# Patient Record
Sex: MALE | Race: WHITE | NOT HISPANIC OR LATINO | Employment: OTHER | URBAN - METROPOLITAN AREA
[De-identification: names, ages, dates, MRNs, and addresses within clinical notes are randomized per-mention and may not be internally consistent; named-entity substitution may affect disease eponyms.]

---

## 2017-03-29 ENCOUNTER — HOSPITAL ENCOUNTER (EMERGENCY)
Facility: HOSPITAL | Age: 57
Discharge: HOME | End: 2017-03-30
Attending: EMERGENCY MEDICINE | Admitting: EMERGENCY MEDICINE
Payer: COMMERCIAL

## 2017-03-29 VITALS
HEART RATE: 73 BPM | BODY MASS INDEX: 37.67 KG/M2 | HEIGHT: 67 IN | TEMPERATURE: 98 F | SYSTOLIC BLOOD PRESSURE: 136 MMHG | DIASTOLIC BLOOD PRESSURE: 76 MMHG | WEIGHT: 240 LBS | RESPIRATION RATE: 20 BRPM | OXYGEN SATURATION: 98 %

## 2017-03-29 DIAGNOSIS — F41.9 ANXIETY: ICD-10-CM

## 2017-03-29 DIAGNOSIS — R00.2 PALPITATIONS: Primary | ICD-10-CM

## 2017-03-29 LAB
BASOPHILS # BLD AUTO: 0 THOUSANDS/ΜL (ref 0–0.1)
BASOPHILS NFR BLD AUTO: 0 % (ref 0–1)
EOSINOPHIL # BLD AUTO: 0.2 THOUSAND/ΜL (ref 0–0.61)
EOSINOPHIL NFR BLD AUTO: 1 % (ref 0–6)
ERYTHROCYTE [DISTWIDTH] IN BLOOD BY AUTOMATED COUNT: 12.6 % (ref 11.6–15.1)
HCT VFR BLD AUTO: 46.9 % (ref 42–52)
HGB BLD-MCNC: 15.7 G/DL (ref 14–18)
LYMPHOCYTES # BLD AUTO: 3.1 THOUSANDS/ΜL (ref 0.6–4.47)
LYMPHOCYTES NFR BLD AUTO: 26 % (ref 14–44)
MCH RBC QN AUTO: 30.1 PG (ref 27–31)
MCHC RBC AUTO-ENTMCNC: 33.5 G/DL (ref 31.4–37.4)
MCV RBC AUTO: 90 FL (ref 82–98)
MONOCYTES # BLD AUTO: 1 THOUSAND/ΜL (ref 0.17–1.22)
MONOCYTES NFR BLD AUTO: 8 % (ref 4–12)
NEUTROPHILS # BLD AUTO: 7.6 THOUSANDS/ΜL (ref 1.85–7.62)
NEUTS SEG NFR BLD AUTO: 64 % (ref 43–75)
NRBC BLD AUTO-RTO: 0 /100 WBCS
PLATELET # BLD AUTO: 254 THOUSANDS/UL (ref 130–400)
PMV BLD AUTO: 9 FL (ref 8.9–12.7)
RBC # BLD AUTO: 5.22 MILLION/UL (ref 4.7–6.1)
WBC # BLD AUTO: 11.8 THOUSAND/UL (ref 4.8–10.8)

## 2017-03-29 PROCEDURE — 93005 ELECTROCARDIOGRAM TRACING: CPT | Performed by: EMERGENCY MEDICINE

## 2017-03-29 PROCEDURE — 80053 COMPREHEN METABOLIC PANEL: CPT | Performed by: EMERGENCY MEDICINE

## 2017-03-29 PROCEDURE — 85379 FIBRIN DEGRADATION QUANT: CPT | Performed by: EMERGENCY MEDICINE

## 2017-03-29 PROCEDURE — 36415 COLL VENOUS BLD VENIPUNCTURE: CPT | Performed by: EMERGENCY MEDICINE

## 2017-03-29 PROCEDURE — 85025 COMPLETE CBC W/AUTO DIFF WBC: CPT | Performed by: EMERGENCY MEDICINE

## 2017-03-29 RX ORDER — ROSUVASTATIN CALCIUM 10 MG/1
10 TABLET, COATED ORAL DAILY
COMMUNITY
End: 2019-09-23 | Stop reason: ALTCHOICE

## 2017-03-30 LAB
ALBUMIN SERPL BCP-MCNC: 3.8 G/DL (ref 3.5–5)
ALP SERPL-CCNC: 54 U/L (ref 46–116)
ALT SERPL W P-5'-P-CCNC: 52 U/L (ref 12–78)
ANION GAP SERPL CALCULATED.3IONS-SCNC: 9 MMOL/L (ref 4–13)
AST SERPL W P-5'-P-CCNC: 24 U/L (ref 5–45)
ATRIAL RATE: 72 BPM
BILIRUB SERPL-MCNC: 0.4 MG/DL (ref 0.2–1)
BUN SERPL-MCNC: 20 MG/DL (ref 5–25)
CALCIUM SERPL-MCNC: 9.2 MG/DL (ref 8.3–10.1)
CHLORIDE SERPL-SCNC: 102 MMOL/L (ref 100–108)
CO2 SERPL-SCNC: 29 MMOL/L (ref 21–32)
CREAT SERPL-MCNC: 1.26 MG/DL (ref 0.6–1.3)
DEPRECATED D DIMER PPP: <190 NG/ML (FEU) (ref 190–520)
GFR SERPL CREATININE-BSD FRML MDRD: 58.8 ML/MIN/1.73SQ M
GLUCOSE SERPL-MCNC: 195 MG/DL (ref 65–140)
P AXIS: 56 DEGREES
POTASSIUM SERPL-SCNC: 3.9 MMOL/L (ref 3.5–5.3)
PR INTERVAL: 166 MS
PROT SERPL-MCNC: 7.3 G/DL (ref 6.4–8.2)
QRS AXIS: 76 DEGREES
QRSD INTERVAL: 98 MS
QT INTERVAL: 400 MS
QTC INTERVAL: 438 MS
SODIUM SERPL-SCNC: 140 MMOL/L (ref 136–145)
T WAVE AXIS: 42 DEGREES
TROPONIN I SERPL-MCNC: <0.02 NG/ML
VENTRICULAR RATE: 72 BPM

## 2017-03-30 PROCEDURE — 84484 ASSAY OF TROPONIN QUANT: CPT | Performed by: EMERGENCY MEDICINE

## 2017-03-30 PROCEDURE — 99285 EMERGENCY DEPT VISIT HI MDM: CPT

## 2018-05-22 ENCOUNTER — OFFICE VISIT (OUTPATIENT)
Dept: GASTROENTEROLOGY | Facility: CLINIC | Age: 58
End: 2018-05-22
Payer: COMMERCIAL

## 2018-05-22 VITALS
DIASTOLIC BLOOD PRESSURE: 78 MMHG | WEIGHT: 238 LBS | TEMPERATURE: 97.2 F | BODY MASS INDEX: 37.35 KG/M2 | SYSTOLIC BLOOD PRESSURE: 120 MMHG | HEART RATE: 55 BPM | HEIGHT: 67 IN

## 2018-05-22 DIAGNOSIS — Z86.010 HISTORY OF COLON POLYPS: Primary | ICD-10-CM

## 2018-05-22 PROBLEM — Z86.0100 HISTORY OF COLON POLYPS: Status: ACTIVE | Noted: 2018-05-22

## 2018-05-22 PROCEDURE — 99244 OFF/OP CNSLTJ NEW/EST MOD 40: CPT | Performed by: INTERNAL MEDICINE

## 2018-05-22 RX ORDER — ROSUVASTATIN CALCIUM 20 MG/1
TABLET, COATED ORAL
COMMUNITY
Start: 2018-03-20 | End: 2022-02-02

## 2018-05-22 RX ORDER — PRASUGREL 10 MG/1
TABLET, FILM COATED ORAL
COMMUNITY
Start: 2018-04-05 | End: 2019-08-23

## 2018-05-22 RX ORDER — METFORMIN HYDROCHLORIDE 500 MG/1
TABLET, EXTENDED RELEASE ORAL
COMMUNITY
Start: 2018-03-30 | End: 2020-08-24

## 2018-05-22 RX ORDER — BLOOD-GLUCOSE METER
KIT MISCELLANEOUS
Refills: 0 | COMMUNITY
Start: 2018-05-10

## 2018-05-22 RX ORDER — FENOFIBRIC ACID 135 MG/1
CAPSULE, DELAYED RELEASE ORAL
COMMUNITY
Start: 2018-04-13 | End: 2019-09-23 | Stop reason: SDUPTHER

## 2018-05-22 RX ORDER — METOPROLOL SUCCINATE 25 MG
TABLET, EXTENDED RELEASE 24 HR ORAL
COMMUNITY
Start: 2018-03-20 | End: 2019-08-23

## 2018-05-22 RX ORDER — ASPIRIN 81 MG/1
81 TABLET, CHEWABLE ORAL
COMMUNITY
Start: 2018-03-20

## 2018-05-22 RX ORDER — LOSARTAN POTASSIUM 25 MG/1
25 TABLET ORAL
COMMUNITY

## 2018-05-22 NOTE — LETTER
May 22, 2018     [address] [place]    Patient: Harish Corbin   YOB: 1960   Date of Visit: 5/22/2018       Dear Dr Deshawn Haro:    Thank you for referring Harish Corbin to me for evaluation  Below are my notes for this consultation  If you have questions, please do not hesitate to call me  I look forward to following your patient along with you           Sincerely,        Avelino Chen MD        CC: No Recipients

## 2018-05-22 NOTE — PROGRESS NOTES
Consultation - [facility]  Miguelina Moralez 1960 male         Chief Complaint:  History of colon polyps    HPI:  49-year-old male with history of coronary artery disease status post stent placement in May 2017 and October 2017 was referred for colonoscopy  Patient has history of colon polyps and his last colonoscopy was about 8 years ago  Patient has regular bowel movements and denies any blood or mucus in the stool  Appetite is good and denies any recent weight loss  Denies any abdominal pain, nausea, or vomiting  Has no heartburn or acid reflux  Denies any difficulty swallowing  REVIEW OF SYSTEMS: Review of Systems   Constitutional: Negative for activity change, appetite change, chills, diaphoresis, fatigue, fever and unexpected weight change  HENT: Negative for ear discharge, ear pain, facial swelling, hearing loss, nosebleeds, sore throat, tinnitus and voice change  Eyes: Negative for pain, discharge, redness, itching and visual disturbance  Respiratory: Negative for apnea, cough, chest tightness, shortness of breath and wheezing  Cardiovascular: Negative for chest pain and palpitations  Gastrointestinal:        As noted in HPI   Endocrine: Negative for cold intolerance, heat intolerance and polyuria  Genitourinary: Negative for difficulty urinating, dysuria, flank pain, hematuria and urgency  Musculoskeletal: Negative for arthralgias, back pain, gait problem, joint swelling and myalgias  Skin: Negative for rash and wound  Neurological: Negative for dizziness, tremors, seizures, speech difficulty, light-headedness, numbness and headaches  Hematological: Negative for adenopathy  Does not bruise/bleed easily  Psychiatric/Behavioral: Negative for agitation, behavioral problems and confusion  The patient is not nervous/anxious           Past Medical History:   Diagnosis Date    Colon polyp     Diabetes mellitus (HCC)     Hyperlipidemia       Past Surgical History:   Procedure Laterality Date    COLONOSCOPY       Social History     Social History    Marital status: Single     Spouse name: N/A    Number of children: N/A    Years of education: N/A     Occupational History    Not on file  Social History Main Topics    Smoking status: Current Every Day Smoker     Types: Cigars    Smokeless tobacco: Never Used      Comment: 3 a day    Alcohol use Yes      Comment: 1-2 beers once a week    Drug use: No    Sexual activity: Not on file     Other Topics Concern    Not on file     Social History Narrative    No narrative on file     Family History   Problem Relation Age of Onset    Colon cancer Maternal Grandmother     Breast cancer Mother     Other Mother      Chocolate  Current Outpatient Prescriptions   Medication Sig Dispense Refill    aspirin 81 mg chewable tablet       Blood Glucose Monitoring Suppl (ONE TOUCH ULTRA MINI) w/Device KIT   0    Chlorphen-Pyril-Phenyleph (TRIPLEX AD PO) Take 135 mg by mouth daily      Choline Fenofibrate (FENOFIBRIC ACID) 135 MG CPDR       choline fenofibrate (TRILIPIX) 45 MG capsule Take by mouth      losartan (COZAAR) 25 mg tablet Take 25 mg by mouth daily      metFORMIN (GLUCOPHAGE-XR) 500 mg 24 hr tablet       ONE TOUCH ULTRA TEST test strip TEST twice a day  0    ONETOUCH DELICA LANCETS FINE MISC TEST twice a day  0    prasugrel (EFFIENT) tablet       rosuvastatin (CRESTOR) 10 MG tablet Take 10 mg by mouth daily      TOPROL XL 25 MG 24 hr tablet       METFORMIN HCL PO Take by mouth daily      Na Sulfate-K Sulfate-Mg Sulf (SUPREP BOWEL PREP KIT) 17 5-3 13-1 6 GM/180ML SOLN Take 2 Bottles by mouth see administration instructions Please follow the instructions from the office 2 Bottle 0    rosuvastatin (CRESTOR) 20 MG tablet        No current facility-administered medications for this visit          Blood pressure 120/78, pulse 55, temperature (!) 97 2 °F (36 2 °C), temperature source Tympanic, height 5' 7" (1 702 m), weight 108 kg (238 lb)  PHYSICAL EXAM: Physical Exam   Constitutional: He is oriented to person, place, and time  He appears well-developed  HENT:   Head: Normocephalic and atraumatic  Mouth/Throat: Oropharynx is clear and moist    Eyes: Conjunctivae are normal  Pupils are equal, round, and reactive to light  Right eye exhibits no discharge  Left eye exhibits no discharge  No scleral icterus  Neck: Neck supple  No JVD present  No tracheal deviation present  No thyromegaly present  Cardiovascular: Normal rate, regular rhythm, normal heart sounds and intact distal pulses  Exam reveals no gallop and no friction rub  No murmur heard  Pulmonary/Chest: Effort normal and breath sounds normal  No respiratory distress  He has no wheezes  He has no rales  He exhibits no tenderness  Abdominal: Soft  Bowel sounds are normal  He exhibits no distension and no mass  There is no tenderness  There is no rebound and no guarding  No hernia  Musculoskeletal: He exhibits no edema  Lymphadenopathy:     He has no cervical adenopathy  Neurological: He is alert and oriented to person, place, and time  Skin: Skin is warm and dry  No rash noted  No erythema  Psychiatric: He has a normal mood and affect  His behavior is normal  Thought content normal         Lab Results   Component Value Date    WBC 11 80 (H) 03/29/2017    HGB 15 7 03/29/2017    HCT 46 9 03/29/2017    MCV 90 03/29/2017     03/29/2017     Lab Results   Component Value Date    GLUCOSE 195 (H) 03/29/2017    CALCIUM 9 2 03/29/2017     03/29/2017    K 3 9 03/29/2017    CO2 29 03/29/2017     03/29/2017    BUN 20 03/29/2017    CREATININE 1 26 03/29/2017     Lab Results   Component Value Date    ALT 52 03/29/2017    AST 24 03/29/2017    ALKPHOS 54 03/29/2017    BILITOT 0 40 03/29/2017     No results found for: INR, PROTIME    No results found      ASSESSMENT & PLAN:    History of colon polyps  Personal history of colon polyps- patient is at increased risk for colon cancer screening  Rule out colorectal lesions including polyps or malignancy     -Schedule for colonoscopy  Patient is at increased risks because of recent stent placement and antiplatelet therapy  Prefer to wait at least for a year since his last stent placement  Patient has an appointment with his cardiologist this week after which he will give was a call about the timing of colonoscopy  Discussed with him about the diagnostic colonoscopy if needed  -High-fiber diet     -Patient was given instructions about the colonoscopy prep     -Patient was explained about  the risks and benefits of the procedure  Risks including but not limited to bleeding, infection, perforation were explained in detail  Also explained about less than 100% sensitivity with the exam and other alternatives

## 2018-05-24 ENCOUNTER — TELEPHONE (OUTPATIENT)
Dept: GASTROENTEROLOGY | Facility: CLINIC | Age: 58
End: 2018-05-24

## 2018-05-24 ENCOUNTER — TELEPHONE (OUTPATIENT)
Dept: GASTROENTEROLOGY | Facility: AMBULARY SURGERY CENTER | Age: 58
End: 2018-05-24

## 2018-05-24 NOTE — TELEPHONE ENCOUNTER
Left patient voicemail to come into office to fill out medical records consent form for his release of records

## 2018-05-24 NOTE — TELEPHONE ENCOUNTER
DR Cherry Davies PT    Pt called requesting for the office to request his records from the [facility] [place] [phone] (p), [phone] (f)

## 2019-04-29 LAB — HBA1C MFR BLD HPLC: 7.3 %

## 2019-08-23 ENCOUNTER — TRANSCRIBE ORDERS (OUTPATIENT)
Dept: ADMINISTRATIVE | Facility: HOSPITAL | Age: 59
End: 2019-08-23

## 2019-08-23 ENCOUNTER — CONSULT (OUTPATIENT)
Dept: PULMONOLOGY | Facility: MEDICAL CENTER | Age: 59
End: 2019-08-23
Payer: COMMERCIAL

## 2019-08-23 ENCOUNTER — HOSPITAL ENCOUNTER (OUTPATIENT)
Dept: RADIOLOGY | Facility: HOSPITAL | Age: 59
Discharge: HOME | End: 2019-08-23
Attending: INTERNAL MEDICINE
Payer: COMMERCIAL

## 2019-08-23 VITALS
SYSTOLIC BLOOD PRESSURE: 126 MMHG | OXYGEN SATURATION: 96 % | TEMPERATURE: 95 F | HEART RATE: 49 BPM | RESPIRATION RATE: 12 BRPM | DIASTOLIC BLOOD PRESSURE: 82 MMHG | HEIGHT: 69 IN | WEIGHT: 249 LBS | BODY MASS INDEX: 36.88 KG/M2

## 2019-08-23 DIAGNOSIS — R06.02 SOBOE (SHORTNESS OF BREATH ON EXERTION): ICD-10-CM

## 2019-08-23 DIAGNOSIS — R06.02 SOBOE (SHORTNESS OF BREATH ON EXERTION): Primary | ICD-10-CM

## 2019-08-23 PROBLEM — J45.20 MILD INTERMITTENT ASTHMA WITHOUT COMPLICATION: Status: ACTIVE | Noted: 2019-08-23

## 2019-08-23 PROBLEM — G47.33 OSA (OBSTRUCTIVE SLEEP APNEA): Status: ACTIVE | Noted: 2019-08-23

## 2019-08-23 PROCEDURE — 99214 OFFICE O/P EST MOD 30 MIN: CPT | Performed by: INTERNAL MEDICINE

## 2019-08-23 PROCEDURE — 71046 X-RAY EXAM CHEST 2 VIEWS: CPT

## 2019-08-23 PROCEDURE — 94010 BREATHING CAPACITY TEST: CPT | Performed by: INTERNAL MEDICINE

## 2019-08-23 RX ORDER — AMOXICILLIN 875 MG/1
TABLET, COATED ORAL EVERY 12 HOURS
Refills: 0 | COMMUNITY
Start: 2019-07-18 | End: 2019-09-23 | Stop reason: ALTCHOICE

## 2019-08-23 NOTE — PROGRESS NOTES
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  I will trial him on 2 weeks of Breo and asess response to treatment  I see no evidence of COPD on office spirometry but it does suggest restrictive lung disease  I have ordered CXR and full PFTs for further evaluation  MOUNIKA (obstructive sleep apnea)  I am highly suspicous for sleep apnea and he is agreeable to sleep study  Diagnoses and all orders for this visit:    SOBOE (shortness of breath on exertion)  -     POCT spirometry  -     Complete pulmonary function test; Future  -     XR chest pa & lateral; Future  -     Diagnostic Sleep Study; Future    Other orders  -     amoxicillin (AMOXIL) 875 mg tablet; Take by mouth every 12 (twelve) hours  -     CIPRODEX otic suspension; instill 4 drops into affected ear twice a day for 7 days          Subjective:      Patient ID: Lindsey Dietrich is a 59 y o  male  Mr Jojo Samaniego is here for evaluation of his shortness of breath  He noticed this over the past 18 months with dyspnea on exertion and with stairs and hot weather  He does have occasional wheeze and coughs mostly with laughing and in AM   He smoked cigars since 1992 but quit 3 weeks ago about 2 a day  He denies frequent bronchitis or pneumonia  He sleeps 7 hours a night and has snoring and significant daytime sleepiness  He worked his life as a  is now retired  Wheezing    Associated symptoms include coughing  Pertinent negatives include no shortness of breath  Shortness of Breath   Associated symptoms include wheezing  Cough   Associated symptoms include wheezing  Pertinent negatives include no shortness of breath         The following portions of the patient's history were reviewed and updated as appropriate: allergies, current medications, past family history, past medical history, past social history, past surgical history and problem list     Review of Systems   Constitutional: Negative  HENT: Negative  Eyes: Negative  Respiratory: Positive for cough and wheezing  Negative for shortness of breath  Cardiovascular: Negative  Gastrointestinal: Negative  Endocrine: Negative  Genitourinary: Negative  Allergic/Immunologic: Negative  Neurological: Negative  Hematological: Negative  Psychiatric/Behavioral: Negative  Objective:      /82 (BP Location: Left arm, Patient Position: Sitting, Cuff Size: Standard)   Pulse (!) 49   Temp (!) 95 °F (35 °C) (Tympanic)   Resp 12   Ht 5' 8 5" (1 74 m)   Wt 113 kg (249 lb)   SpO2 96%   BMI 37 31 kg/m²          Physical Exam   Constitutional: He is oriented to person, place, and time  He appears well-developed and well-nourished  HENT:   Head: Normocephalic  Eyes: Pupils are equal, round, and reactive to light  Neck: Neck supple  Cardiovascular: Normal rate  Pulmonary/Chest: Effort normal  No respiratory distress  He has no wheezes  He has no rales  Abdominal: Soft  Musculoskeletal: Normal range of motion  He exhibits no edema  Neurological: He is alert and oriented to person, place, and time  Skin: Skin is warm and dry

## 2019-08-23 NOTE — ASSESSMENT & PLAN NOTE
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

## 2019-08-27 DIAGNOSIS — R91.1 LUNG NODULE SEEN ON IMAGING STUDY: Primary | ICD-10-CM

## 2019-08-27 NOTE — PROGRESS NOTES
I had left message to patient regarding chest x-ray findings  Done on 08/23/2019 no acute cardiopulmonary disease was noted  There was a nodular density the right lung base possibly representing the right nipple shadow  It was suggested the patient have CT of chest with nipple marker to exclude pulmonary nodule  I ordered CT of chest and also left office phone number patient having questions

## 2019-08-29 ENCOUNTER — TELEPHONE (OUTPATIENT)
Dept: PULMONOLOGY | Facility: MEDICAL CENTER | Age: 59
End: 2019-08-29

## 2019-08-29 NOTE — TELEPHONE ENCOUNTER
Patient called and would like to speak with you regarding two tests he has to have done  He needs a CT scan and complete pulmonary function test and would like to know what it all involves

## 2019-08-30 ENCOUNTER — TELEPHONE (OUTPATIENT)
Dept: PULMONOLOGY | Facility: MEDICAL CENTER | Age: 59
End: 2019-08-30

## 2019-08-30 NOTE — TELEPHONE ENCOUNTER
Calling to speak with you regarding lung x-ray Dr Bud Ny spoke with him about  States the x-ray had a shadow on it and wants to know if it could be from the heart monitor installed under his skin  Please call and advise

## 2019-09-03 NOTE — TELEPHONE ENCOUNTER
Returned call  All questions answered regarding x-ray, CT scan, pulmonary function tests    Pt thankful for follow up

## 2019-09-04 ENCOUNTER — HOSPITAL ENCOUNTER (OUTPATIENT)
Dept: PULMONOLOGY | Facility: HOSPITAL | Age: 59
Discharge: HOME | End: 2019-09-04
Attending: INTERNAL MEDICINE
Payer: COMMERCIAL

## 2019-09-04 DIAGNOSIS — R06.02 SOBOE (SHORTNESS OF BREATH ON EXERTION): ICD-10-CM

## 2019-09-04 PROCEDURE — 94726 PLETHYSMOGRAPHY LUNG VOLUMES: CPT | Performed by: INTERNAL MEDICINE

## 2019-09-04 PROCEDURE — 94760 N-INVAS EAR/PLS OXIMETRY 1: CPT

## 2019-09-04 PROCEDURE — 94060 EVALUATION OF WHEEZING: CPT | Performed by: INTERNAL MEDICINE

## 2019-09-04 PROCEDURE — 94729 DIFFUSING CAPACITY: CPT | Performed by: INTERNAL MEDICINE

## 2019-09-04 PROCEDURE — 94726 PLETHYSMOGRAPHY LUNG VOLUMES: CPT

## 2019-09-04 PROCEDURE — 94729 DIFFUSING CAPACITY: CPT

## 2019-09-04 PROCEDURE — 94060 EVALUATION OF WHEEZING: CPT

## 2019-09-04 RX ORDER — ALBUTEROL SULFATE 2.5 MG/3ML
2.5 SOLUTION RESPIRATORY (INHALATION) ONCE
Status: DISCONTINUED | OUTPATIENT
Start: 2019-09-04 | End: 2019-09-08 | Stop reason: HOSPADM

## 2019-09-09 ENCOUNTER — TELEPHONE (OUTPATIENT)
Dept: PULMONOLOGY | Facility: MEDICAL CENTER | Age: 59
End: 2019-09-09

## 2019-09-10 NOTE — PROGRESS NOTES
LEIDA has throat soreness  He has been using Breo 100 mcg 1 puff daily  He has less wheezing and feels somewhat better  I told him that he can use Breo inhaler every other day or stop completely  I reviewed PFTs briefly  Does not appear that he has any obstructive defect  Diffusion capacity is normal   Patient has not yet gotten his CT of chest   He has a follow-up with Dr Erick Herman on September 19, 2019

## 2019-09-10 NOTE — TELEPHONE ENCOUNTER
Patient called back and would like to know if soreness of his throat could be a side effect of the inhaler Dr Huston Halsted put him on at his visit here  Please call patient to advise

## 2019-09-13 ENCOUNTER — TELEPHONE (OUTPATIENT)
Dept: SLEEP CENTER | Facility: CLINIC | Age: 59
End: 2019-09-13

## 2019-09-13 NOTE — TELEPHONE ENCOUNTER
----- Message from Marcellus Rosado MD sent at 9/12/2019  3:01 PM EDT -----  Approved  ----- Message -----  From: Sulaiman Campbell MA  Sent: 9/12/2019   1:34 PM EDT  To: Sleep Medicine [facility] Provider    This sleep study needs approval      If approved please sign and return to clerical pool  If denied please include reasons why  Also provide alternative testing if warranted  Please sign and return to clerical pool

## 2019-09-20 ENCOUNTER — HOSPITAL ENCOUNTER (OUTPATIENT)
Dept: RADIOLOGY | Facility: HOSPITAL | Age: 59
Discharge: HOME | End: 2019-09-20
Payer: COMMERCIAL

## 2019-09-20 DIAGNOSIS — R91.1 LUNG NODULE SEEN ON IMAGING STUDY: ICD-10-CM

## 2019-09-20 PROCEDURE — 71250 CT THORAX DX C-: CPT

## 2019-09-23 ENCOUNTER — OFFICE VISIT (OUTPATIENT)
Dept: PULMONOLOGY | Facility: CLINIC | Age: 59
End: 2019-09-23
Payer: COMMERCIAL

## 2019-09-23 VITALS
OXYGEN SATURATION: 97 % | WEIGHT: 245 LBS | TEMPERATURE: 97.8 F | BODY MASS INDEX: 36.29 KG/M2 | HEIGHT: 69 IN | HEART RATE: 54 BPM | SYSTOLIC BLOOD PRESSURE: 138 MMHG | RESPIRATION RATE: 16 BRPM | DIASTOLIC BLOOD PRESSURE: 74 MMHG

## 2019-09-23 DIAGNOSIS — J45.40 MODERATE PERSISTENT ASTHMA, UNSPECIFIED WHETHER COMPLICATED: Primary | ICD-10-CM

## 2019-09-23 DIAGNOSIS — G47.33 OSA (OBSTRUCTIVE SLEEP APNEA): ICD-10-CM

## 2019-09-23 DIAGNOSIS — J45.20 MILD INTERMITTENT ASTHMA WITHOUT COMPLICATION: ICD-10-CM

## 2019-09-23 PROCEDURE — 90656 IIV3 VACC NO PRSV 0.5 ML IM: CPT | Performed by: INTERNAL MEDICINE

## 2019-09-23 PROCEDURE — 90471 IMMUNIZATION ADMIN: CPT | Performed by: INTERNAL MEDICINE

## 2019-09-23 PROCEDURE — 99214 OFFICE O/P EST MOD 30 MIN: CPT | Performed by: INTERNAL MEDICINE

## 2019-09-23 RX ORDER — BUDESONIDE AND FORMOTEROL FUMARATE DIHYDRATE 160; 4.5 UG/1; UG/1
2 AEROSOL RESPIRATORY (INHALATION) 2 TIMES DAILY
Qty: 1 INHALER | Refills: 3 | Status: SHIPPED | OUTPATIENT
Start: 2019-09-23 | End: 2021-01-20

## 2019-09-23 NOTE — ASSESSMENT & PLAN NOTE
Mr Abebe Saha is scheduled for a sleep test coming up on October 3rd    Will follow up with him after the results of this test

## 2019-09-23 NOTE — PROGRESS NOTES
Assessment/Plan:    MOUNIKA (obstructive sleep apnea)  Mr Anatoly Gomez is scheduled for a sleep test coming up on October 3rd  Will follow up with him after the results of this test     Mild intermittent asthma without complication  I reviewed Mr Vicente's chest x-ray and recent CT scan which both appeared normal   I reviewed his most recent pulmonary function tests which were both normal   I do think he has some mild asthma related to his prolonged cigarette and occupational exposure  He seemed to respond well on Breo was having hoarseness which limited its use  I have replaced the Breo with Symbicort 2 puffs twice a day in an attempt to reduce the side effect of hoarseness that comes with the dry powder inhalers  Will follow up with him in 3-4 months  I did give him his flu shot today  Diagnoses and all orders for this visit:    Moderate persistent asthma, unspecified whether complicated  -     budesonide-formoterol (SYMBICORT) 160-4 5 mcg/act inhaler; Inhale 2 puffs 2 (two) times a day Rinse mouth after use  -     Flu vaccine greater than or equal to 4yo preservative free IM    Mild intermittent asthma without complication    MOUNIKA (obstructive sleep apnea)    Other orders  -     choline fenofibrate (TRILIPIX) 135 MG capsule; Take 135 mg by mouth daily          Subjective:      Patient ID: Candido Kaba is a 59 y o  male  [place] Lina Stewart [place] [facility]  He was no longer short of breath and denied any wheezing  Unfortunately was having hoarseness and therefore stopped taking it about 2 weeks ago  The following portions of the patient's history were reviewed and updated as appropriate: allergies, current medications, past family history, past medical history, past social history, past surgical history and problem list     Review of Systems   Constitutional: Negative  HENT: Negative  Eyes: Negative  Respiratory: Negative for shortness of breath  Cardiovascular: Negative  Gastrointestinal: Negative  Endocrine: Negative  Genitourinary: Negative  Allergic/Immunologic: Negative  Neurological: Negative  Hematological: Negative  Psychiatric/Behavioral: Negative  Objective:      /74 (BP Location: Left arm, Patient Position: Sitting, Cuff Size: Adult)   Pulse (!) 54   Temp 97 8 °F (36 6 °C) (Tympanic)   Resp 16   Ht 5' 8 5" (1 74 m)   Wt 111 kg (245 lb)   SpO2 97%   BMI 36 71 kg/m²          Physical Exam   Constitutional: He is oriented to person, place, and time  He appears well-developed and well-nourished  HENT:   Head: Normocephalic  Eyes: Pupils are equal, round, and reactive to light  Neck: Neck supple  Cardiovascular: Normal rate  Pulmonary/Chest: Effort normal  No respiratory distress  He has no wheezes  He has no rales  Abdominal: Soft  Musculoskeletal: Normal range of motion  He exhibits no edema  Neurological: He is alert and oriented to person, place, and time  Skin: Skin is warm and dry

## 2019-09-23 NOTE — ASSESSMENT & PLAN NOTE
I reviewed Mr Alexandrea Castellanos chest x-ray and recent CT scan which both appeared normal   I reviewed his most recent pulmonary function tests which were both normal   I do think he has some mild asthma related to his prolonged cigarette and occupational exposure  He seemed to respond well on Breo was having hoarseness which limited its use  I have replaced the Breo with Symbicort 2 puffs twice a day in an attempt to reduce the side effect of hoarseness that comes with the dry powder inhalers  Will follow up with him in 3-4 months  I did give him his flu shot today

## 2019-10-03 ENCOUNTER — HOSPITAL ENCOUNTER (OUTPATIENT)
Dept: SLEEP CENTER | Facility: CLINIC | Age: 59
Discharge: HOME | End: 2019-10-03
Payer: COMMERCIAL

## 2019-10-03 DIAGNOSIS — R06.02 SOBOE (SHORTNESS OF BREATH ON EXERTION): ICD-10-CM

## 2019-10-03 PROCEDURE — 95810 POLYSOM 6/> YRS 4/> PARAM: CPT

## 2019-10-04 NOTE — PROGRESS NOTES
Sleep Study Documentation    Pre-Sleep Study       Sleep testing procedure explained to patient:YES    Patient napped prior to study:YES- more than 30 minutes  Napped after 2PM: yes    Caffeine:Dayshift worker after 12PM   Caffeine use:NO    Alcohol:Dayshift workers after 5PM: Alcohol use:NO    Typical day for patient:YES       Study Documentation    Sleep Study Indications: Snoring, Nocturnal Choking, EDS, Obesity    Sleep Study: Diagnostic   Snore:Severe  Supplemental O2: no    O2 flow rate (L/min) range NA  O2 flow rate (L/min) final NA  Minimum SaO2 89  Baseline SaO2 94        Mode of Therapy: NA    EKG abnormalities: no     EEG abnormalities: no    Sleep Study Recorded < 2 hours: N/A    Sleep Study Recorded > 2 hours but incomplete study: N/A    Sleep Study Recorded 6 hours but no sleep obtained: NO    Patient classification: employed       Post-Sleep Study    Medication used at bedtime or during sleep study:YES other prescription medications    Patient reports time it took to fall asleep:30 to 60 minutes    Patient reports waking up during study:3 or more times  Patient reports returning to sleep in 10 to 30 minutes  Patient reports sleeping 2 to 4 hours with dreaming  Patient reports sleep during study:worse than usual    Patient rated sleepiness: Somewhat sleepy or tired    PAP treatment:no

## 2019-10-22 ENCOUNTER — TELEPHONE (OUTPATIENT)
Dept: SLEEP CENTER | Facility: CLINIC | Age: 59
End: 2019-10-22

## 2019-10-24 ENCOUNTER — TELEPHONE (OUTPATIENT)
Dept: PULMONOLOGY | Facility: CLINIC | Age: 59
End: 2019-10-24

## 2019-10-24 DIAGNOSIS — G47.33 OSA (OBSTRUCTIVE SLEEP APNEA): Primary | ICD-10-CM

## 2019-10-24 NOTE — TELEPHONE ENCOUNTER
I called patient back and went over the results of his sleep study with him  I informed him that he has moderate MOUNIKA with an AHI of 22, and that the recommendation is to move forward with a CPAP titration study  Patient is agreeable to this  He did mention that he has surgery in the next few weeks but after his surgery he will have the titration study done  I have provided patient with the number to central scheduling and I have stressed to patient that he should have the study done before his follow up with Dr Toya Stern in December  He will call me if he has any further questions or concerns

## 2019-11-13 ENCOUNTER — HOSPITAL ENCOUNTER (OUTPATIENT)
Dept: SLEEP CENTER | Facility: CLINIC | Age: 59
Discharge: HOME | End: 2019-11-13
Payer: COMMERCIAL

## 2019-11-13 DIAGNOSIS — G47.33 OSA (OBSTRUCTIVE SLEEP APNEA): ICD-10-CM

## 2019-11-13 PROCEDURE — 95811 POLYSOM 6/>YRS CPAP 4/> PARM: CPT | Performed by: INTERNAL MEDICINE

## 2019-11-13 PROCEDURE — 95811 POLYSOM 6/>YRS CPAP 4/> PARM: CPT

## 2019-11-14 NOTE — PROGRESS NOTES
Sleep Study Documentation    Pre-Sleep Study       Sleep testing procedure explained to patient:YES    Patient napped prior to study:YES- less than 30 minutes  Napped after 2PM: yes    Caffeine:Dayshift worker after 12PM   Caffeine use:NO    Alcohol:Dayshift workers after 5PM: Alcohol use:NO    Typical day for patient:NO       Study Documentation    Sleep Study Indications:     Sleep Study: Treatment   Optimal PAP pressure: 12cm  Leak:Large  Snore:Eliminated  REM Obtained:yes  Supplemental O2: no    Minimum SaO2 88%  Baseline SaO2 95 3%  PAP mask tried (list all)Resmed Quattro, Resmed F20, Respironics Wisp  PAP mask choice (final)Resmed Quattro Fx  PAP mask type:full face  PAP pressure at which snoring was eliminated 8cm  Minimum SaO2 at final PAP pressure 92%  Mode of Therapy:CPAP  ETCO2:No  CPAP changed to BiPAP:No    Mode of Therapy:CPAP    EKG abnormalities: yes:  EPOCH example and comments: Bradycardia  Heart rate less than 60bpm throughout the study  EEG abnormalities: no    Sleep Study Recorded < 2 hours: N/A    Sleep Study Recorded > 2 hours but incomplete study: N/A    Sleep Study Recorded 6 hours but no sleep obtained: NO    Patient classification: retired       Post-Sleep Study    Medication used at bedtime or during sleep study:YES other prescription medications    Patient reports time it took to fall asleep:20 to 30 minutes    Patient reports waking up during study:3 or more times  Patient reports returning to sleep in 10 to 30 minutes  Patient reports sleeping 2 to 4 hours with dreaming  Patient reports sleep during study:worse than usual    Patient rated sleepiness: Not sleepy or tired    PAP treatment:yes: Post PAP treatment patient reports feeling worse and  would wear PAP mask at home

## 2019-11-23 DIAGNOSIS — G47.33 OSA (OBSTRUCTIVE SLEEP APNEA): Primary | ICD-10-CM

## 2019-11-26 ENCOUNTER — TELEPHONE (OUTPATIENT)
Dept: PULMONOLOGY | Facility: CLINIC | Age: 59
End: 2019-11-26

## 2019-11-26 NOTE — TELEPHONE ENCOUNTER
I called patient to go over the results of his sleep study with him but he said he would rather talk to Dr Nic Bro as he had in depth questions  Patient did mention that he has an appointment on 12/16/19 with Dr Nic Bro and will wait to discuss the results at that time

## 2019-12-16 ENCOUNTER — OFFICE VISIT (OUTPATIENT)
Dept: PULMONOLOGY | Facility: CLINIC | Age: 59
End: 2019-12-16
Payer: COMMERCIAL

## 2019-12-16 ENCOUNTER — DOCUMENTATION (OUTPATIENT)
Dept: PULMONOLOGY | Facility: CLINIC | Age: 59
End: 2019-12-16

## 2019-12-16 VITALS
BODY MASS INDEX: 36.88 KG/M2 | TEMPERATURE: 97.1 F | HEIGHT: 69 IN | WEIGHT: 249 LBS | RESPIRATION RATE: 18 BRPM | DIASTOLIC BLOOD PRESSURE: 68 MMHG | HEART RATE: 51 BPM | SYSTOLIC BLOOD PRESSURE: 128 MMHG | OXYGEN SATURATION: 97 %

## 2019-12-16 DIAGNOSIS — G47.33 OSA (OBSTRUCTIVE SLEEP APNEA): Primary | ICD-10-CM

## 2019-12-16 DIAGNOSIS — J45.20 MILD INTERMITTENT ASTHMA WITHOUT COMPLICATION: ICD-10-CM

## 2019-12-16 PROCEDURE — 99214 OFFICE O/P EST MOD 30 MIN: CPT | Performed by: INTERNAL MEDICINE

## 2019-12-16 NOTE — ASSESSMENT & PLAN NOTE
[facility] is doing better on the Symbicort and denies any issues with wheezing or shortness of Breath  I will maintain on Symbicort 2 puffs once a day as this seems to be controlling his symptoms  He no longer has hoarseness and is free of thrush  He is up-to-date on his flu and pneumonia vaccines

## 2019-12-16 NOTE — ASSESSMENT & PLAN NOTE
Pulmonary spent 20 minutes today talking about the diagnosis of sleep apnea treatment options in his particular disease  I clarified his apnea-hypopnea index and his treatment options  He is willing to move 4 with an auto CPAP 12-20 and once the nasal pillows with a chin strap which I have ordered and I also scheduled him for a mask fitting in our other office  Will follow up with him in 3 months

## 2019-12-16 NOTE — PROGRESS NOTES
Assessment/Plan:    MOUNIKA (obstructive sleep apnea)  Pulmonary spent 20 minutes today talking about the diagnosis of sleep apnea treatment options in his particular disease  I clarified his apnea-hypopnea index and his treatment options  He is willing to move 4 with an auto CPAP 12-20 and once the nasal pillows with a chin strap which I have ordered and I also scheduled him for a mask fitting in our other office  Will follow up with him in 3 months  Mild intermittent asthma without complication  Kiersten Pan is doing better on the Symbicort and denies any issues with wheezing or shortness of Breath  I will maintain on Symbicort 2 puffs once a day as this seems to be controlling his symptoms  He no longer has hoarseness and is free of thrush  He is up-to-date on his flu and pneumonia vaccines  Diagnoses and all orders for this visit:    MOUNIKA (obstructive sleep apnea)    Mild intermittent asthma without complication          Subjective:      Patient ID: Amauri Mauricio is a 62 y o  male  Kiersten Pan is here for follow-up of his asthma and sleep apnea  With regards to his asthma he is doing well with Symbicort, resolution of his wheezing and no further hoarseness  He did have his sleep studies but did not receive machine  The following portions of the patient's history were reviewed and updated as appropriate: allergies, current medications, past family history, past medical history, past social history, past surgical history and problem list     Review of Systems   Constitutional: Negative  HENT: Negative  Eyes: Negative  Respiratory: Negative for shortness of breath  Cardiovascular: Negative  Gastrointestinal: Negative  Endocrine: Negative  Genitourinary: Negative  Allergic/Immunologic: Negative  Neurological: Negative  Hematological: Negative  Psychiatric/Behavioral: Negative            Objective:      /68 (BP Location: Left arm, Patient Position: Sitting, Cuff Size: Adult) Pulse (!) 51   Temp (!) 97 1 °F (36 2 °C) (Tympanic)   Resp 18   Ht 5' 8 5" (1 74 m)   Wt 113 kg (249 lb)   SpO2 97%   BMI 37 31 kg/m²          Physical Exam   Constitutional: He is oriented to person, place, and time  He appears well-developed and well-nourished  HENT:   Head: Normocephalic  Eyes: Pupils are equal, round, and reactive to light  Neck: Neck supple  Cardiovascular: Normal rate  Pulmonary/Chest: Effort normal  No respiratory distress  He has no wheezes  He has no rales  Abdominal: Soft  Musculoskeletal: Normal range of motion  He exhibits no edema  Neurological: He is alert and oriented to person, place, and time  Skin: Skin is warm and dry

## 2019-12-16 NOTE — PROGRESS NOTES
Script for CPAP, office notes, sleep studies, insurance cards, and pt demographic sheet have been given to the [employer] rep here in the office on 12/16/19  Magen Ambriz will call pt to get him scheduled for CPAP set up

## 2020-07-19 ENCOUNTER — NURSE TRIAGE (OUTPATIENT)
Dept: OTHER | Facility: OTHER | Age: 60
End: 2020-07-19

## 2020-07-19 DIAGNOSIS — Z20.828 SARS-ASSOCIATED CORONAVIRUS EXPOSURE: Primary | ICD-10-CM

## 2020-07-19 NOTE — TELEPHONE ENCOUNTER
Regarding: Coronavirus  ----- Message from Vernon Martinez sent at 7/19/2020  1:45 PM EDT -----  "I have a sore throat and a loss of taste  Before I can go see my PCP, I need to be tested for COVID  They gave me this number  "

## 2020-07-19 NOTE — TELEPHONE ENCOUNTER
Additional Information   [1] Dry cough occurs AND [2] onset > 14 days after COVID-19 EXPOSURE AND [3] no community spread where patient lives   Negative: Severe difficulty breathing (e g , struggling for each breath, speaks in single words)   Negative: Bluish (or gray) lips or face now   Negative: [1] Rapid onset of cough AND [2] has hives   Negative: Coughing started suddenly after medicine, an allergic food or bee sting   Negative: [1] Difficulty breathing AND [2] exposure to flames, smoke, or fumes   Negative: [1] Stridor AND [2] difficulty breathing   Negative: Sounds like a life-threatening emergency to the triager    Answer Assessment - Initial Assessment Questions  1  CLOSE CONTACT: "Who is the person with the confirmed or suspected COVID-19 infection that you were exposed to?"      Gave a ride to a young lady whos car broke down  2  PLACE of CONTACT: "Where were you when you were exposed to COVID-19?" (e g , home, school, medical waiting room; which city?)        3  TYPE of CONTACT: "How much contact was there?" (e g , sitting next to, live in same house, work in same office, same building)      *No Answer*  4  DURATION of CONTACT: "How long were you in contact with the COVID-19 patient?" (e g , a few seconds, passed by person, a few minutes, live with the patient)      *No Answer*  5  DATE of CONTACT: "When did you have contact with a COVID-19 patient?" (e g , how many days ago)      7/15/2020  6  TRAVEL: "Have you traveled out of the country recently?" If so, "When and where? "NO      * Also ask about out-of-state travel, since the CDC has identified some high-risk cities for community spread in the [address]  * Note: Travel becomes less relevant if there is widespread community transmission where the patient lives        *No Answer*  7  COMMUNITY SPREAD: "Are there lots of cases of COVID-19 (community spread) where you live?" (See public health department website, if unsure)        *No Answer*  8  SYMPTOMS: "Do you have any symptoms?" (e g , fever, cough, breathing difficulty)      SYMPTOMS STARTED 7/18/2020  9  PREGNANCY OR POSTPARTUM: "Is there any chance you are pregnant?" "When was your last menstrual period?" "Did you deliver in the last 2 weeks?"      NA  10  HIGH RISK: "Do you have any heart or lung problems?  Do you have a weak immune system?" (e g , CHF, COPD, asthma, HIV positive, chemotherapy, renal failure, diabetes mellitus, sickle cell anemia)        SLEEP APNEA    Protocols used: CORONAVIRUS (COVID-19) EXPOSURE-ADULT-AH, COUGH - ACUTE NON-PRODUCTIVE-ADULT-AH

## 2020-07-20 DIAGNOSIS — Z20.828 SARS-ASSOCIATED CORONAVIRUS EXPOSURE: ICD-10-CM

## 2020-07-20 PROCEDURE — U0003 INFECTIOUS AGENT DETECTION BY NUCLEIC ACID (DNA OR RNA); SEVERE ACUTE RESPIRATORY SYNDROME CORONAVIRUS 2 (SARS-COV-2) (CORONAVIRUS DISEASE [COVID-19]), AMPLIFIED PROBE TECHNIQUE, MAKING USE OF HIGH THROUGHPUT TECHNOLOGIES AS DESCRIBED BY CMS-2020-01-R: HCPCS | Performed by: OBSTETRICS & GYNECOLOGY

## 2020-07-21 ENCOUNTER — HOSPITAL ENCOUNTER (EMERGENCY)
Facility: HOSPITAL | Age: 60
Discharge: HOME | End: 2020-07-21
Attending: EMERGENCY MEDICINE | Admitting: EMERGENCY MEDICINE
Payer: COMMERCIAL

## 2020-07-21 VITALS
WEIGHT: 241.2 LBS | SYSTOLIC BLOOD PRESSURE: 139 MMHG | TEMPERATURE: 96.4 F | OXYGEN SATURATION: 95 % | DIASTOLIC BLOOD PRESSURE: 71 MMHG | BODY MASS INDEX: 36.14 KG/M2 | HEART RATE: 54 BPM | RESPIRATION RATE: 20 BRPM

## 2020-07-21 DIAGNOSIS — H10.30 CONJUNCTIVITIS, ACUTE: Primary | ICD-10-CM

## 2020-07-21 DIAGNOSIS — H57.89 IRRITATION OF LEFT EYE: ICD-10-CM

## 2020-07-21 PROCEDURE — 99282 EMERGENCY DEPT VISIT SF MDM: CPT

## 2020-07-21 PROCEDURE — 99284 EMERGENCY DEPT VISIT MOD MDM: CPT | Performed by: EMERGENCY MEDICINE

## 2020-07-21 RX ORDER — ERYTHROMYCIN 5 MG/G
OINTMENT OPHTHALMIC
Qty: 3.5 G | Refills: 0 | Status: ON HOLD | OUTPATIENT
Start: 2020-07-21 | End: 2020-08-31 | Stop reason: ALTCHOICE

## 2020-07-21 RX ORDER — ERYTHROMYCIN 5 MG/G
0.5 OINTMENT OPHTHALMIC ONCE
Status: COMPLETED | OUTPATIENT
Start: 2020-07-21 | End: 2020-07-21

## 2020-07-21 RX ORDER — TETRACAINE HYDROCHLORIDE 5 MG/ML
2 SOLUTION OPHTHALMIC ONCE
Status: COMPLETED | OUTPATIENT
Start: 2020-07-21 | End: 2020-07-21

## 2020-07-21 RX ADMIN — TETRACAINE HYDROCHLORIDE 2 DROP: 5 SOLUTION OPHTHALMIC at 15:29

## 2020-07-21 RX ADMIN — ERYTHROMYCIN 0.5 INCH: 5 OINTMENT OPHTHALMIC at 16:22

## 2020-07-21 RX ADMIN — FLUORESCEIN SODIUM 1 STRIP: 1 STRIP OPHTHALMIC at 15:44

## 2020-07-21 NOTE — ED PROVIDER NOTES
History  Chief Complaint   Patient presents with    Eye Redness     States he was putting a chlorine tablet in a floater and the water splashed in his left eye 8 pm yesterday   Patient is also waiting for Covid-19 result      Patient is a 62year old male with a past medical history significant for CAD s/p stents, HTN, DM who presents with left eye redness, stinging, and irritation since last evening at 7pm when he was putting chlorine tablet into a pool floater and it splashed in his left eye  Since then he has been having blurry/ foggy vision  He says that he has baseline blurry and foggy vision secondary to cataracts, but that it seems worse since the splash  Patient states that he has been flushing his eye with water  He also purchased artifical tears and eye irrigation solution which he states that he has been using, but still feels irritated  Denies headache, facial swelling, fevers  Wears eyeglasses  No contact lenses  Prior to Admission Medications   Prescriptions Last Dose Informant Patient Reported? Taking? Blood Glucose Monitoring Suppl (ONE TOUCH ULTRA MINI) w/Device KIT   Yes No   ONE TOUCH ULTRA TEST test strip   Yes No   Sig: TEST twice a day   ONETOUCH DELICA LANCETS FINE MISC   Yes No   Sig: TEST twice a day   aspirin 81 mg chewable tablet   Yes No   budesonide-formoterol (SYMBICORT) 160-4 5 mcg/act inhaler   No No   Sig: Inhale 2 puffs 2 (two) times a day Rinse mouth after use     cetirizine (ZyrTEC) 10 mg tablet   Yes No   Sig: Take 10 mg by mouth daily   choline fenofibrate (TRILIPIX) 135 MG capsule  Self Yes No   Sig: Take 135 mg by mouth daily   losartan (COZAAR) 25 mg tablet   Yes No   Sig: Take 25 mg by mouth daily   metFORMIN (GLUCOPHAGE-XR) 500 mg 24 hr tablet   Yes No   rosuvastatin (CRESTOR) 20 MG tablet   Yes No   sotalol (BETAPACE) 80 mg tablet   Yes No      Facility-Administered Medications: None       Past Medical History:   Diagnosis Date    Colon polyp     Diabetes mellitus ([facility] [address] )     Hyperlipidemia        Past Surgical History:   Procedure Laterality Date    COLONOSCOPY      CORONARY STENT PLACEMENT N/A mar and oct 2017       Family History   Problem Relation Age of Onset    Colon cancer Maternal Grandmother     Breast cancer Mother     Other Mother     Heart Valve Disease Father     Leukemia Father     Migraines Sister     Heart failure Family      I have reviewed and agree with the history as documented  E-Cigarette/Vaping    E-Cigarette Use Never User      E-Cigarette/Vaping Substances     Social History     Tobacco Use    Smoking status: Former Smoker     Types: Cigars     Start date: 1992     Last attempt to quit: 2019     Years since quittin 9    Smokeless tobacco: Never Used    Tobacco comment: 3 a day  was 6 a day for 6 months only   Substance Use Topics    Alcohol use: Yes     Comment: 1-2 beers once a week    Drug use: No       Review of Systems   Constitutional: Negative for chills and fever  Eyes: Positive for pain, redness and itching  Negative for photophobia, discharge and visual disturbance  Cardiovascular: Negative for chest pain and palpitations  Neurological: Negative for headaches  Physical Exam  Physical Exam   Constitutional: He is oriented to person, place, and time  He appears well-developed and well-nourished  No distress  HENT:   Head: Normocephalic and atraumatic  Right Ear: External ear normal    Left Ear: External ear normal    Nose: Nose normal    Mouth/Throat: Oropharynx is clear and moist  No oropharyngeal exudate  Eyes: Pupils are equal, round, and reactive to light  EOM and lids are normal  Left conjunctiva is injected  Left conjunctiva has no hemorrhage  Left eye exhibits normal extraocular motion and no nystagmus  Slit lamp exam:       The left eye shows no foreign body and no fluorescein uptake  Neck: Normal range of motion  Neck supple     Cardiovascular: Normal rate, regular rhythm, normal heart sounds and intact distal pulses  Exam reveals no gallop and no friction rub  No murmur heard  Pulmonary/Chest: Effort normal and breath sounds normal  No stridor  No respiratory distress  He has no wheezes  He has no rales  He exhibits no tenderness  Abdominal: Soft  Bowel sounds are normal  He exhibits no distension  There is no tenderness  Musculoskeletal: Normal range of motion  He exhibits no edema or tenderness  Neurological: He is alert and oriented to person, place, and time  Skin: Skin is warm and dry  He is not diaphoretic  Psychiatric: He has a normal mood and affect  His behavior is normal    Nursing note and vitals reviewed  Vital Signs  ED Triage Vitals   Temperature Pulse Respirations Blood Pressure SpO2   07/21/20 1509 07/21/20 1509 07/21/20 1509 07/21/20 1509 07/21/20 1511   (!) 96 4 °F (35 8 °C) (!) 54 20 139/71 95 %      Temp Source Heart Rate Source Patient Position - Orthostatic VS BP Location FiO2 (%)   07/21/20 1509 07/21/20 1509 07/21/20 1509 07/21/20 1509 --   Tympanic Monitor Sitting Left arm       Pain Score       07/21/20 1511       2           Vitals:    07/21/20 1509   BP: 139/71   Pulse: (!) 54   Patient Position - Orthostatic VS: Sitting         Visual Acuity  Visual Acuity      Most Recent Value   Visual acuity R eye is  20/70   Visual acuity Left eye is  Other [blurry]   Visual acuity in both eyes is  20/70   Wearing corrective eyewear/lenses?   Yes [glasses]   Glass eye on   Left          ED Medications  Medications   fluorescein sodium sterile ophthalmic strip 1 strip (1 strip Both Eyes Given 7/21/20 1544)   tetracaine 0 5 % ophthalmic solution 2 drop (2 drops Both Eyes Given 7/21/20 1529)   erythromycin (ILOTYCIN) 0 5 % ophthalmic ointment 0 5 inch (0 5 inches Left Eye Given 7/21/20 1622)       Diagnostic Studies  Results Reviewed     None                 No orders to display              Procedures  Procedures         ED Course  ED Course as of Jul [address] [facility] Jul 21, 2020   1624 Discussed with the patient that likely blurry vision is worse secondary to irritation from the chlorine  Currently pH in the eye is 7 (wnl)  No longer needing to be flushed  No fluresecin uptake to suggest corneal abrasion  Will treat with erythromycin ointment  Discussed that since this was 20 hours ago that unfortunately whatever irritation and damage occurred is completed  Counseled that he needs to follow up with optho ASAP  Gave information for Dr Brenda Roe office  MDM  Number of Diagnoses or Management Options  Irritation of left eye:   Left Conjunctivitis, acute:   Diagnosis management comments: Assessment and Plan:   Left eye injected s/p chlorine splash yesterday 7/20/2020 at 7pm  Will check pH, if abnormal, will irrigate  He actually has a optho appointment scheduled for cataract surgery, but had to get a covid test in order to go in, and decided that he wouldn't call for an emergency appointment since he figured they wouldn't take him until testing resulted  Disposition  Final diagnoses:   Left Conjunctivitis, acute   Irritation of left eye     Time reflects when diagnosis was documented in both MDM as applicable and the Disposition within this note     Time User Action Codes Description Comment    7/21/2020  4:02 PM Yun Bleak Add [H10 30] Conjunctivitis, acute     7/21/2020  4:03 PM Yun Bleak Modify [H10 30] Left Conjunctivitis, acute     7/21/2020  4:04 PM Yun Bleak Add [H57 89] Irritation of left eye       ED Disposition     ED Disposition Condition Date/Time Comment    Discharge Stable Tu Jul 21, 2020  4:02 PM Severo Poe discharge to home/self care              Follow-up Information     Follow up With Specialties Details Why Contact Info Additional [address] Emergency Department Emergency Medicine Go to  As needed, If symptoms worsen, for re-evaluation [phone]  [id number]  [phone] [facility], [place], [place], [address]    Peggy Jenkins MD Ophthalmology Schedule an appointment as soon as possible for a visit in 1 day for re-evaluation [facility]  [phone]             Discharge Medication List as of 7/21/2020  4:08 PM      START taking these medications    Details   erythromycin (ILOTYCIN) ophthalmic ointment Place a 1/2 inch ribbon of ointment into the lower left eyelid x 5 days  , Normal         CONTINUE these medications which have NOT CHANGED    Details   aspirin 81 mg chewable tablet Starting Tue 3/20/2018, Historical Med      Blood Glucose Monitoring Suppl (ONE TOUCH ULTRA MINI) w/Device KIT Starting Thu 5/10/2018, Historical Med      budesonide-formoterol (SYMBICORT) 160-4 5 mcg/act inhaler Inhale 2 puffs 2 (two) times a day Rinse mouth after use , Starting Mon 9/23/2019, Normal      cetirizine (ZyrTEC) 10 mg tablet Take 10 mg by mouth daily, Historical Med      choline fenofibrate (TRILIPIX) 135 MG capsule Take 135 mg by mouth daily, Historical Med      losartan (COZAAR) 25 mg tablet Take 25 mg by mouth daily, Historical Med      metFORMIN (GLUCOPHAGE-XR) 500 mg 24 hr tablet Starting Fri 3/30/2018, Historical Med      ONE TOUCH ULTRA TEST test strip TEST twice a day, Historical Med      ONETOUCH DELICA LANCETS FINE MISC TEST twice a day, Historical Med      rosuvastatin (CRESTOR) 20 MG tablet Starting Tue 3/20/2018, Historical Med      sotalol (BETAPACE) 80 mg tablet Starting Wed 5/15/2019, Historical Med           No discharge procedures on file      PDMP Review     None          ED Provider  Electronically Signed by           Isiah Us,   07/21/20 2366

## 2020-07-21 NOTE — DISCHARGE INSTRUCTIONS
Use the erythromycin ointment 4-6 times daily for 5 days to the left eye  Call the ophthalmologist to follow up tomorrow  Return to the emergency department for the following, but not limited to worsening pain in the eye, drainage from the eye, worsening vision, facial swelling, headache, fevers

## 2020-07-22 LAB
INPATIENT: NORMAL
SARS-COV-2 RNA SPEC QL NAA+PROBE: NOT DETECTED

## 2020-07-27 ENCOUNTER — TELEPHONE (OUTPATIENT)
Dept: OTHER | Facility: OTHER | Age: 60
End: 2020-07-27

## 2020-07-27 NOTE — TELEPHONE ENCOUNTER
Your test for COVID-19, also known as novel coronavirus, came back negative  You do not have COVID-19  If you have any additional questions, we can schedule a virtual visit for you with a provider or call the [facility] [phone] Option 7 for care advice  For additional information , please visit the Coronavirus FAQ on the [id number] [address]  ([facility]  org)  Patient is feeling fine, has no further needs at this time

## 2020-08-24 RX ORDER — EMPAGLIFLOZIN 25 MG/1
25 TABLET, FILM COATED ORAL DAILY
COMMUNITY

## 2020-08-24 NOTE — PRE-PROCEDURE INSTRUCTIONS
My Surgical Experience    The following information was developed to assist you to prepare for your operation  What do I need to do before coming to the hospital?   Arrange for a responsible person to drive you to and from the hospital    Arrange care for your children at home  Children are not allowed in the recovery areas of the hospital   Plan to wear clothing that is easy to put on and take off  If you are having shoulder surgery, wear a shirt that buttons or zippers in the front  Bathing  o Shower the evening before and the morning of your surgery with an antibacterial soap  Please refer to the Pre Op Showering Instructions for Surgery Patients Sheet   o Remove nail polish and all body piercing jewelry  o Do not shave any body part for at least 24 hours before surgery-this includes face, arms, legs and upper body  Food  o Nothing to eat or drink after midnight the night before your surgery  This includes candy and chewing gum  o Exception: If your surgery is after 12:00pm (noon), you may have clear liquids such as 7-Up®, ginger ale, apple or cranberry juice, Jell-O®, water, or clear broth until 8:00 am  o Do not drink milk or juice with pulp on the morning before surgery  o Do not drink alcohol 24 hours before surgery  Medicine  o Follow instructions you received from your surgeon about which medicines you may take on the day of surgery  o If instructed to take medicine on the morning of surgery, take pills with just a small sip of water  Call your prescribing doctor for specific infroamtion on what to do if you take insulin    What should I bring to the hospital?    Bring:  Paige Barthel or a walker, if you have them, for foot or knee surgery   A list of the daily medicines, vitamins, minerals, herbals and nutritional supplements you take   Include the dosages of medicines and the time you take them each day   Glasses, dentures or hearing aids   Minimal clothing; you will be wearing hospital sleepwear   Photo ID; required to verify your identity   If you have a Living Will or Power of , bring a copy of the documents   If you have an ostomy, bring an extra pouch and any supplies you use    Do not bring   Medicines or inhalers   Money, valuables or jewelry    What other information should I know about the day of surgery?  Notify your surgeons if you develop a cold, sore throat, cough, fever, rash or any other illness   Report to the Ambulatory Surgical/Same Day Surgery Unit   You will be instructed to stop at Registration only if you have not been pre-registered   Inform your  fi they do not stay that they will be asked by the staff to leave a phone number where they can be reached   Be available to be reached before surgery  In the event the operating room schedule changes, you may be asked to come in earlier or later than expected    *It is important to tell your doctor and others involved in your health care if you are taking or have been taking any non-prescription drugs, vitamins, minerals, herbals or other nutritional supplements  Any of these may interact with some food or medicines and cause a reaction      Pre-Surgery Instructions:   Medication Instructions    aspirin 81 mg chewable tablet Instructed patient per Anesthesia Guidelines   Blood Glucose Monitoring Suppl (ONE TOUCH ULTRA MINI) w/Device KIT Instructed patient per Anesthesia Guidelines   budesonide-formoterol (SYMBICORT) 160-4 5 mcg/act inhaler Instructed patient per Anesthesia Guidelines   cetirizine (ZyrTEC) 10 mg tablet Instructed patient per Anesthesia Guidelines   choline fenofibrate (TRILIPIX) 135 MG capsule Instructed patient per Anesthesia Guidelines   Empagliflozin (Jardiance) 25 MG TABS Instructed patient per Anesthesia Guidelines   losartan (COZAAR) 25 mg tablet Instructed patient per Anesthesia Guidelines      ONE TOUCH ULTRA TEST test strip Instructed patient per Anesthesia Guidelines   ONETOUCH DELICA LANCETS FINE MISC Instructed patient per Anesthesia Guidelines   rosuvastatin (CRESTOR) 20 MG tablet Instructed patient per Anesthesia Guidelines   sotalol (BETAPACE) 80 mg tablet Instructed patient per Anesthesia Guidelines  To take sotolol a m  of surgery

## 2020-08-25 DIAGNOSIS — Z20.822 COVID-19 RULED OUT BY LABORATORY TESTING: ICD-10-CM

## 2020-08-25 PROCEDURE — U0003 INFECTIOUS AGENT DETECTION BY NUCLEIC ACID (DNA OR RNA); SEVERE ACUTE RESPIRATORY SYNDROME CORONAVIRUS 2 (SARS-COV-2) (CORONAVIRUS DISEASE [COVID-19]), AMPLIFIED PROBE TECHNIQUE, MAKING USE OF HIGH THROUGHPUT TECHNOLOGIES AS DESCRIBED BY CMS-2020-01-R: HCPCS | Performed by: OPHTHALMOLOGY

## 2020-08-28 LAB — SARS-COV-2 RNA SPEC QL NAA+PROBE: NOT DETECTED

## 2020-08-31 ENCOUNTER — ANESTHESIA (OUTPATIENT)
Dept: PERIOP | Facility: AMBULARY SURGERY CENTER | Age: 60
End: 2020-08-31
Payer: COMMERCIAL

## 2020-08-31 ENCOUNTER — ANESTHESIA EVENT (OUTPATIENT)
Dept: PERIOP | Facility: AMBULARY SURGERY CENTER | Age: 60
End: 2020-08-31
Payer: COMMERCIAL

## 2020-08-31 ENCOUNTER — HOSPITAL ENCOUNTER (OUTPATIENT)
Facility: AMBULARY SURGERY CENTER | Age: 60
Setting detail: OUTPATIENT SURGERY
Discharge: HOME | End: 2020-08-31
Attending: OPHTHALMOLOGY | Admitting: OPHTHALMOLOGY
Payer: COMMERCIAL

## 2020-08-31 VITALS
SYSTOLIC BLOOD PRESSURE: 160 MMHG | OXYGEN SATURATION: 97 % | DIASTOLIC BLOOD PRESSURE: 74 MMHG | HEART RATE: 45 BPM | RESPIRATION RATE: 16 BRPM | TEMPERATURE: 96.9 F | BODY MASS INDEX: 37.16 KG/M2 | WEIGHT: 248 LBS

## 2020-08-31 DIAGNOSIS — H25.042 POSTERIOR SUBCAPSULAR POLAR AGE-RELATED CATARACT OF LEFT EYE: ICD-10-CM

## 2020-08-31 DIAGNOSIS — Z20.822 COVID-19 RULED OUT BY LABORATORY TESTING: Primary | ICD-10-CM

## 2020-08-31 LAB — GLUCOSE SERPL-MCNC: 106 MG/DL (ref 65–140)

## 2020-08-31 PROCEDURE — 82948 REAGENT STRIP/BLOOD GLUCOSE: CPT

## 2020-08-31 PROCEDURE — V2632 POST CHMBR INTRAOCULAR LENS: HCPCS | Performed by: OPHTHALMOLOGY

## 2020-08-31 DEVICE — ACRYSOF(R) IQ ASPHERIC NATURAL IOL, SINGLE-PIECE ACRYLIC FOLDABLE PCL, UV WITH BLUE LIGHTFILTER, 13.0MM LENGTH, 6.0MM ANTERIORASYMMETRIC BICONVEX OPTIC, PLANAR HAPTICS.
Type: IMPLANTABLE DEVICE | Site: EYE | Status: FUNCTIONAL
Brand: ACRYSOF®

## 2020-08-31 RX ORDER — BALANCED SALT SOLUTION 6.4; .75; .48; .3; 3.9; 1.7 MG/ML; MG/ML; MG/ML; MG/ML; MG/ML; MG/ML
SOLUTION OPHTHALMIC AS NEEDED
Status: DISCONTINUED | OUTPATIENT
Start: 2020-08-31 | End: 2020-08-31 | Stop reason: HOSPADM

## 2020-08-31 RX ORDER — KETOROLAC TROMETHAMINE 5 MG/ML
1 SOLUTION OPHTHALMIC 4 TIMES DAILY
Qty: 5 ML | Refills: 0
Start: 2020-08-31 | End: 2021-01-20

## 2020-08-31 RX ORDER — GATIFLOXACIN 5 MG/ML
SOLUTION/ DROPS OPHTHALMIC AS NEEDED
Status: DISCONTINUED | OUTPATIENT
Start: 2020-08-31 | End: 2020-08-31 | Stop reason: HOSPADM

## 2020-08-31 RX ORDER — TETRACAINE HYDROCHLORIDE 5 MG/ML
1 SOLUTION OPHTHALMIC ONCE
Status: COMPLETED | OUTPATIENT
Start: 2020-08-31 | End: 2020-08-31

## 2020-08-31 RX ORDER — GATIFLOXACIN 5 MG/ML
1 SOLUTION/ DROPS OPHTHALMIC 2 TIMES DAILY
Qty: 3 ML | Refills: 0
Start: 2020-08-31 | End: 2021-01-20

## 2020-08-31 RX ORDER — PHENYLEPHRINE HCL 2.5 %
1 DROPS OPHTHALMIC (EYE)
Status: COMPLETED | OUTPATIENT
Start: 2020-08-31 | End: 2020-08-31

## 2020-08-31 RX ORDER — LIDOCAINE HYDROCHLORIDE 20 MG/ML
1 JELLY TOPICAL
Status: COMPLETED | OUTPATIENT
Start: 2020-08-31 | End: 2020-08-31

## 2020-08-31 RX ORDER — TETRACAINE HYDROCHLORIDE 5 MG/ML
SOLUTION OPHTHALMIC AS NEEDED
Status: DISCONTINUED | OUTPATIENT
Start: 2020-08-31 | End: 2020-08-31 | Stop reason: HOSPADM

## 2020-08-31 RX ORDER — MIDAZOLAM HYDROCHLORIDE 2 MG/2ML
INJECTION, SOLUTION INTRAMUSCULAR; INTRAVENOUS AS NEEDED
Status: DISCONTINUED | OUTPATIENT
Start: 2020-08-31 | End: 2020-08-31

## 2020-08-31 RX ORDER — LIDOCAINE HYDROCHLORIDE 10 MG/ML
INJECTION, SOLUTION EPIDURAL; INFILTRATION; INTRACAUDAL; PERINEURAL AS NEEDED
Status: DISCONTINUED | OUTPATIENT
Start: 2020-08-31 | End: 2020-08-31 | Stop reason: HOSPADM

## 2020-08-31 RX ORDER — KETOROLAC TROMETHAMINE 5 MG/ML
1 SOLUTION OPHTHALMIC
Status: COMPLETED | OUTPATIENT
Start: 2020-08-31 | End: 2020-08-31

## 2020-08-31 RX ORDER — CYCLOPENTOLATE HYDROCHLORIDE 10 MG/ML
1 SOLUTION/ DROPS OPHTHALMIC
Status: COMPLETED | OUTPATIENT
Start: 2020-08-31 | End: 2020-08-31

## 2020-08-31 RX ADMIN — CYCLOPENTOLATE HYDROCHLORIDE 1 DROP: 10 SOLUTION/ DROPS OPHTHALMIC at 06:15

## 2020-08-31 RX ADMIN — KETOROLAC TROMETHAMINE 1 DROP: 5 SOLUTION OPHTHALMIC at 06:45

## 2020-08-31 RX ADMIN — PHENYLEPHRINE HYDROCHLORIDE 1 DROP: 25 SOLUTION/ DROPS OPHTHALMIC at 06:58

## 2020-08-31 RX ADMIN — LIDOCAINE HYDROCHLORIDE 1 APPLICATION: 20 JELLY TOPICAL at 06:30

## 2020-08-31 RX ADMIN — PHENYLEPHRINE HYDROCHLORIDE 1 DROP: 25 SOLUTION/ DROPS OPHTHALMIC at 06:45

## 2020-08-31 RX ADMIN — CYCLOPENTOLATE HYDROCHLORIDE 1 DROP: 10 SOLUTION/ DROPS OPHTHALMIC at 06:45

## 2020-08-31 RX ADMIN — PHENYLEPHRINE HYDROCHLORIDE 1 DROP: 25 SOLUTION/ DROPS OPHTHALMIC at 06:15

## 2020-08-31 RX ADMIN — MIDAZOLAM HYDROCHLORIDE 2 MG: 1 INJECTION, SOLUTION INTRAMUSCULAR; INTRAVENOUS at 07:00

## 2020-08-31 RX ADMIN — KETOROLAC TROMETHAMINE 1 DROP: 5 SOLUTION OPHTHALMIC at 06:15

## 2020-08-31 RX ADMIN — CYCLOPENTOLATE HYDROCHLORIDE 1 DROP: 10 SOLUTION/ DROPS OPHTHALMIC at 06:30

## 2020-08-31 RX ADMIN — LIDOCAINE HYDROCHLORIDE 1 APPLICATION: 20 JELLY TOPICAL at 06:45

## 2020-08-31 RX ADMIN — PHENYLEPHRINE HYDROCHLORIDE 1 DROP: 25 SOLUTION/ DROPS OPHTHALMIC at 06:30

## 2020-08-31 RX ADMIN — KETOROLAC TROMETHAMINE 1 DROP: 5 SOLUTION OPHTHALMIC at 06:30

## 2020-08-31 RX ADMIN — KETOROLAC TROMETHAMINE 1 DROP: 5 SOLUTION OPHTHALMIC at 06:58

## 2020-08-31 RX ADMIN — CYCLOPENTOLATE HYDROCHLORIDE 1 DROP: 10 SOLUTION/ DROPS OPHTHALMIC at 06:58

## 2020-08-31 RX ADMIN — LIDOCAINE HYDROCHLORIDE 1 APPLICATION: 20 JELLY TOPICAL at 06:15

## 2020-08-31 RX ADMIN — TETRACAINE HYDROCHLORIDE 1 DROP: 5 SOLUTION OPHTHALMIC at 06:15

## 2020-08-31 NOTE — DISCHARGE INSTRUCTIONS
Dr Suni Mac Cataract Instructions    Activity:     1  No Driving until instructed   2  Keep shield on until seen tomorrow except when administering drops   3  No heavy lifting   4  No water in eye     Diet:     1  Resume normal diet    Normal Symptoms:     1  Mild Headache   2  Scratchy or picky feeling around eye    Call the office if:     1  You have any questions or concerns   2  If eye pain is not relieved by extra strength tylenol    Office phone number:  [phone]      Next appointment:     1  See Dr Suni Mac at his office tomorrow as scheduled   _______9/1/20 at 10:45 am_________________________   2  Bring blue eye kit with you and eyedrops to the office    A new set of comprehensive instructions will be given and reviewed with you during your office visit tomorrow

## 2020-08-31 NOTE — OP NOTE
OPERATIVE REPORT    PATIENT NAME: Ana Worthington    :  1960  MRN: [id number]  Pt Location: [facility] OR ROOM 01    Surgery Date: 2020    Surgeon(s) and Role:     * Zander Dimas MD - Primary    Posterior subcapsular polar age-related cataract, left eye [H25 042]    Post-Op Diagnosis Codes:     * Posterior subcapsular polar age-related cataract, left eye [H25 042]    Procedure(s):  EXTRACTION EXTRACAPSULAR CATARACT PHACO INTRAOCULAR LENS (IOL)    Anesthesia Type:   IV Sedation with Anesthesia    Operative Indications:  Posterior subcapsular polar age-related cataract, left eye [H25 042]  Decreased vision to 20/50  With problems driving  Pt requested cataract sx the left eye    Procedure and Technique:    Procedure Details     The patient was brought in the OR in stable condition and placed on the operative table  The left eye was prepped and draped in the usual sterile manner  Attention was directed to the left eye where a lid speculum was placed  A 2 4 mm clear corneal incision was made temperally  1/2 cc of 1% MPF Lidocaine was irrigated into the anterior chamber followed by viscoat  The side port incision was placed superiorly  The capsularrhexis was made and the nucleus was hydrodissected with BSS  The nucleus was then removed with the phaco handpiece followed by removal of the cortical material with the I/A handpiece  The capsular bag was then filled with Provisc  The IOL was folded and placed in to the capsular bag and centered well  The remaining Provisc was removed from the eye with the I/A  The wounds were hydrated with BSS and found to be water tight  The lid speculum was removed and 2 drops of Gatifloxicin were placed over the cornea  A protective eye shield was taped over the eye and the patient went to PACU in stable condition  I will see the patient in the office tomorrow and the expected post op period is a few weeks         Complications: None        Disposition: PACU   Condition: Stable    SIGNATURE: Zander Dimas MD  DATE: August 31, 2020  TIME: 7:23 AM

## 2020-08-31 NOTE — ANESTHESIA POSTPROCEDURE EVALUATION
Post-Op Assessment Note    CV Status:  Stable  Pain Score: 0    Pain management: adequate     Mental Status:  Alert and awake   Hydration Status:  Stable   PONV Controlled:  None   Airway Patency:  Patent and adequate      Post Op Vitals Reviewed: Yes      Staff: CRNA         No complications documented      BP      Temp     Pulse    Resp      SpO2

## 2020-08-31 NOTE — ANESTHESIA PREPROCEDURE EVALUATION
Procedure:  EXTRACTION EXTRACAPSULAR CATARACT PHACO INTRAOCULAR LENS (IOL) (Left Eye)    Relevant Problems   CARDIO   (+) SOBOE (shortness of breath on exertion)      NEURO/PSYCH   (+) History of colon polyps      PULMONARY   (+) Mild intermittent asthma without complication   (+) MOUNIKA (obstructive sleep apnea)   (+) SOBOE (shortness of breath on exertion)        Physical Exam    Airway    Mallampati score: II  TM Distance: >3 FB  Neck ROM: full     Dental   No notable dental hx     Cardiovascular  Cardiovascular exam normal    Pulmonary  Pulmonary exam normal     Other Findings        Anesthesia Plan  ASA Score- 3     Anesthesia Type- IV sedation with anesthesia with ASA Monitors  Additional Monitors:   Airway Plan:           Plan Factors-Exercise tolerance (METS): >4 METS  Chart reviewed  EKG reviewed  Imaging results reviewed  Patient is a current smoker  Patient smoked on day of surgery  Obstructive sleep apnea risk education given perioperatively  Induction-     Postoperative Plan-     Informed Consent- Anesthetic plan and risks discussed with patient  I personally reviewed this patient with the CRNA  Discussed and agreed on the Anesthesia Plan with the CRNA  Nimo Uribe

## 2021-01-20 ENCOUNTER — APPOINTMENT (EMERGENCY)
Dept: RADIOLOGY | Facility: HOSPITAL | Age: 61
End: 2021-01-20
Payer: COMMERCIAL

## 2021-01-20 ENCOUNTER — HOSPITAL ENCOUNTER (EMERGENCY)
Facility: HOSPITAL | Age: 61
Discharge: HOME | End: 2021-01-20
Attending: EMERGENCY MEDICINE | Admitting: EMERGENCY MEDICINE
Payer: COMMERCIAL

## 2021-01-20 VITALS
OXYGEN SATURATION: 96 % | DIASTOLIC BLOOD PRESSURE: 62 MMHG | TEMPERATURE: 97.5 F | SYSTOLIC BLOOD PRESSURE: 111 MMHG | HEART RATE: 38 BPM | RESPIRATION RATE: 12 BRPM

## 2021-01-20 DIAGNOSIS — R07.9 CHEST PAIN: Primary | ICD-10-CM

## 2021-01-20 LAB
ALBUMIN SERPL BCP-MCNC: 3.7 G/DL (ref 3.5–5)
ALP SERPL-CCNC: 54 U/L (ref 46–116)
ALT SERPL W P-5'-P-CCNC: 43 U/L (ref 12–78)
ANION GAP SERPL CALCULATED.3IONS-SCNC: 4 MMOL/L (ref 4–13)
AST SERPL W P-5'-P-CCNC: 25 U/L (ref 5–45)
ATRIAL RATE: 42 BPM
BASOPHILS # BLD AUTO: 0.07 THOUSANDS/ΜL (ref 0–0.1)
BASOPHILS NFR BLD AUTO: 1 % (ref 0–1)
BILIRUB SERPL-MCNC: 0.3 MG/DL (ref 0.2–1)
BUN SERPL-MCNC: 23 MG/DL (ref 5–25)
CALCIUM SERPL-MCNC: 9.1 MG/DL (ref 8.3–10.1)
CHLORIDE SERPL-SCNC: 101 MMOL/L (ref 100–108)
CO2 SERPL-SCNC: 31 MMOL/L (ref 21–32)
CREAT SERPL-MCNC: 1.46 MG/DL (ref 0.6–1.3)
EOSINOPHIL # BLD AUTO: 0.39 THOUSAND/ΜL (ref 0–0.61)
EOSINOPHIL NFR BLD AUTO: 4 % (ref 0–6)
ERYTHROCYTE [DISTWIDTH] IN BLOOD BY AUTOMATED COUNT: 11.9 % (ref 11.6–15.1)
GFR SERPL CREATININE-BSD FRML MDRD: 51 ML/MIN/1.73SQ M
GLUCOSE SERPL-MCNC: 123 MG/DL (ref 65–140)
GLUCOSE SERPL-MCNC: 139 MG/DL (ref 65–140)
HCT VFR BLD AUTO: 51.2 % (ref 36.5–49.3)
HGB BLD-MCNC: 16.6 G/DL (ref 12–17)
IMM GRANULOCYTES # BLD AUTO: 0.02 THOUSAND/UL (ref 0–0.2)
IMM GRANULOCYTES NFR BLD AUTO: 0 % (ref 0–2)
INR PPP: 1.14 (ref 0.84–1.19)
LYMPHOCYTES # BLD AUTO: 3.08 THOUSANDS/ΜL (ref 0.6–4.47)
LYMPHOCYTES NFR BLD AUTO: 35 % (ref 14–44)
MCH RBC QN AUTO: 29.4 PG (ref 26.8–34.3)
MCHC RBC AUTO-ENTMCNC: 32.4 G/DL (ref 31.4–37.4)
MCV RBC AUTO: 91 FL (ref 82–98)
MONOCYTES # BLD AUTO: 0.77 THOUSAND/ΜL (ref 0.17–1.22)
MONOCYTES NFR BLD AUTO: 9 % (ref 4–12)
NEUTROPHILS # BLD AUTO: 4.52 THOUSANDS/ΜL (ref 1.85–7.62)
NEUTS SEG NFR BLD AUTO: 51 % (ref 43–75)
NRBC BLD AUTO-RTO: 0 /100 WBCS
P AXIS: 54 DEGREES
PLATELET # BLD AUTO: 236 THOUSANDS/UL (ref 149–390)
PMV BLD AUTO: 10.9 FL (ref 8.9–12.7)
POTASSIUM SERPL-SCNC: 4.8 MMOL/L (ref 3.5–5.3)
PR INTERVAL: 190 MS
PROT SERPL-MCNC: 7.7 G/DL (ref 6.4–8.2)
PROTHROMBIN TIME: 14.5 SECONDS (ref 11.6–14.5)
QRS AXIS: 69 DEGREES
QRSD INTERVAL: 110 MS
QT INTERVAL: 474 MS
QTC INTERVAL: 395 MS
RBC # BLD AUTO: 5.64 MILLION/UL (ref 3.88–5.62)
SODIUM SERPL-SCNC: 136 MMOL/L (ref 136–145)
T WAVE AXIS: 57 DEGREES
TROPONIN I SERPL-MCNC: <0.02 NG/ML
TROPONIN I SERPL-MCNC: <0.02 NG/ML
VENTRICULAR RATE: 42 BPM
WBC # BLD AUTO: 8.85 THOUSAND/UL (ref 4.31–10.16)

## 2021-01-20 PROCEDURE — 96360 HYDRATION IV INFUSION INIT: CPT

## 2021-01-20 PROCEDURE — 80053 COMPREHEN METABOLIC PANEL: CPT | Performed by: EMERGENCY MEDICINE

## 2021-01-20 PROCEDURE — 85025 COMPLETE CBC W/AUTO DIFF WBC: CPT | Performed by: EMERGENCY MEDICINE

## 2021-01-20 PROCEDURE — 99284 EMERGENCY DEPT VISIT MOD MDM: CPT | Performed by: EMERGENCY MEDICINE

## 2021-01-20 PROCEDURE — 93010 ELECTROCARDIOGRAM REPORT: CPT | Performed by: INTERNAL MEDICINE

## 2021-01-20 PROCEDURE — 71045 X-RAY EXAM CHEST 1 VIEW: CPT

## 2021-01-20 PROCEDURE — 85610 PROTHROMBIN TIME: CPT | Performed by: EMERGENCY MEDICINE

## 2021-01-20 PROCEDURE — 82948 REAGENT STRIP/BLOOD GLUCOSE: CPT

## 2021-01-20 PROCEDURE — 96361 HYDRATE IV INFUSION ADD-ON: CPT

## 2021-01-20 PROCEDURE — 93005 ELECTROCARDIOGRAM TRACING: CPT

## 2021-01-20 PROCEDURE — 36415 COLL VENOUS BLD VENIPUNCTURE: CPT | Performed by: EMERGENCY MEDICINE

## 2021-01-20 PROCEDURE — 84484 ASSAY OF TROPONIN QUANT: CPT | Performed by: EMERGENCY MEDICINE

## 2021-01-20 PROCEDURE — 99285 EMERGENCY DEPT VISIT HI MDM: CPT

## 2021-01-20 RX ORDER — SODIUM CHLORIDE 9 MG/ML
3 INJECTION INTRAVENOUS
Status: DISCONTINUED | OUTPATIENT
Start: 2021-01-20 | End: 2021-01-20

## 2021-01-20 RX ORDER — SODIUM CHLORIDE 9 MG/ML
1000 INJECTION, SOLUTION INTRAVENOUS ONCE
Status: COMPLETED | OUTPATIENT
Start: 2021-01-20 | End: 2021-01-20

## 2021-01-20 RX ADMIN — SODIUM CHLORIDE 1000 ML/HR: 0.9 INJECTION, SOLUTION INTRAVENOUS at 04:25

## 2021-01-20 NOTE — ED PROVIDER NOTES
History  Chief Complaint   Patient presents with    Chest Pain     Pt states chest pain off and on for 2-3 days, today pt states the pain traveled to his left shoulder  75-year-old man, past medical history significant for obesity, diabetes, hypertension, dyslipidemia, CAD, status post 2 stents in 2017 presenting with a 3 day history of intermittent and left chest discomfort at rest described as dull, pain recurred tonight around 2:00 a m  after he got up from sleep, was working on his computer and notice pain which radiated to left shoulder, lasting approximately 30 minutes, associated with palpitations and this made him anxious prompting him to come to the ER for evaluation     Denies shortness of breath, no nausea, no vomiting, no diaphoresis, no dizziness, no weakness  Denies trauma  Denies any recent illness, however patient states he is status post Moderma COVID vaccine 2 days ago  Denies cough, fever, chills  Patient has also had taste disturbance and mild diarrhea which preceded vaccine  Has good appetite  Patient's last stress echo approximately 8 months ago within normal limits  Patient took aspirin as well as his beta-blocker at home prior to arrival   Patient is chest pain-free at this time and has no other acute complaints  Cardiologist: Danya Foster       History provided by:  Patient   used: No        Prior to Admission Medications   Prescriptions Last Dose Informant Patient Reported? Taking?    Blood Glucose Monitoring Suppl (ONE TOUCH ULTRA MINI) w/Device KIT   Yes No   Empagliflozin (Jardiance) 25 MG TABS   Yes No   Sig: Take 25 mg by mouth daily at bedtime   ONE TOUCH ULTRA TEST test strip   Yes No   Sig: TEST twice a day   ONETOUCH DELICA LANCETS FINE MISC   Yes No   Sig: TEST twice a day   aspirin 81 mg chewable tablet   Yes No   choline fenofibrate (TRILIPIX) 135 MG capsule  Self Yes No   Sig: Take 135 mg by mouth daily   losartan (COZAAR) 25 mg tablet   Yes No Sig: Take 25 mg by mouth daily at bedtime    rosuvastatin (CRESTOR) 20 MG tablet   Yes No   Sig: daily at bedtime    sotalol (BETAPACE) 80 mg tablet   Yes No   Si (two) times a day Takes  1/2 tab bid      Facility-Administered Medications: None       Past Medical History:   Diagnosis Date    Colon polyp     Diabetes mellitus ([address] )     Hyperlipidemia     Sleep apnea        Past Surgical History:   Procedure Laterality Date    CARDIAC LOOP RECORDER      COLONOSCOPY      CORONARY STENT PLACEMENT N/A mar and oct 2017    CT XCAPSL CTRC RMVL INSJ IO LENS PROSTH W/O ECP Left 2020    Procedure: EXTRACTION EXTRACAPSULAR CATARACT PHACO INTRAOCULAR LENS (IOL); Surgeon: Romayne Negro, MD;  [facility] MA[facility] OR;  Service: Ophthalmology       Family History   Problem Relation Age of Onset    Colon cancer Maternal Grandmother     Breast cancer Mother     Other Mother     Heart Valve Disease Father     Leukemia Father     Migraines Sister     Heart failure Family      I have reviewed and agree with the history as documented  E-Cigarette/Vaping    E-Cigarette Use Never User      E-Cigarette/Vaping Substances     Social History     Tobacco Use    Smoking status: Current Every Day Smoker     Types: Cigars     Start date: 1992     Last attempt to quit: 2019     Years since quittin 5    Smokeless tobacco: Never Used    Tobacco comment: 2 a day  was 6 a day for 6 months only   Substance Use Topics    Alcohol use: Yes     Comment: 1-2 beers once a week    Drug use: No       Review of Systems   Constitutional: Negative for chills  HENT: Negative for congestion and sore throat  Taste disturbance   Eyes: Negative for visual disturbance  Gastrointestinal: Positive for diarrhea  Negative for abdominal distention  Mild diarrhea, improving   Genitourinary: Negative for difficulty urinating and flank pain  Musculoskeletal: Negative for neck pain and neck stiffness     Skin: Negative for wound  Allergic/Immunologic:        Status post Moderna Covid vaccine x1 couple days ago  Neurological: Negative for syncope  Psychiatric/Behavioral: The patient is nervous/anxious  Physical Exam  Physical Exam  Vitals signs and nursing note reviewed  Constitutional:       General: He is not in acute distress  Appearance: He is well-developed  He is obese  He is not ill-appearing, toxic-appearing or diaphoretic  HENT:      Head: Normocephalic and atraumatic  Eyes:      Extraocular Movements: Extraocular movements intact  Pupils: Pupils are equal, round, and reactive to light  Neck:      Musculoskeletal: Normal range of motion and neck supple  Cardiovascular:      Rate and Rhythm: Regular rhythm  Bradycardia present  Heart sounds: Normal heart sounds  Heart sounds not distant  Comments: History of bradycardia, also Status post beta-blocker prior to arrival  Pulmonary:      Effort: Pulmonary effort is normal       Breath sounds: Normal breath sounds  Chest:      Chest wall: No tenderness  Abdominal:      Palpations: Abdomen is soft  Tenderness: There is no abdominal tenderness  Comments: Obese   Musculoskeletal: Normal range of motion  Right lower leg: He exhibits no tenderness  No edema  Left lower leg: He exhibits no tenderness  No edema  Comments: Left shoulder - non-tender, F ROM, neurovascular intact distally   Skin:     General: Skin is warm and dry  Neurological:      General: No focal deficit present  Mental Status: He is alert and oriented to person, place, and time  Motor: No weakness  Psychiatric:         Mood and Affect: Mood is anxious  Behavior: Behavior normal  Behavior is not agitated           Vital Signs  ED Triage Vitals [01/20/21 0345]   Temperature Pulse Respirations Blood Pressure SpO2   97 5 °F (36 4 °C) (!) 41 18 169/82 99 %      Temp Source Heart Rate Source Patient Position - Orthostatic VS BP Location FiO2 (%)   Tympanic Monitor Sitting Right arm --      Pain Score       --           Vitals:    01/20/21 0615 01/20/21 0643 01/20/21 0745 01/20/21 0800   BP: 116/69 143/65 114/60 111/62   Pulse: (!) 38 63 (!) 38 (!) 38   Patient Position - Orthostatic VS: Lying Standing           Visual Acuity      ED Medications  Medications   sodium chloride 0 9 % infusion (0 mL/hr Intravenous Stopped 1/20/21 0640)       Diagnostic Studies  Results Reviewed     Procedure Component Value Units Date/Time    Fingerstick Glucose (POCT) [[id number]]  (Normal) Collected: 01/20/21 0418    Lab Status: Final result Updated: 01/20/21 1010     POC Glucose 123 mg/dl     Troponin I [[id number]]  (Normal) Collected: 01/20/21 0630    Lab Status: Final result Specimen: Blood from Vein Updated: 01/20/21 0657     Troponin I <0 02 ng/mL     Troponin I [[id number]]  (Normal) Collected: 01/20/21 0352    Lab Status: Final result Specimen: Blood from Arm, Left Updated: 01/20/21 0419     Troponin I <0 02 ng/mL     Comprehensive metabolic panel [[id number]]  (Abnormal) Collected: 01/20/21 0352    Lab Status: Final result Specimen: Blood from Arm, Left Updated: 01/20/21 0415     Sodium 136 mmol/L      Potassium 4 8 mmol/L      Chloride 101 mmol/L      CO2 31 mmol/L      ANION GAP 4 mmol/L      BUN 23 mg/dL      Creatinine 1 46 mg/dL      Glucose 139 mg/dL      Calcium 9 1 mg/dL      AST 25 U/L      ALT 43 U/L      Alkaline Phosphatase 54 U/L      Total Protein 7 7 g/dL      Albumin 3 7 g/dL      Total Bilirubin 0 30 mg/dL      eGFR 51 ml/min/1 73sq m     Narrative:      Jennie guidelines for Chronic Kidney Disease (CKD):     Stage 1 with normal or high GFR (GFR > 90 mL/min/1 73 square meters)    Stage 2 Mild CKD (GFR = 60-89 mL/min/1 73 square meters)    Stage 3A Moderate CKD (GFR = 45-59 mL/min/1 73 square meters)    Stage 3B Moderate CKD (GFR = 30-44 mL/min/1 73 square meters)    Stage 4 Severe CKD (GFR = 15-29 mL/min/1 73 square meters)    Stage 5 End Stage CKD (GFR <15 mL/min/1 73 square meters)  Note: GFR calculation is accurate only with a steady state creatinine    Protime-INR [[id number]]  (Normal) Collected: 01/20/21 0352    Lab Status: Final result Specimen: Blood from Arm, Left Updated: 01/20/21 0410     Protime 14 5 seconds      INR 1 14    CBC and differential [[id number]]  (Abnormal) Collected: 01/20/21 0352    Lab Status: Final result Specimen: Blood from Arm, Left Updated: 01/20/21 0358     WBC 8 85 Thousand/uL      RBC 5 64 Million/uL      Hemoglobin 16 6 g/dL      Hematocrit 51 2 %      MCV 91 fL      MCH 29 4 pg      MCHC 32 4 g/dL      RDW 11 9 %      MPV 10 9 fL      Platelets 188 Thousands/uL      nRBC 0 /100 WBCs      Neutrophils Relative 51 %      Immat GRANS % 0 %      Lymphocytes Relative 35 %      Monocytes Relative 9 %      Eosinophils Relative 4 %      Basophils Relative 1 %      Neutrophils Absolute 4 52 Thousands/µL      Immature Grans Absolute 0 02 Thousand/uL      Lymphocytes Absolute 3 08 Thousands/µL      Monocytes Absolute 0 77 Thousand/µL      Eosinophils Absolute 0 39 Thousand/µL      Basophils Absolute 0 07 Thousands/µL                  X-ray chest 1 view portable   Final Result by Anthony Glover MD (01/20 1521)      No acute cardiopulmonary disease                    Workstation performed: [id number]                    Procedures  Procedures         ED Course                                           MDM  Number of Diagnoses or Management Options  Diagnosis management comments: R/o ACS       Amount and/or Complexity of Data Reviewed  Clinical lab tests: ordered and reviewed  Tests in the radiology section of CPT®: ordered and reviewed  Tests in the medicine section of CPT®: reviewed and ordered  Decide to obtain previous medical records or to obtain history from someone other than the patient: yes  Review and summarize past medical records: yes  Independent visualization of images, tracings, or specimens: yes        Disposition  Final diagnoses:   Chest pain     Time reflects when diagnosis was documented in both MDM as applicable and the Disposition within this note     Time User Action Codes Description Comment    1/20/2021  8:07 AM Sharyle Brunt Add [R07 9] Chest pain       ED Disposition     ED Disposition Condition Date/Time Comment    Discharge Stable Wed Jan 20, 2021  8:07 AM Maki Acevedo discharge to home/self care  Follow-up Information     Follow up With Specialties Details Why Dilip Frey MD Cardiology Schedule an appointment as soon as possible for a visit   [address] [facility] [place]  [phone]            Discharge Medication List as of 1/20/2021  8:08 AM      CONTINUE these medications which have NOT CHANGED    Details   aspirin 81 mg chewable tablet Starting Tue 3/20/2018, Historical Med      Blood Glucose Monitoring Suppl (ONE TOUCH ULTRA MINI) w/Device KIT Starting Thu 5/10/2018, Historical Med      choline fenofibrate (TRILIPIX) 135 MG capsule Take 135 mg by mouth daily, Historical Med      Empagliflozin (Jardiance) 25 MG TABS Take 25 mg by mouth daily at bedtime, Historical Med      losartan (COZAAR) 25 mg tablet Take 25 mg by mouth daily at bedtime , Historical Med      ONE TOUCH ULTRA TEST test strip TEST twice a day, Historical Med      ONETOUCH DELICA LANCETS FINE MISC TEST twice a day, Historical Med      rosuvastatin (CRESTOR) 20 MG tablet daily at bedtime , Starting Tue 3/20/2018, Historical Med      sotalol (BETAPACE) 80 mg tablet 2 (two) times a day Takes  1/2 tab bid, Starting Wed 5/15/2019, Historical Med           No discharge procedures on file      PDMP Review     None          ED Provider  Electronically Signed by           Tod Vicente DO  02/16/21 2370

## 2021-01-20 NOTE — ED NOTES
Pt aaox4,pt standing and ambulating,denies dizziness,heart rate went to 50 bmp       Khadar Jolly RN  01/20/21 3043

## 2021-01-20 NOTE — ED NOTES
Pt aware care plan,HOB placed for comfort  Pt denies dizziness  lights dimmed       Breann Vasquez RN  01/20/21 1359

## 2021-01-20 NOTE — ED NOTES
Pt aaox4, states" I am having slight pressure under my L arm " 3/10       Garcia Aguilera RN  01/20/21 7774

## 2021-03-15 ENCOUNTER — HOSPITAL ENCOUNTER (OUTPATIENT)
Facility: HOSPITAL | Age: 61
Setting detail: OBSERVATION
Discharge: HOME | End: 2021-03-18
Attending: EMERGENCY MEDICINE | Admitting: FAMILY MEDICINE
Payer: COMMERCIAL

## 2021-03-15 DIAGNOSIS — Z72.0 NICOTINE ABUSE: ICD-10-CM

## 2021-03-15 DIAGNOSIS — M79.606 LEG PAIN: ICD-10-CM

## 2021-03-15 DIAGNOSIS — L03.90 CELLULITIS OF SKIN WITH LYMPHANGITIS: Primary | ICD-10-CM

## 2021-03-15 PROCEDURE — 99284 EMERGENCY DEPT VISIT MOD MDM: CPT | Performed by: EMERGENCY MEDICINE

## 2021-03-15 PROCEDURE — 99284 EMERGENCY DEPT VISIT MOD MDM: CPT

## 2021-03-15 RX ORDER — NITROGLYCERIN 0.4 MG/1
0.4 TABLET SUBLINGUAL
COMMUNITY

## 2021-03-16 ENCOUNTER — APPOINTMENT (OUTPATIENT)
Dept: RADIOLOGY | Facility: HOSPITAL | Age: 61
End: 2021-03-16
Payer: COMMERCIAL

## 2021-03-16 PROBLEM — I10 HYPERTENSION: Status: ACTIVE | Noted: 2021-03-16

## 2021-03-16 PROBLEM — L03.116 CELLULITIS OF LEFT LOWER EXTREMITY: Status: ACTIVE | Noted: 2021-03-16

## 2021-03-16 PROBLEM — N18.9 CKD (CHRONIC KIDNEY DISEASE): Status: ACTIVE | Noted: 2021-03-16

## 2021-03-16 PROBLEM — E11.9 TYPE 2 DIABETES MELLITUS (HCC): Status: ACTIVE | Noted: 2021-03-16

## 2021-03-16 PROBLEM — E66.9 OBESITY: Status: ACTIVE | Noted: 2021-03-16

## 2021-03-16 PROBLEM — Z95.818 STATUS POST PLACEMENT OF IMPLANTABLE LOOP RECORDER: Status: ACTIVE | Noted: 2021-03-16

## 2021-03-16 LAB
ALBUMIN SERPL BCP-MCNC: 3.7 G/DL (ref 3.5–5)
ALP SERPL-CCNC: 67 U/L (ref 46–116)
ALT SERPL W P-5'-P-CCNC: 36 U/L (ref 12–78)
ANION GAP SERPL CALCULATED.3IONS-SCNC: 11 MMOL/L (ref 4–13)
ANION GAP SERPL CALCULATED.3IONS-SCNC: 9 MMOL/L (ref 4–13)
APTT PPP: 30 SECONDS (ref 23–37)
AST SERPL W P-5'-P-CCNC: 17 U/L (ref 5–45)
BASOPHILS # BLD AUTO: 0.05 THOUSANDS/ΜL (ref 0–0.1)
BASOPHILS # BLD AUTO: 0.05 THOUSANDS/ΜL (ref 0–0.1)
BASOPHILS NFR BLD AUTO: 0 % (ref 0–1)
BASOPHILS NFR BLD AUTO: 0 % (ref 0–1)
BILIRUB SERPL-MCNC: 0.5 MG/DL (ref 0.2–1)
BUN SERPL-MCNC: 23 MG/DL (ref 5–25)
BUN SERPL-MCNC: 23 MG/DL (ref 5–25)
CALCIUM SERPL-MCNC: 8.5 MG/DL (ref 8.3–10.1)
CALCIUM SERPL-MCNC: 9 MG/DL (ref 8.3–10.1)
CHLORIDE SERPL-SCNC: 100 MMOL/L (ref 100–108)
CHLORIDE SERPL-SCNC: 99 MMOL/L (ref 100–108)
CO2 SERPL-SCNC: 27 MMOL/L (ref 21–32)
CO2 SERPL-SCNC: 27 MMOL/L (ref 21–32)
CREAT SERPL-MCNC: 1.43 MG/DL (ref 0.6–1.3)
CREAT SERPL-MCNC: 1.57 MG/DL (ref 0.6–1.3)
EOSINOPHIL # BLD AUTO: 0.22 THOUSAND/ΜL (ref 0–0.61)
EOSINOPHIL # BLD AUTO: 0.26 THOUSAND/ΜL (ref 0–0.61)
EOSINOPHIL NFR BLD AUTO: 2 % (ref 0–6)
EOSINOPHIL NFR BLD AUTO: 2 % (ref 0–6)
ERYTHROCYTE [DISTWIDTH] IN BLOOD BY AUTOMATED COUNT: 12 % (ref 11.6–15.1)
ERYTHROCYTE [DISTWIDTH] IN BLOOD BY AUTOMATED COUNT: 12.1 % (ref 11.6–15.1)
EST. AVERAGE GLUCOSE BLD GHB EST-MCNC: 160 MG/DL
GFR SERPL CREATININE-BSD FRML MDRD: 47 ML/MIN/1.73SQ M
GFR SERPL CREATININE-BSD FRML MDRD: 52 ML/MIN/1.73SQ M
GLUCOSE P FAST SERPL-MCNC: 203 MG/DL (ref 65–99)
GLUCOSE SERPL-MCNC: 108 MG/DL (ref 65–140)
GLUCOSE SERPL-MCNC: 138 MG/DL (ref 65–140)
GLUCOSE SERPL-MCNC: 142 MG/DL (ref 65–140)
GLUCOSE SERPL-MCNC: 159 MG/DL (ref 65–140)
GLUCOSE SERPL-MCNC: 161 MG/DL (ref 65–140)
GLUCOSE SERPL-MCNC: 203 MG/DL (ref 65–140)
HBA1C MFR BLD: 7.2 %
HBV CORE AB SER QL: NORMAL
HBV CORE IGM SER QL: NORMAL
HBV SURFACE AG SER QL: NORMAL
HCT VFR BLD AUTO: 48.3 % (ref 36.5–49.3)
HCT VFR BLD AUTO: 49.2 % (ref 36.5–49.3)
HCV AB SER QL: NORMAL
HGB BLD-MCNC: 15.7 G/DL (ref 12–17)
HGB BLD-MCNC: 16.2 G/DL (ref 12–17)
IMM GRANULOCYTES # BLD AUTO: 0.04 THOUSAND/UL (ref 0–0.2)
IMM GRANULOCYTES # BLD AUTO: 0.04 THOUSAND/UL (ref 0–0.2)
IMM GRANULOCYTES NFR BLD AUTO: 0 % (ref 0–2)
IMM GRANULOCYTES NFR BLD AUTO: 0 % (ref 0–2)
INR PPP: 1.07 (ref 0.84–1.19)
LYMPHOCYTES # BLD AUTO: 3.16 THOUSANDS/ΜL (ref 0.6–4.47)
LYMPHOCYTES # BLD AUTO: 3.55 THOUSANDS/ΜL (ref 0.6–4.47)
LYMPHOCYTES NFR BLD AUTO: 27 % (ref 14–44)
LYMPHOCYTES NFR BLD AUTO: 28 % (ref 14–44)
MAGNESIUM SERPL-MCNC: 1.8 MG/DL (ref 1.6–2.6)
MCH RBC QN AUTO: 29.2 PG (ref 26.8–34.3)
MCH RBC QN AUTO: 29.6 PG (ref 26.8–34.3)
MCHC RBC AUTO-ENTMCNC: 32.5 G/DL (ref 31.4–37.4)
MCHC RBC AUTO-ENTMCNC: 32.9 G/DL (ref 31.4–37.4)
MCV RBC AUTO: 89 FL (ref 82–98)
MCV RBC AUTO: 91 FL (ref 82–98)
MONOCYTES # BLD AUTO: 0.91 THOUSAND/ΜL (ref 0.17–1.22)
MONOCYTES # BLD AUTO: 1.3 THOUSAND/ΜL (ref 0.17–1.22)
MONOCYTES NFR BLD AUTO: 10 % (ref 4–12)
MONOCYTES NFR BLD AUTO: 8 % (ref 4–12)
NEUTROPHILS # BLD AUTO: 6.92 THOUSANDS/ΜL (ref 1.85–7.62)
NEUTROPHILS # BLD AUTO: 8.17 THOUSANDS/ΜL (ref 1.85–7.62)
NEUTS SEG NFR BLD AUTO: 61 % (ref 43–75)
NEUTS SEG NFR BLD AUTO: 62 % (ref 43–75)
NRBC BLD AUTO-RTO: 0 /100 WBCS
NRBC BLD AUTO-RTO: 0 /100 WBCS
PLATELET # BLD AUTO: 189 THOUSANDS/UL (ref 149–390)
PLATELET # BLD AUTO: 217 THOUSANDS/UL (ref 149–390)
PMV BLD AUTO: 11 FL (ref 8.9–12.7)
PMV BLD AUTO: 11.9 FL (ref 8.9–12.7)
POTASSIUM SERPL-SCNC: 3.8 MMOL/L (ref 3.5–5.3)
POTASSIUM SERPL-SCNC: 4.1 MMOL/L (ref 3.5–5.3)
PROCALCITONIN SERPL-MCNC: 0.06 NG/ML
PROT SERPL-MCNC: 7.7 G/DL (ref 6.4–8.2)
PROTHROMBIN TIME: 13.8 SECONDS (ref 11.6–14.5)
RBC # BLD AUTO: 5.3 MILLION/UL (ref 3.88–5.62)
RBC # BLD AUTO: 5.54 MILLION/UL (ref 3.88–5.62)
SODIUM SERPL-SCNC: 136 MMOL/L (ref 136–145)
SODIUM SERPL-SCNC: 137 MMOL/L (ref 136–145)
WBC # BLD AUTO: 11.3 THOUSAND/UL (ref 4.31–10.16)
WBC # BLD AUTO: 13.37 THOUSAND/UL (ref 4.31–10.16)

## 2021-03-16 PROCEDURE — 90682 RIV4 VACC RECOMBINANT DNA IM: CPT | Performed by: NURSE PRACTITIONER

## 2021-03-16 PROCEDURE — 85025 COMPLETE CBC W/AUTO DIFF WBC: CPT | Performed by: EMERGENCY MEDICINE

## 2021-03-16 PROCEDURE — 96365 THER/PROPH/DIAG IV INF INIT: CPT

## 2021-03-16 PROCEDURE — 85025 COMPLETE CBC W/AUTO DIFF WBC: CPT | Performed by: NURSE PRACTITIONER

## 2021-03-16 PROCEDURE — 73630 X-RAY EXAM OF FOOT: CPT

## 2021-03-16 PROCEDURE — 87040 BLOOD CULTURE FOR BACTERIA: CPT | Performed by: EMERGENCY MEDICINE

## 2021-03-16 PROCEDURE — 86705 HEP B CORE ANTIBODY IGM: CPT | Performed by: NURSE PRACTITIONER

## 2021-03-16 PROCEDURE — 83735 ASSAY OF MAGNESIUM: CPT | Performed by: NURSE PRACTITIONER

## 2021-03-16 PROCEDURE — 99244 OFF/OP CNSLTJ NEW/EST MOD 40: CPT | Performed by: INTERNAL MEDICINE

## 2021-03-16 PROCEDURE — 80048 BASIC METABOLIC PNL TOTAL CA: CPT | Performed by: NURSE PRACTITIONER

## 2021-03-16 PROCEDURE — 84145 PROCALCITONIN (PCT): CPT | Performed by: NURSE PRACTITIONER

## 2021-03-16 PROCEDURE — 87340 HEPATITIS B SURFACE AG IA: CPT | Performed by: NURSE PRACTITIONER

## 2021-03-16 PROCEDURE — 83036 HEMOGLOBIN GLYCOSYLATED A1C: CPT | Performed by: NURSE PRACTITIONER

## 2021-03-16 PROCEDURE — 86803 HEPATITIS C AB TEST: CPT | Performed by: NURSE PRACTITIONER

## 2021-03-16 PROCEDURE — 85730 THROMBOPLASTIN TIME PARTIAL: CPT | Performed by: EMERGENCY MEDICINE

## 2021-03-16 PROCEDURE — 80053 COMPREHEN METABOLIC PANEL: CPT | Performed by: EMERGENCY MEDICINE

## 2021-03-16 PROCEDURE — 86704 HEP B CORE ANTIBODY TOTAL: CPT | Performed by: NURSE PRACTITIONER

## 2021-03-16 PROCEDURE — 85610 PROTHROMBIN TIME: CPT | Performed by: EMERGENCY MEDICINE

## 2021-03-16 PROCEDURE — 36415 COLL VENOUS BLD VENIPUNCTURE: CPT | Performed by: EMERGENCY MEDICINE

## 2021-03-16 PROCEDURE — 90471 IMMUNIZATION ADMIN: CPT | Performed by: NURSE PRACTITIONER

## 2021-03-16 PROCEDURE — 82948 REAGENT STRIP/BLOOD GLUCOSE: CPT

## 2021-03-16 PROCEDURE — 99219 PR INITIAL OBSERVATION CARE/DAY 50 MINUTES: CPT | Performed by: INTERNAL MEDICINE

## 2021-03-16 RX ORDER — ACETAMINOPHEN 325 MG/1
650 TABLET ORAL EVERY 6 HOURS PRN
Status: DISCONTINUED | OUTPATIENT
Start: 2021-03-16 | End: 2021-03-18 | Stop reason: HOSPADM

## 2021-03-16 RX ORDER — CLINDAMYCIN PHOSPHATE 600 MG/50ML
600 INJECTION INTRAVENOUS ONCE
Status: COMPLETED | OUTPATIENT
Start: 2021-03-16 | End: 2021-03-16

## 2021-03-16 RX ORDER — ASPIRIN 81 MG/1
81 TABLET, CHEWABLE ORAL
Status: DISCONTINUED | OUTPATIENT
Start: 2021-03-16 | End: 2021-03-18 | Stop reason: HOSPADM

## 2021-03-16 RX ORDER — CEFAZOLIN SODIUM 2 G/50ML
2000 SOLUTION INTRAVENOUS EVERY 8 HOURS
Status: DISCONTINUED | OUTPATIENT
Start: 2021-03-16 | End: 2021-03-18

## 2021-03-16 RX ORDER — ONDANSETRON 2 MG/ML
4 INJECTION INTRAMUSCULAR; INTRAVENOUS EVERY 6 HOURS PRN
Status: DISCONTINUED | OUTPATIENT
Start: 2021-03-16 | End: 2021-03-18 | Stop reason: HOSPADM

## 2021-03-16 RX ORDER — ATORVASTATIN CALCIUM 40 MG/1
40 TABLET, FILM COATED ORAL
Status: DISCONTINUED | OUTPATIENT
Start: 2021-03-16 | End: 2021-03-18 | Stop reason: HOSPADM

## 2021-03-16 RX ORDER — FENOFIBRATE 145 MG/1
145 TABLET, COATED ORAL
Status: DISCONTINUED | OUTPATIENT
Start: 2021-03-16 | End: 2021-03-18 | Stop reason: HOSPADM

## 2021-03-16 RX ORDER — SOTALOL HYDROCHLORIDE 80 MG/1
40 TABLET ORAL EVERY 12 HOURS SCHEDULED
Status: DISCONTINUED | OUTPATIENT
Start: 2021-03-16 | End: 2021-03-18 | Stop reason: HOSPADM

## 2021-03-16 RX ORDER — SACCHAROMYCES BOULARDII 250 MG
250 CAPSULE ORAL 2 TIMES DAILY
Status: DISCONTINUED | OUTPATIENT
Start: 2021-03-16 | End: 2021-03-18 | Stop reason: HOSPADM

## 2021-03-16 RX ORDER — LOSARTAN POTASSIUM 25 MG/1
25 TABLET ORAL
Status: DISCONTINUED | OUTPATIENT
Start: 2021-03-16 | End: 2021-03-18 | Stop reason: HOSPADM

## 2021-03-16 RX ADMIN — INFLUENZA A VIRUS A/HAWAII/70/2019 (H1N1) RECOMBINANT HEMAGGLUTININ ANTIGEN, INFLUENZA A VIRUS A/MINNESOTA/41/2019 (H3N2) RECOMBINANT HEMAGGLUTININ ANTIGEN, INFLUENZA B VIRUS B/WASHINGTON/02/2019 RECOMBINANT HEMAGGLUTININ ANTIGEN, AND INFLUENZA B VIRUS B/PHUKET/3073/2013 RECOMBINANT HEMAGGLUTININ ANTIGEN 0.5 ML: 45; 45; 45; 45 INJECTION INTRAMUSCULAR at 09:51

## 2021-03-16 RX ADMIN — Medication 250 MG: at 09:53

## 2021-03-16 RX ADMIN — Medication 250 MG: at 17:36

## 2021-03-16 RX ADMIN — CEFAZOLIN SODIUM 2000 MG: 2 SOLUTION INTRAVENOUS at 05:23

## 2021-03-16 RX ADMIN — INSULIN LISPRO 1 UNITS: 100 INJECTION, SOLUTION INTRAVENOUS; SUBCUTANEOUS at 08:01

## 2021-03-16 RX ADMIN — FENOFIBRATE 145 MG: 145 TABLET ORAL at 21:24

## 2021-03-16 RX ADMIN — LOSARTAN POTASSIUM 25 MG: 25 TABLET, FILM COATED ORAL at 21:25

## 2021-03-16 RX ADMIN — CEFAZOLIN SODIUM 2000 MG: 2 SOLUTION INTRAVENOUS at 21:25

## 2021-03-16 RX ADMIN — CEFAZOLIN SODIUM 2000 MG: 2 SOLUTION INTRAVENOUS at 13:17

## 2021-03-16 RX ADMIN — LOSARTAN POTASSIUM 25 MG: 25 TABLET, FILM COATED ORAL at 03:55

## 2021-03-16 RX ADMIN — SOTALOL HYDROCHLORIDE 40 MG: 80 TABLET ORAL at 21:24

## 2021-03-16 RX ADMIN — CLINDAMYCIN IN 5 PERCENT DEXTROSE 600 MG: 12 INJECTION, SOLUTION INTRAVENOUS at 00:37

## 2021-03-16 RX ADMIN — FENOFIBRATE 145 MG: 145 TABLET ORAL at 03:55

## 2021-03-16 RX ADMIN — ATORVASTATIN CALCIUM 40 MG: 40 TABLET, FILM COATED ORAL at 21:24

## 2021-03-16 RX ADMIN — SOTALOL HYDROCHLORIDE 40 MG: 80 TABLET ORAL at 09:53

## 2021-03-16 RX ADMIN — ASPIRIN 81 MG CHEWABLE TABLET 81 MG: 81 TABLET CHEWABLE at 03:55

## 2021-03-16 RX ADMIN — ASPIRIN 81 MG CHEWABLE TABLET 81 MG: 81 TABLET CHEWABLE at 21:25

## 2021-03-16 RX ADMIN — INSULIN LISPRO 1 UNITS: 100 INJECTION, SOLUTION INTRAVENOUS; SUBCUTANEOUS at 13:16

## 2021-03-16 RX ADMIN — ATORVASTATIN CALCIUM 40 MG: 40 TABLET, FILM COATED ORAL at 03:54

## 2021-03-16 RX ADMIN — ENOXAPARIN SODIUM 105 MG: 120 INJECTION SUBCUTANEOUS at 01:37

## 2021-03-16 NOTE — ASSESSMENT & PLAN NOTE
Implant 3 years ago  Denies diagnosis of atrial fibrillation or atrial flutter  Patient is on sotalol  Will continue  Heart rate 49 to 50s on ED monitor during exam   Follow-up primary cardiologist post discharge

## 2021-03-16 NOTE — ASSESSMENT & PLAN NOTE
Patient presented with left lower leg redness, edema, pain, with extension to left inner thigh since Sunday night  Precipitated by standing on his feet for several hours on Sunday for fundraising  Denies history of lower extremity cellulitis or bad infections  Denies history of PE/DVT or family history of DVT PE  WBC 13 37, patient afebrile  Concerning for lymphangitis and need to rule out DVT    Patient received IV clindamycin and therapeutic Lovenox in ED   Will change IV antibiotic to IV Ancef  Venous Doppler to rule out DVT  Elevation  Consult ID

## 2021-03-16 NOTE — CONSULTS
Consultation - Infectious Disease   Ivett Sor 56 y o  male MRN: [id number]  Unit/Bed#: [address] [place] [address] Encounter: [id number]    Extensive review of the medical records in Epic including review of the notes, radiographs, and laboratory results  IMPRESSION/RECOMMENDATIONS:    Cellulitis of distal left lower extremity with lymphangitis streaking to left medial thigh  Suspect streptococcal infection  Infection began after prolonged standing on his feet while fundraising on Sunday  Patient says he stepped on a sharp object around 1-2 weeks ago  o Left leg elevation advised  o X-ray left foot to exclude the presence of foreign body  o Continue cefazolin 2 g IV q 8 hours  o Monitor clinical response, temperature curve  o Repeat CBC in a m   Leukocytosis:  Likely due to cellulitis  WBC count is improving  o Repeat CBC in a m   Diabetes mellitus type 2 with mild hyperglycemia  Adequate outpatient glycemic control with A1c of 7 2 on 03/16/21  This is a risk factor for infection and delayed wound healing   o Glycemic control per primary team    Morbid obesity with BMI of 37 5    My recommendations were discussed with the patient in detail who verbalized understanding  His questions were answered by me  My recommendations were discussed with Dr Enrique Loco, from the primary service  Thank you for allowing me to participate in the care of this patient  The ID service will follow  HISTORY OF PRESENT ILLNESS:  Reason for Consult: cellulitis with lymphangitis  HPI: Ivett Napoles is a 56y o  year old man with PMH of DM2, CAD, obesity who was admitted yesterday with left leg redness, pain swelling since Sunday night  He says that he was standing for nearly 8 hours while fundraising on Sunday  He thereafter noticed that his left leg was swollen painful and red later that night  The redness extended to his left inner thigh  He denies fever or chills    He says about 1-2 weeks ago he stepped on something sharp while walking barefoot in his house  He denies bleeding and did not notice a foreign object  However, he had severe pain that lasted about 1 day  He denies a prior history of cellulitis  He was diagnosed with cellulitis of his left leg with lymphangitis  He received clindamycin IV in the emergency room which was changed to cefazolin IV upon admission  He is tolerating cefazolin iv thus far  REVIEW OF SYSTEMS:  Constitutional: Negative for fever, chills  HENT: +Nasal congestion  Negative runny nose, sore throat  Eyes: Negative for change in vision  Respiratory: Negative cough, shortness of breath  Cardiovascular: Negative for chest pain, palpitations  Gastrointestinal: Negative for abdominal pain, nausea, vomiting, diarrhea  Genitourinary:  Negative for dysuria, hematuria  Musculoskeletal:  left leg redness pain and swelling and hand arthritis  Skin:  Left leg redness  Negative for wound  Neurological:  occasional numbness , tingling of his feet  Hematological: Negative for excessive bruising/bleeding  Psychiatric/Behavioral: Negative for depression, anxiety  PAST MEDICAL HISTORY:  Past Medical History:   Diagnosis Date    CAD (coronary artery disease)     Colon polyp     Diabetes mellitus [address] [address]     Hyperlipidemia     Sleep apnea      Past Surgical History:   Procedure Laterality Date    CARDIAC LOOP RECORDER      COLONOSCOPY      CORONARY STENT PLACEMENT N/A mar and oct 2017    SD XCAPSL CTRC RMVL INSJ IO LENS PROSTH W/O ECP Left 8/31/2020    Procedure: EXTRACTION EXTRACAPSULAR CATARACT PHACO INTRAOCULAR LENS (IOL);   Surgeon: Michelle Hood MD;  Location: [facility] MAIN OR;  Service: Ophthalmology     FAMILY HISTORY:  Negative for immunodeficiency    SOCIAL HISTORY:  Social History   Social History     Substance and Sexual Activity   Alcohol Use Yes    Comment: 1-2 beers once a week     Social History     Substance and Sexual Activity   Drug Use No     Social History Tobacco Use   Smoking Status Current Every Day Smoker    Types: Cigars    Start date: 1992    Last attempt to quit: 2019    Years since quittin 6   Smokeless Tobacco Never Used   Tobacco Comment    2 a day  was 6 a day for 6 months only   Retired from [employer]    ALLERGIES:  No Known Allergies    MEDICATIONS:  All current active medications have been reviewed    Antibiotics: cefazolin iv  Scheduled Meds:  Current Facility-Administered Medications   Medication Dose Route Frequency Provider Last Rate    acetaminophen  650 mg Oral Q6H PRN Cuiyin Yurik, CRNP      aspirin  81 mg Oral HS Cuiyin Yurik, CRNP      atorvastatin  40 mg Oral HS Cuiyin Yurik, CRNP      cefazolin  2,000 mg Intravenous Q8H Cuiyin Yurik, CRNP 2,000 mg (21 1317)    Empagliflozin  25 mg Oral HS Cuiyin Yurik, CRNP      fenofibrate  145 mg Oral HS Cuiyin Yurik, CRNP      insulin lispro  1-5 Units Subcutaneous TID AC Cuiyin Yurik, CRNP      insulin lispro  1-5 Units Subcutaneous HS Cuiyin Yurik, CRNP      losartan  25 mg Oral HS Cuiyin Yurik, CRNP      nicotine  1 patch Transdermal Daily Cuiyin Leesarik, CRNP      ondansetron  4 mg Intravenous Q6H PRN Cuiyin Yurik, CRNP      psyllium  1 packet Oral Daily Cuiyin Yurik, CRNP      saccharomyces boulardii  250 mg Oral BID Cuiyin Leesarik, CRNP      sotalol  40 mg Oral Q12H [facility] Cuimoy Lindak, JUDYNP       Continuous Infusions:   PRN Meds:   acetaminophen    ondansetron     PHYSICAL EXAM:  Temp:  [96 9 °F (36 1 °C)-98 6 °F (37 °C)] 98 6 °F (37 °C)  HR:  [48-61] 52  Resp:  [16-18] 18  BP: (131-171)/(60-80) 131/60  SpO2:  [96 %-98 %] 96 %  Temp (24hrs), Av 7 °F (36 5 °C), Min:96 9 °F (36 1 °C), Max:98 6 °F (37 °C)  Current: Temperature: 98 6 °F (37 °C)    Intake/Output Summary (Last 24 hours) at 3/16/2021 1709  Last data filed at 3/16/2021 0900  Gross per 24 hour   Intake 290 ml   Output --   Net 290 ml     General Appearance:  Appearing well, nontoxic, and in no acute distress   Head:  Normocephalic, without obvious abnormality, atraumatic   Eyes:  EOMI, Conjunctiva pink and sclera anicteric, both eyes   Nose: Nares normal, mucosa normal, no drainage   Throat: Oropharynx moist    Neck: Supple   Lungs:  Clear to auscultation bilaterally, respirations unlabored   Heart:   S1, S2, regular rate,rhythm, no murmur   Abdomen: Obese, soft, non-tender, non-distended   :  No Ott catheter present   Extremities:   Left distal leg edema   Skin: Distal left leg erythema with lymphangitic streaking left medial which is slightly tender to palpation  No drainage  No open or draining wounds noted from feet  Dry skin of b/l legs and feet noted  Neurologic: Alert and oriented x  3, conversant, fluent speech   Psychiatric: Calm, cooperative with exam and interview     LABS, IMAGING, & OTHER STUDIES:  Lab Results:  I have personally reviewed pertinent labs  Results from last 7 days   Lab Units 03/16/21  0602 03/16/21  0025   WBC Thousand/uL 11 30* 13 37*   HEMOGLOBIN g/dL 15 7 16 2   PLATELETS Thousands/uL 189 217     Results from last 7 days   Lab Units 03/16/21  0602 03/16/21  0025   SODIUM mmol/L 136 137   POTASSIUM mmol/L 3 8 4 1   CHLORIDE mmol/L 100 99*   CO2 mmol/L 27 27   BUN mg/dL 23 23   CREATININE mg/dL 1 57* 1 43*   EGFR ml/min/1 73sq m 47 52   CALCIUM mg/dL 8 5 9 0   AST U/L  --  17   ALT U/L  --  36   ALK PHOS U/L  --  67     Results from last 7 days   Lab Units 03/16/21  0025   BLOOD CULTURE  Received in Microbiology Lab  Culture in Progress  Received in Microbiology Lab  Culture in Progress  Results from last 7 days   Lab Units 03/16/21  0602   PROCALCITONIN ng/ml 0 06     Imaging Studies:   Venous duplex of lower limbs: pending  I have personally reviewed pertinent imaging study reports

## 2021-03-16 NOTE — ASSESSMENT & PLAN NOTE
Patient is on losartan, sotalol at home  Will continue  BP elevated in ED likely reactive  Improving

## 2021-03-16 NOTE — ASSESSMENT & PLAN NOTE
Lab Results   Component Value Date    HGBA1C 7 3 04/29/2019       No results for input(s): POCGLU in the last 72 hours  Blood Sugar Average: Last 72 hrs:   check A1c  Patient is on Jardiance at home  Continue    SSI  Diabetic diet

## 2021-03-16 NOTE — UTILIZATION REVIEW
Initial Clinical Review    Admission: Date/Time/Statement:   Admission Orders (From admission, onward)     Ordered        03/16/21 0106  Place in Observation  Once                   Orders Placed This Encounter   Procedures    Place in Observation     Standing Status:   Standing     Number of Occurrences:   1     Order Specific Question:   Level of Care     Answer:   Med Surg [16]     ED Arrival Information     Expected Arrival Acuity Means of Arrival Escorted By Service Admission Type    - 3/15/2021 22:27 Urgent Walk-In Self Hospitalist Urgent    Arrival Complaint    swelling in left leg        Chief Complaint   Patient presents with    Leg Swelling     noted L lower leg swelling, redness and rash to area last night  pt diabetic, has not checked sugars     Assessment/Plan:  56 y o  male with PMH of type 2 diabetes, CAD, hypertension, hyperlipidemia, asthma, sleep apnea, obesity who presents with left lower leg redness pain edema since Michele night  Patient states he was standing for several hours for fun raising on Sunday  He noticed his left leg is swollen painful and reddened on Sunday night  Redness pain edema extends to left inner thigh  He denies fever, chills  Denies history of cellulitis or bad infection  Patient denies chest pain, headache, dizziness, SOB, nausea, vomiting, diarrhea, constipation  Reports history of diabetes, normally checks blood sugar every day  Last check was on Friday which was around 150's  Cellulitis of left lower extremity  Assessment & Plan  Patient presented with left lower leg redness, edema, pain, with extension to left inner thigh since Sunday night  Precipitated by standing on his feet for several hours on Sunday for fundraising  Denies history of lower extremity cellulitis or bad infections  Denies history of PE/DVT or family history of DVT PE  WBC 13 37, patient afebrile  Concerning for lymphangitis and need to rule out DVT    Patient received IV clindamycin and therapeutic Lovenox in ED   Will change IV antibiotic to IV Ancef  Venous Doppler to rule out DVT  Elevation  Consult ID  Type 2 diabetes mellitus ([address] )  Assessment & Plan        Lab Results   Component Value Date     HGBA1C 7 3 04/29/2019         No results for input(s): POCGLU in the last 72 hours      Blood Sugar Average: Last 72 hrs:   check A1c  Patient is on Jardiance at home  Continue  SSI  Diabetic diet        CAD (coronary artery disease)  Assessment & Plan  Status post PCI with stents x2 in March and October 2017  Continue aspirin, sotalol, statin  Patient follows Dr Cali Monet as outpatient     CKD (chronic kidney disease)  Assessment & Plan        Lab Results   Component Value Date     EGFR 52 03/16/2021     EGFR 51 01/20/2021     EGFR 58 8 03/29/2017     CREATININE 1 43 (H) 03/16/2021     CREATININE 1 46 (H) 01/20/2021     CREATININE 1 26 03/29/2017   Baseline creatinine around 1 4's  Creatinine stable  Monitor     Status post placement of implantable loop recorder  Assessment & Plan  Implant 3 years ago  Denies diagnosis of atrial fibrillation or atrial flutter  Patient is on sotalol  Will continue  Heart rate 49 to 50s on ED monitor during exam   Follow-up primary cardiologist post discharge         Hypertension  Assessment & Plan  Patient is on losartan, sotalol at home  Will continue  BP elevated in ED likely reactive  Improving      Obesity  Assessment & Plan  Body mass index is 36 56 kg/m²  Diet and lifestyle modification     Hyperlipidemia  Assessment & Plan  Continue Trilipix and statin      MOUNIKA (obstructive sleep apnea)  Assessment & Plan  Intolerant to mask during sleep study  Advised patient to follow-up with PCP post discharge      Mild intermittent asthma without complication  Assessment & Plan  Noted history    No signs of exacerbation       ED Triage Vitals [03/15/21 2238]   Temperature Pulse Respirations Blood Pressure SpO2   97 5 °F (36 4 °C) 61 16 (!) 171/80 97 % Temp Source Heart Rate Source Patient Position - Orthostatic VS BP Location FiO2 (%)   Tympanic Monitor Sitting Right arm --      Pain Score       2          Wt Readings from Last 1 Encounters:   03/16/21 109 kg (239 lb 6 7 oz)     Additional Vital Signs:   Pertinent Labs/Diagnostic Test Results:       Results from last 7 days   Lab Units 03/16/21  0602 03/16/21  0025   WBC Thousand/uL 11 30* 13 37*   HEMOGLOBIN g/dL 15 7 16 2   HEMATOCRIT % 48 3 49 2   PLATELETS Thousands/uL 189 217   NEUTROS ABS Thousands/µL 6 92 8 17*         Results from last 7 days   Lab Units 03/16/21  0602 03/16/21  0025   SODIUM mmol/L 136 137   POTASSIUM mmol/L 3 8 4 1   CHLORIDE mmol/L 100 99*   CO2 mmol/L 27 27   ANION GAP mmol/L 9 11   BUN mg/dL 23 23   CREATININE mg/dL 1 57* 1 43*   EGFR ml/min/1 73sq m 47 52   CALCIUM mg/dL 8 5 9 0   MAGNESIUM mg/dL 1 8  --      Results from last 7 days   Lab Units 03/16/21  0025   AST U/L 17   ALT U/L 36   ALK PHOS U/L 67   TOTAL PROTEIN g/dL 7 7   ALBUMIN g/dL 3 7   TOTAL BILIRUBIN mg/dL 0 50     Results from last 7 days   Lab Units 03/16/21  0740 03/16/21  0315   POC GLUCOSE mg/dl 161* 108     Results from last 7 days   Lab Units 03/16/21  0602 03/16/21  0025   GLUCOSE RANDOM mg/dL 203* 138             No results found for: BETA-HYDROXYBUTYRATE                           Results from last 7 days   Lab Units 03/16/21  0025   PROTIME seconds 13 8   INR  1 07   PTT seconds 30                                                                                               ED Treatment:   Medication Administration from 03/15/2021 2227 to 03/16/2021 0237       Date/Time Order Dose Route Action Action by Comments     03/16/2021 0110 clindamycin (CLEOCIN) IVPB (premix in dextrose) 600 mg 50 mL 0 mg Intravenous Stopped Jody Monteiro RN      03/16/2021 0037 clindamycin (CLEOCIN) IVPB (premix in dextrose) 600 mg 50 mL 600 mg Intravenous New [address] DENISE 1215 Randy Meléndez RN      03/16/2021 0137 enoxaparin (LOVENOX) subcutaneous injection 105 mg 105 mg Subcutaneous Given Helen Ro RN         Past Medical History:   Diagnosis Date    CAD (coronary artery disease)     Colon polyp     Diabetes mellitus ([facility] )     Hyperlipidemia     Sleep apnea      Present on Admission:   Mild intermittent asthma without complication   MOUNIKA (obstructive sleep apnea)   CAD (coronary artery disease)   Hyperlipidemia      Admitting Diagnosis: Cellulitis of skin with lymphangitis [L03 90]  Leg pain [M79 606]  Leg swelling [M79 89]  Age/Sex: 56 y o  male  Admission Orders:  Scheduled Medications:  aspirin, 81 mg, Oral, HS  atorvastatin, 40 mg, Oral, HS  cefazolin, 2,000 mg, Intravenous, Q8H  Empagliflozin, 25 mg, Oral, HS  fenofibrate, 145 mg, Oral, HS  influenza vaccine, 0 5 mL, Intramuscular, Once  insulin lispro, 1-5 Units, Subcutaneous, TID AC  insulin lispro, 1-5 Units, Subcutaneous, HS  losartan, 25 mg, Oral, HS  nicotine, 1 patch, Transdermal, Daily  psyllium, 1 packet, Oral, Daily  saccharomyces boulardii, 250 mg, Oral, BID  sotalol, 40 mg, Oral, Q12H REBEKAH      Continuous IV Infusions:     PRN Meds:  acetaminophen, 650 mg, Oral, Q6H PRN  ondansetron, 4 mg, Intravenous, Q6H PRN        IP CONSULT TO INFECTIOUS DISEASES    Network Utilization Review Department  ATTENTION: Please call with any questions or concerns to [phone] and carefully listen to the prompts so that you are directed to the right person  All voicemails are confidential   Frances Alexandra all requests for admission clinical reviews, approved or denied determinations and any other requests to dedicated fax number below belonging to the campus where the patient is receiving treatment   List of dedicated fax numbers for the Facilities:  [address] DENIALS (Administrative/Medical Necessity) [phone]   [address] [place] [address] (Maternity/NICU/Pediatrics) [phone]   5000 [facility] [phone]   8049 [facility] [phone]   [facility] Dayne Rausch [address] ([address]) [address] [address] Odell Aguilar [facility] [phone]   [place] [address] [phone]

## 2021-03-16 NOTE — ASSESSMENT & PLAN NOTE
Lab Results   Component Value Date    EGFR 52 03/16/2021    EGFR 51 01/20/2021    EGFR 58 8 03/29/2017    CREATININE 1 43 (H) 03/16/2021    CREATININE 1 46 (H) 01/20/2021    CREATININE 1 26 03/29/2017   Baseline creatinine around 1 4's  Creatinine stable  Monitor

## 2021-03-16 NOTE — H&P
[address]  H&P- Cally Lebron 1960, 56 y o  male MRN: [id number]  Unit/Bed#: ED 07 Encounter: [id number]  Primary Care Provider: Rosa Cordova MD   Date and time admitted to hospital: 3/15/2021 11:25 PM    * Cellulitis of left lower extremity  Assessment & Plan  Patient presented with left lower leg redness, edema, pain, with extension to left inner thigh since Sunday night  Precipitated by standing on his feet for several hours on Sunday for fundraising  Denies history of lower extremity cellulitis or bad infections  Denies history of PE/DVT or family history of DVT PE  WBC 13 37, patient afebrile  Concerning for lymphangitis and need to rule out DVT  Patient received IV clindamycin and therapeutic Lovenox in ED   Will change IV antibiotic to IV Ancef  Venous Doppler to rule out DVT  Elevation  Consult ID        Type 2 diabetes mellitus ([address] )  Assessment & Plan  Lab Results   Component Value Date    HGBA1C 7 3 04/29/2019       No results for input(s): POCGLU in the last 72 hours  Blood Sugar Average: Last 72 hrs:   check A1c  Patient is on Jardiance at home  Continue  SSI  Diabetic diet      CAD (coronary artery disease)  Assessment & Plan  Status post PCI with stents x2 in March and October 2017  Continue aspirin, sotalol, statin  Patient follows Dr Home Gustafson as outpatient    CKD (chronic kidney disease)  Assessment & Plan  Lab Results   Component Value Date    EGFR 52 03/16/2021    EGFR 51 01/20/2021    EGFR 58 8 03/29/2017    CREATININE 1 43 (H) 03/16/2021    CREATININE 1 46 (H) 01/20/2021    CREATININE 1 26 03/29/2017   Baseline creatinine around 1 4's  Creatinine stable  Monitor    Status post placement of implantable loop recorder  Assessment & Plan  Implant 3 years ago  Denies diagnosis of atrial fibrillation or atrial flutter  Patient is on sotalol     Will continue  Heart rate 49 to 50s on ED monitor during exam   Follow-up primary cardiologist post discharge  Hypertension  Assessment & Plan  Patient is on losartan, sotalol at home  Will continue  BP elevated in ED likely reactive  Improving  Obesity  Assessment & Plan  Body mass index is 36 56 kg/m²  Diet and lifestyle modification    Hyperlipidemia  Assessment & Plan  Continue Trilipix and statin  MOUNIKA (obstructive sleep apnea)  Assessment & Plan  Intolerant to mask during sleep study  Advised patient to follow-up with PCP post discharge  Mild intermittent asthma without complication  Assessment & Plan  Noted history  No signs of exacerbation    VTE Prophylaxis: Enoxaparin (Lovenox)  / reason for no mechanical VTE prophylaxis Leg edema   Code Status:  Full code  POLST: POLST form is not discussed and not completed at this time  Anticipated Length of Stay:  Patient will be admitted on an Observation basis with an anticipated length of stay of  < 2 midnights  Justification for Hospital Stay:  Left lower extremity cellulitis with possible lymphangitis    Total Time for Visit, including Counseling / Coordination of Care: 45 minutes  Greater than 50% of this total time spent on direct patient counseling and coordination of care  Chief Complaint:   Left lower leg redness pain edema since Michele night    History of Present Illness:    Jane Roberto is a 56 y o  male with PMH of type 2 diabetes, CAD, hypertension, hyperlipidemia, asthma, sleep apnea, obesity who presents with left lower leg redness pain edema since Michele night  Patient states he was standing for several hours for fun raising on Sunday  He noticed his left leg is swollen painful and reddened on Sunday night  Redness pain edema extends to left inner thigh  He denies fever, chills  Denies history of cellulitis or bad infection  Patient denies chest pain, headache, dizziness, SOB, nausea, vomiting, diarrhea, constipation  Reports history of diabetes, normally checks blood sugar every day    Last check was on Friday which was around 150's  Review of Systems:    Review of Systems   Musculoskeletal:        Left leg pain, redness, edema since Sunday night   Skin:        Redness to left lower leg since Sunday night   All other systems reviewed and are negative  Past Medical and Surgical History:     Past Medical History:   Diagnosis Date    CAD (coronary artery disease)     Colon polyp     Diabetes mellitus ([address] )     Hyperlipidemia     Sleep apnea        Past Surgical History:   Procedure Laterality Date    CARDIAC LOOP RECORDER      COLONOSCOPY      CORONARY STENT PLACEMENT N/A mar and oct 2017    HI XCAPSL CTRC RMVL INSJ IO LENS PROSTH W/O ECP Left 8/31/2020    Procedure: EXTRACTION EXTRACAPSULAR CATARACT PHACO INTRAOCULAR LENS (IOL); Surgeon: June Van MD;  Location: [facility] MAIN OR;  Service: Ophthalmology       Meds/Allergies:    Prior to Admission medications    Medication Sig Start Date End Date Taking?  Authorizing Provider   aspirin 81 mg chewable tablet Chew 81 mg daily at bedtime  3/20/18  Yes Historical Provider, MD   nitroglycerin (NITROSTAT) 0 4 mg SL tablet Place 0 4 mg under the tongue every 5 (five) minutes as needed for chest pain   Yes Historical Provider, MD   psyllium (METAMUCIL) 58 6 % packet Take 1 packet by mouth daily   Yes Historical Provider, MD   Blood Glucose Monitoring Suppl (ONE TOUCH ULTRA MINI) w/Device KIT  5/10/18   Historical Provider, MD   choline fenofibrate (TRILIPIX) 135 MG capsule Take 135 mg by mouth daily at bedtime     Historical Provider, MD   Empagliflozin (Jardiance) 25 MG TABS Take 25 mg by mouth daily at bedtime    Historical Provider, MD   losartan (COZAAR) 25 mg tablet Take 25 mg by mouth daily at bedtime     Historical Provider, MD   ONE TOUCH ULTRA TEST test strip TEST twice a day 5/9/18   Historical Provider, MD Lexy Middleton LANCETS FINE MISC TEST twice a day 5/9/18   Historical Provider, MD   rosuvastatin (CRESTOR) 20 MG tablet daily at bedtime 3/20/18   Historical Provider, MD   sotalol (BETAPACE) 80 mg tablet 2 (two) times a day Takes  1/2 tab bid 5/15/19   Historical Provider, MD     I have reviewed home medications with patient personally  Allergies: No Known Allergies    Social History:     Marital Status: Single   Occupation:  Retired  Patient Pre-hospital Living Situation:  Lives with family  Patient Pre-hospital Level of Mobility:  Independent  Patient Pre-hospital Diet Restrictions:  Diabetic diet  Substance Use History:   Social History     Substance and Sexual Activity   Alcohol Use Yes    Comment: 1-2 beers once a week     Social History     Tobacco Use   Smoking Status Current Every Day Smoker    Types: Cigars    Start date: 1992    Last attempt to quit: 2019    Years since quittin 6   Smokeless Tobacco Never Used   Tobacco Comment    2 a day  was 6 a day for 6 months only     Social History     Substance and Sexual Activity   Drug Use No       Family History:    Family History   Problem Relation Age of Onset    Colon cancer Maternal Grandmother     Breast cancer Mother     Other Mother     Heart Valve Disease Father     Leukemia Father     Migraines Sister     Heart failure Family        Physical Exam:     Vitals:   Blood Pressure: 146/69 (03/15/21 2345)  Pulse: (!) 50 (21 0015)  Temperature: (!) 96 9 °F (36 1 °C) (03/15/21 2332)  Temp Source: Tympanic (03/15/21 2332)  Respirations: 16 (03/15/21 2332)  Weight - Scale: 111 kg (244 lb) (03/15/21 2238)  SpO2: 98 % (21 0015)    Physical Exam  Vitals signs and nursing note reviewed  Constitutional:       Appearance: He is well-developed  He is obese  HENT:      Head: Normocephalic and atraumatic  Neck:      Musculoskeletal: Neck supple  Thyroid: No thyromegaly  Vascular: No JVD  Trachea: No tracheal deviation  Cardiovascular:      Rate and Rhythm: Regular rhythm  Bradycardia present  Heart sounds: Normal heart sounds  No murmur  Pulmonary:      Effort: Pulmonary effort is normal  No respiratory distress  Breath sounds: Normal breath sounds  No wheezing or rales  Abdominal:      General: Bowel sounds are normal  There is no distension  Palpations: Abdomen is soft  Tenderness: There is no abdominal tenderness  There is no guarding  Comments: Abdomen is obese   Musculoskeletal:         General: Swelling present  Left lower leg: Edema present  Comments: Left lower leg edema, reddened, tender, scattered pimples on left lower leg  Redness tenderness extends to left inner thigh  Skin:     General: Skin is warm and dry  Neurological:      General: No focal deficit present  Mental Status: He is alert and oriented to person, place, and time  Psychiatric:         Mood and Affect: Mood normal          Judgment: Judgment normal          Additional Data:     Lab Results: I have personally reviewed pertinent reports  Results from last 7 days   Lab Units 03/16/21  0025   WBC Thousand/uL 13 37*   HEMOGLOBIN g/dL 16 2   HEMATOCRIT % 49 2   PLATELETS Thousands/uL 217   NEUTROS PCT % 61   LYMPHS PCT % 27   MONOS PCT % 10   EOS PCT % 2     Results from last 7 days   Lab Units 03/16/21  0025   POTASSIUM mmol/L 4 1   CHLORIDE mmol/L 99*   CO2 mmol/L 27   BUN mg/dL 23   CREATININE mg/dL 1 43*   CALCIUM mg/dL 9 0   ALK PHOS U/L 67   ALT U/L 36   AST U/L 17     Results from last 7 days   Lab Units 03/16/21  0025   INR  1 07       Imaging: I have personally reviewed pertinent reports  No results found  EKG, Pathology, and Other Studies Reviewed on Admission:   · EKG:  Marked Sinus Amanda Das, 42 in 1/2021    All[employer] Records Reviewed: Yes     ** Please Note: Dragon 360 Dictation voice to text software may have been used in the creation of this document   **

## 2021-03-16 NOTE — ED PROVIDER NOTES
History  Chief Complaint   Patient presents with    Leg Swelling     noted L lower leg swelling, redness and rash to area last night  pt diabetic, has not checked sugars     Pt in the ER with c/o LLE pain and swelling x 2 days  He denies trauma, and pain radiates from the calf into the thigh  Pt also with cellulitis to affected extremity  He denies fevers/chills  Pt has a hx of Hyperlipidemia/DM, and admits that he is not overly compliant with his diet  History provided by:  Patient   used: No        Prior to Admission Medications   Prescriptions Last Dose Informant Patient Reported? Taking?    Blood Glucose Monitoring Suppl (ONE TOUCH ULTRA MINI) w/Device KIT 3/15/2021 at Unknown time  Yes Yes   Empagliflozin (Jardiance) 25 MG TABS 3/15/2021 at Unknown time  Yes Yes   Sig: Take 25 mg by mouth daily at bedtime   ONE TOUCH ULTRA TEST test strip 3/15/2021 at Unknown time  Yes Yes   Sig: TEST twice a day   [address] 3/15/2021 at Unknown time  Yes Yes   Sig: TEST twice a day   aspirin 81 mg chewable tablet 3/15/2021 at Unknown time  Yes Yes   Sig: Chew 81 mg daily at bedtime    choline fenofibrate (TRILIPIX) 135 MG capsule 3/15/2021 at Unknown time Self Yes Yes   Sig: Take 135 mg by mouth daily at bedtime    losartan (COZAAR) 25 mg tablet 3/15/2021 at Unknown time  Yes Yes   Sig: Take 25 mg by mouth daily at bedtime    nitroglycerin (NITROSTAT) 0 4 mg SL tablet 3/15/2021 at Unknown time  Yes Yes   Sig: Place 0 4 mg under the tongue every 5 (five) minutes as needed for chest pain   psyllium (METAMUCIL) 58 6 % packet 3/15/2021 at Unknown time  Yes Yes   Sig: Take 1 packet by mouth daily   rosuvastatin (CRESTOR) 20 MG tablet 3/15/2021 at Unknown time  Yes Yes   Sig: daily at bedtime    sotalol (BETAPACE) 80 mg tablet 3/15/2021 at Unknown time  Yes Yes   Si (two) times a day Takes  1/2 tab bid      Facility-Administered Medications: None       Past Medical History: Diagnosis Date    CAD (coronary artery disease)     Colon polyp     Diabetes mellitus ([address] )     Hyperlipidemia     Sleep apnea        Past Surgical History:   Procedure Laterality Date    CARDIAC LOOP RECORDER      COLONOSCOPY      CORONARY STENT PLACEMENT N/A mar and oct 2017    ME XCAPSL CTRC RMVL INSJ IO LENS PROSTH W/O ECP Left 2020    Procedure: EXTRACTION EXTRACAPSULAR CATARACT PHACO INTRAOCULAR LENS (IOL); Surgeon: Cate Limon MD;  Location: [facility] MAIN OR;  Service: Ophthalmology       Family History   Problem Relation Age of Onset    Colon cancer Maternal Grandmother     Breast cancer Mother     Other Mother     Heart Valve Disease Father     Leukemia Father     Migraines Sister     Heart failure Family      I have reviewed and agree with the history as documented  E-Cigarette/Vaping    E-Cigarette Use Never User      E-Cigarette/Vaping Substances     Social History     Tobacco Use    Smoking status: Current Every Day Smoker     Types: Cigars     Start date: 1992     Last attempt to quit: 2019     Years since quittin 6    Smokeless tobacco: Never Used    Tobacco comment: 2 a day  was 6 a day for 6 months only   Substance Use Topics    Alcohol use: Yes     Comment: 1-2 beers once a week    Drug use: No       Review of Systems   Constitutional: Negative for chills and fever  Respiratory: Negative for cough, shortness of breath and wheezing  Cardiovascular: Negative for chest pain and palpitations  Gastrointestinal: Negative for abdominal pain, constipation, diarrhea, nausea and vomiting  Genitourinary: Negative for dysuria, flank pain, hematuria and urgency  Musculoskeletal: Positive for gait problem  Negative for back pain  Skin: Positive for color change  Negative for rash  All other systems reviewed and are negative  Physical Exam  Physical Exam  Vitals signs and nursing note reviewed     Constitutional:       Appearance: He is well-developed  HENT:      Head: Normocephalic and atraumatic  Eyes:      Pupils: Pupils are equal, round, and reactive to light  Cardiovascular:      Rate and Rhythm: Normal rate and regular rhythm  Heart sounds: Normal heart sounds  Pulmonary:      Effort: Pulmonary effort is normal       Breath sounds: Normal breath sounds  Abdominal:      General: Bowel sounds are normal  There is no distension  Palpations: Abdomen is soft  There is no mass  Tenderness: There is no abdominal tenderness  There is no guarding or rebound  Musculoskeletal:         General: No swelling or signs of injury  Left lower leg: He exhibits tenderness  He exhibits no bony tenderness, no swelling, no deformity and no laceration  No edema  Legs:    Skin:     General: Skin is warm and dry  Capillary Refill: Capillary refill takes less than 2 seconds  Neurological:      General: No focal deficit present  Mental Status: He is alert and oriented to person, place, and time  Psychiatric:         Behavior: Behavior normal          Thought Content:  Thought content normal          Judgment: Judgment normal          Vital Signs  ED Triage Vitals [03/15/21 2238]   Temperature Pulse Respirations Blood Pressure SpO2   97 5 °F (36 4 °C) 61 16 (!) 171/80 97 %      Temp Source Heart Rate Source Patient Position - Orthostatic VS BP Location FiO2 (%)   Tympanic Monitor Sitting Right arm --      Pain Score       2           Vitals:    03/15/21 2332 03/15/21 2345 03/16/21 0015 03/16/21 0244   BP: 146/69 146/69  157/68   Pulse: 56  (!) 50 (!) 48   Patient Position - Orthostatic VS: Sitting   Lying         Visual Acuity      ED Medications  Medications   aspirin chewable tablet 81 mg (81 mg Oral Given 3/16/21 0355)   fenofibrate (TRICOR) tablet 145 mg (145 mg Oral Given 3/16/21 0355)   Empagliflozin TABS 25 mg (25 mg Oral Not Given 3/16/21 0344)   losartan (COZAAR) tablet 25 mg (25 mg Oral Given 3/16/21 0355) psyllium (METAMUCIL) 1 packet (has no administration in time range)   atorvastatin (LIPITOR) tablet 40 mg (40 mg Oral Given 3/16/21 0354)   sotalol (BETAPACE) tablet 40 mg (40 mg Oral Not Given 3/16/21 0357)   acetaminophen (TYLENOL) tablet 650 mg (has no administration in time range)   ondansetron (ZOFRAN) injection 4 mg (has no administration in time range)   nicotine (NICODERM CQ) 7 mg/24hr TD 24 hr patch 1 patch (has no administration in time range)   ceFAZolin (ANCEF) IVPB (premix in dextrose) 2,000 mg 50 mL (2,000 mg Intravenous New Bag 3/16/21 0523)   saccharomyces boulardii (FLORASTOR) capsule 250 mg (has no administration in time range)   insulin lispro (HumaLOG) 100 units/mL subcutaneous injection 1-5 Units (has no administration in time range)   insulin lispro (HumaLOG) 100 units/mL subcutaneous injection 1-5 Units (has no administration in time range)   influenza vaccine, recombinant, quadrivalent (FLUBLOK) IM injection 0 5 mL (has no administration in time range)   clindamycin (CLEOCIN) IVPB (premix in dextrose) 600 mg 50 mL (0 mg Intravenous Stopped 3/16/21 0110)   enoxaparin (LOVENOX) subcutaneous injection 105 mg (105 mg Subcutaneous Given 3/16/21 0137)       Diagnostic Studies  Results Reviewed     Procedure Component Value Units Date/Time    Comprehensive metabolic panel [[id number]]  (Abnormal) Collected: 03/16/21 0025    Lab Status: Final result Specimen: Blood from Arm, Right Updated: 03/16/21 0049     Sodium 137 mmol/L      Potassium 4 1 mmol/L      Chloride 99 mmol/L      CO2 27 mmol/L      ANION GAP 11 mmol/L      BUN 23 mg/dL      Creatinine 1 43 mg/dL      Glucose 138 mg/dL      Calcium 9 0 mg/dL      AST 17 U/L      ALT 36 U/L      Alkaline Phosphatase 67 U/L      Total Protein 7 7 g/dL      Albumin 3 7 g/dL      Total Bilirubin 0 50 mg/dL      eGFR 52 ml/min/1 73sq m     Narrative:      [place] guidelines for Chronic Kidney Disease (CKD):     Stage 1 with normal or high GFR (GFR > 90 mL/min/1 73 square meters)    Stage 2 Mild CKD (GFR = 60-89 mL/min/1 73 square meters)    Stage 3A Moderate CKD (GFR = 45-59 mL/min/1 73 square meters)    Stage 3B Moderate CKD (GFR = 30-44 mL/min/1 73 square meters)    Stage 4 Severe CKD (GFR = 15-29 mL/min/1 73 square meters)    Stage 5 End Stage CKD (GFR <15 mL/min/1 73 square meters)  Note: GFR calculation is accurate only with a steady state creatinine    Protime-INR [[id number]]  (Normal) Collected: 03/16/21 0025    Lab Status: Final result Specimen: Blood from Arm, Right Updated: 03/16/21 0044     Protime 13 8 seconds      INR 1 07    APTT [[id number]]  (Normal) Collected: 03/16/21 0025    Lab Status: Final result Specimen: Blood from Arm, Right Updated: 03/16/21 0044     PTT 30 seconds     CBC and differential [[id number]]  (Abnormal) Collected: 03/16/21 0025    Lab Status: Final result Specimen: Blood from Arm, Right Updated: 03/16/21 0032     WBC 13 37 Thousand/uL      RBC 5 54 Million/uL      Hemoglobin 16 2 g/dL      Hematocrit 49 2 %      MCV 89 fL      MCH 29 2 pg      MCHC 32 9 g/dL      RDW 12 0 %      MPV 11 0 fL      Platelets 068 Thousands/uL      nRBC 0 /100 WBCs      Neutrophils Relative 61 %      Immat GRANS % 0 %      Lymphocytes Relative 27 %      Monocytes Relative 10 %      Eosinophils Relative 2 %      Basophils Relative 0 %      Neutrophils Absolute 8 17 Thousands/µL      Immature Grans Absolute 0 04 Thousand/uL      Lymphocytes Absolute 3 55 Thousands/µL      Monocytes Absolute 1 30 Thousand/µL      Eosinophils Absolute 0 26 Thousand/µL      Basophils Absolute 0 05 Thousands/µL     Blood culture #2 [[id number]] Collected: 03/16/21 0025    Lab Status: In process Specimen: Blood from Arm, Left Updated: 03/16/21 0031    Blood culture #1 [[id number]] Collected: 03/16/21 0025    Lab Status:  In process Specimen: Blood from Arm, Right Updated: 03/16/21 0031                 VAS lower limb venous duplex study, complete bilateral    (Results Pending)              Procedures  Procedures         ED Course                                           MDM  Number of Diagnoses or Management Options  Cellulitis of skin with lymphangitis: new and requires workup  Leg pain: new and requires workup  Diagnosis management comments: Patient is a diabetic with complaint of right lower extremity pain and swelling  Cellulitis with lymphangitis is noted on my exam   Patient also with a leukocytosis, and empirically started on clindamycin  Unable to obtain vascular study of affected lower extremity at this time  Will given an empiric dose of Lovenox  Patient will be admitted to the hospital is for more doses of IV antibiotics  Vascular study will be obtained during admission  Amount and/or Complexity of Data Reviewed  Clinical lab tests: ordered and reviewed    Risk of Complications, Morbidity, and/or Mortality  Presenting problems: high  Diagnostic procedures: high  Management options: high    Patient Progress  Patient progress: stable      Disposition  Final diagnoses:   Cellulitis of skin with lymphangitis   Leg pain     Time reflects when diagnosis was documented in both MDM as applicable and the Disposition within this note     Time User Action Codes Description Comment    3/16/2021  1:00 AM Marilin HOROWITZ Add [L03 90] Cellulitis of skin with lymphangitis     3/16/2021  1:02 AM Marilin Pollard [M79 606] Leg pain       ED Disposition     ED Disposition Condition Date/Time Comment    Admit Stable Tue Mar 16, 2021  1:00 AM Case was discussed with Jess Reyes NP and the patient's admission status was agreed to be Admission Status: observation status to the service of Dr Ish Cantor           Follow-up Information    None         Current Discharge Medication List      CONTINUE these medications which have NOT CHANGED    Details   aspirin 81 mg chewable tablet Chew 81 mg daily at bedtime       Blood Glucose Monitoring Suppl (ONE TOUCH ULTRA MINI) w/Device KIT Refills: 0      choline fenofibrate (TRILIPIX) 135 MG capsule Take 135 mg by mouth daily at bedtime       Empagliflozin (Jardiance) 25 MG TABS Take 25 mg by mouth daily at bedtime      losartan (COZAAR) 25 mg tablet Take 25 mg by mouth daily at bedtime       nitroglycerin (NITROSTAT) 0 4 mg SL tablet Place 0 4 mg under the tongue every 5 (five) minutes as needed for chest pain      ONE TOUCH ULTRA TEST test strip TEST twice a day  Refills: 0      ONETOUCH DELICA LANCETS FINE MISC TEST twice a day  Refills: 0      psyllium (METAMUCIL) 58 6 % packet Take 1 packet by mouth daily      rosuvastatin (CRESTOR) 20 MG tablet daily at bedtime       sotalol (BETAPACE) 80 mg tablet 2 (two) times a day Takes  1/2 tab bid           No discharge procedures on file      PDMP Review     None          ED Provider  Electronically Signed by           Johnny Turner DO  03/16/21 8524

## 2021-03-16 NOTE — ASSESSMENT & PLAN NOTE
Status post PCI with stents x2 in March and October 2017  Continue aspirin, sotalol, statin  Patient follows Dr Niya Hernandez as outpatient

## 2021-03-17 ENCOUNTER — APPOINTMENT (OUTPATIENT)
Dept: RADIOLOGY | Facility: HOSPITAL | Age: 61
End: 2021-03-17
Payer: COMMERCIAL

## 2021-03-17 LAB
ANION GAP SERPL CALCULATED.3IONS-SCNC: 6 MMOL/L (ref 4–13)
BASOPHILS # BLD AUTO: 0.06 THOUSANDS/ΜL (ref 0–0.1)
BASOPHILS NFR BLD AUTO: 1 % (ref 0–1)
BUN SERPL-MCNC: 24 MG/DL (ref 5–25)
CALCIUM SERPL-MCNC: 9 MG/DL (ref 8.3–10.1)
CHLORIDE SERPL-SCNC: 102 MMOL/L (ref 100–108)
CO2 SERPL-SCNC: 27 MMOL/L (ref 21–32)
CREAT SERPL-MCNC: 1.32 MG/DL (ref 0.6–1.3)
EOSINOPHIL # BLD AUTO: 0.33 THOUSAND/ΜL (ref 0–0.61)
EOSINOPHIL NFR BLD AUTO: 4 % (ref 0–6)
ERYTHROCYTE [DISTWIDTH] IN BLOOD BY AUTOMATED COUNT: 12.3 % (ref 11.6–15.1)
GFR SERPL CREATININE-BSD FRML MDRD: 57 ML/MIN/1.73SQ M
GLUCOSE P FAST SERPL-MCNC: 125 MG/DL (ref 65–99)
GLUCOSE SERPL-MCNC: 106 MG/DL (ref 65–140)
GLUCOSE SERPL-MCNC: 119 MG/DL (ref 65–140)
GLUCOSE SERPL-MCNC: 125 MG/DL (ref 65–140)
GLUCOSE SERPL-MCNC: 142 MG/DL (ref 65–140)
GLUCOSE SERPL-MCNC: 163 MG/DL (ref 65–140)
GLUCOSE SERPL-MCNC: 207 MG/DL (ref 65–140)
HCT VFR BLD AUTO: 48.5 % (ref 36.5–49.3)
HGB BLD-MCNC: 15.5 G/DL (ref 12–17)
IMM GRANULOCYTES # BLD AUTO: 0.03 THOUSAND/UL (ref 0–0.2)
IMM GRANULOCYTES NFR BLD AUTO: 0 % (ref 0–2)
LYMPHOCYTES # BLD AUTO: 2.72 THOUSANDS/ΜL (ref 0.6–4.47)
LYMPHOCYTES NFR BLD AUTO: 29 % (ref 14–44)
MCH RBC QN AUTO: 29.2 PG (ref 26.8–34.3)
MCHC RBC AUTO-ENTMCNC: 32 G/DL (ref 31.4–37.4)
MCV RBC AUTO: 92 FL (ref 82–98)
MONOCYTES # BLD AUTO: 0.85 THOUSAND/ΜL (ref 0.17–1.22)
MONOCYTES NFR BLD AUTO: 9 % (ref 4–12)
NEUTROPHILS # BLD AUTO: 5.27 THOUSANDS/ΜL (ref 1.85–7.62)
NEUTS SEG NFR BLD AUTO: 57 % (ref 43–75)
NRBC BLD AUTO-RTO: 0 /100 WBCS
PLATELET # BLD AUTO: 214 THOUSANDS/UL (ref 149–390)
PMV BLD AUTO: 11.2 FL (ref 8.9–12.7)
POTASSIUM SERPL-SCNC: 4 MMOL/L (ref 3.5–5.3)
PROCALCITONIN SERPL-MCNC: 0.05 NG/ML
RBC # BLD AUTO: 5.3 MILLION/UL (ref 3.88–5.62)
SODIUM SERPL-SCNC: 135 MMOL/L (ref 136–145)
WBC # BLD AUTO: 9.26 THOUSAND/UL (ref 4.31–10.16)

## 2021-03-17 PROCEDURE — 85025 COMPLETE CBC W/AUTO DIFF WBC: CPT | Performed by: INTERNAL MEDICINE

## 2021-03-17 PROCEDURE — 80048 BASIC METABOLIC PNL TOTAL CA: CPT | Performed by: INTERNAL MEDICINE

## 2021-03-17 PROCEDURE — 99214 OFFICE O/P EST MOD 30 MIN: CPT | Performed by: INTERNAL MEDICINE

## 2021-03-17 PROCEDURE — 93970 EXTREMITY STUDY: CPT

## 2021-03-17 PROCEDURE — 82948 REAGENT STRIP/BLOOD GLUCOSE: CPT

## 2021-03-17 PROCEDURE — 93970 EXTREMITY STUDY: CPT | Performed by: SURGERY

## 2021-03-17 PROCEDURE — 84145 PROCALCITONIN (PCT): CPT | Performed by: NURSE PRACTITIONER

## 2021-03-17 PROCEDURE — 99225 PR SBSQ OBSERVATION CARE/DAY 25 MINUTES: CPT | Performed by: INTERNAL MEDICINE

## 2021-03-17 RX ADMIN — LOSARTAN POTASSIUM 25 MG: 25 TABLET, FILM COATED ORAL at 21:17

## 2021-03-17 RX ADMIN — ASPIRIN 81 MG CHEWABLE TABLET 81 MG: 81 TABLET CHEWABLE at 21:17

## 2021-03-17 RX ADMIN — Medication 250 MG: at 09:07

## 2021-03-17 RX ADMIN — Medication 250 MG: at 17:34

## 2021-03-17 RX ADMIN — FENOFIBRATE 145 MG: 145 TABLET ORAL at 21:18

## 2021-03-17 RX ADMIN — CEFAZOLIN SODIUM 2000 MG: 2 SOLUTION INTRAVENOUS at 13:47

## 2021-03-17 RX ADMIN — CEFAZOLIN SODIUM 2000 MG: 2 SOLUTION INTRAVENOUS at 21:18

## 2021-03-17 RX ADMIN — INSULIN LISPRO 1 UNITS: 100 INJECTION, SOLUTION INTRAVENOUS; SUBCUTANEOUS at 12:05

## 2021-03-17 RX ADMIN — ATORVASTATIN CALCIUM 40 MG: 40 TABLET, FILM COATED ORAL at 21:18

## 2021-03-17 RX ADMIN — ACETAMINOPHEN 650 MG: 325 TABLET, FILM COATED ORAL at 20:46

## 2021-03-17 RX ADMIN — SOTALOL HYDROCHLORIDE 40 MG: 80 TABLET ORAL at 09:07

## 2021-03-17 RX ADMIN — SOTALOL HYDROCHLORIDE 40 MG: 80 TABLET ORAL at 21:18

## 2021-03-17 RX ADMIN — CEFAZOLIN SODIUM 2000 MG: 2 SOLUTION INTRAVENOUS at 05:05

## 2021-03-17 RX ADMIN — PSYLLIUM HUSK 1 PACKET: 3.4 POWDER ORAL at 09:08

## 2021-03-17 NOTE — ASSESSMENT & PLAN NOTE
Implant 3 years ago  Denies diagnosis of atrial fibrillation or atrial flutter  Patient is on sotalol  Patient currently is bradycardic with heart rate in 40-50 range but asymptomatic  Follow-up primary cardiologist post discharge

## 2021-03-17 NOTE — ASSESSMENT & PLAN NOTE
Status post PCI with stents x2 in March and October 2017  Continue aspirin, sotalol, statin  Patient follows Dr Martínez Poster as outpatient  Patient did have any recent nuclear stress test which was normal

## 2021-03-17 NOTE — PLAN OF CARE
Problem: PAIN - ADULT  Goal: Verbalizes/displays adequate comfort level or baseline comfort level  Description: Interventions:  - Encourage patient to monitor pain and request assistance  - Assess pain using appropriate pain scale  - Administer analgesics based on type and severity of pain and evaluate response  - Implement non-pharmacological measures as appropriate and evaluate response  - Consider cultural and social influences on pain and pain management  - Notify physician/advanced practitioner if interventions unsuccessful or patient reports new pain  Outcome: Progressing     Problem: INFECTION - ADULT  Goal: Absence or prevention of progression during hospitalization  Description: INTERVENTIONS:  - Assess and monitor for signs and symptoms of infection  - Monitor lab/diagnostic results  - Monitor all insertion sites, i e  indwelling lines, tubes, and drains  - Monitor endotracheal if appropriate and nasal secretions for changes in amount and color  - Ida appropriate cooling/warming therapies per order  - Administer medications as ordered  - Instruct and encourage patient and family to use good hand hygiene technique  - Identify and instruct in appropriate isolation precautions for identified infection/condition  Outcome: Progressing  Goal: Absence of fever/infection during neutropenic period  Description: INTERVENTIONS:  - Monitor WBC    Outcome: Progressing     Problem: SAFETY ADULT  Goal: Patient will remain free of falls  Description: INTERVENTIONS:  - Assess patient frequently for physical needs  -  Identify cognitive and physical deficits and behaviors that affect risk of falls    -  Ida fall precautions as indicated by assessment   - Educate patient/family on patient safety including physical limitations  - Instruct patient to call for assistance with activity based on assessment  - Modify environment to reduce risk of injury  - Consider OT/PT consult to assist with strengthening/mobility  Outcome: Progressing  Goal: Maintain or return to baseline ADL function  Description: INTERVENTIONS:  -  Assess patient's ability to carry out ADLs; assess patient's baseline for ADL function and identify physical deficits which impact ability to perform ADLs (bathing, care of mouth/teeth, toileting, grooming, dressing, etc )  - Assess/evaluate cause of self-care deficits   - Assess range of motion  - Assess patient's mobility; develop plan if impaired  - Assess patient's need for assistive devices and provide as appropriate  - Encourage maximum independence but intervene and supervise when necessary  - Involve family in performance of ADLs  - Assess for home care needs following discharge   - Consider OT consult to assist with ADL evaluation and planning for discharge  - Provide patient education as appropriate  Outcome: Progressing  Goal: Maintain or return mobility status to optimal level  Description: INTERVENTIONS:  - Assess patient's baseline mobility status (ambulation, transfers, stairs, etc )    - Identify cognitive and physical deficits and behaviors that affect mobility  - Identify mobility aids required to assist with transfers and/or ambulation (gait belt, sit-to-stand, lift, walker, cane, etc )  - [place] fall precautions as indicated by assessment  - Record patient progress and toleration of activity level on Mobility SBAR; progress patient to next Phase/Stage  - Instruct patient to call for assistance with activity based on assessment  - Consider rehabilitation consult to assist with strengthening/weightbearing, etc   Outcome: Progressing     Problem: DISCHARGE PLANNING  Goal: Discharge to home or other facility with appropriate resources  Description: INTERVENTIONS:  - Identify barriers to discharge w/patient and caregiver  - Arrange for needed discharge resources and transportation as appropriate  - Identify discharge learning needs (meds, wound care, etc )  - Arrange for interpretive services to assist at discharge as needed  - Refer to Case Management Department for coordinating discharge planning if the patient needs post-hospital services based on physician/advanced practitioner order or complex needs related to functional status, cognitive ability, or social support system  Outcome: Progressing     Problem: Knowledge Deficit  Goal: Patient/family/caregiver demonstrates understanding of disease process, treatment plan, medications, and discharge instructions  Description: Complete learning assessment and assess knowledge base    Interventions:  - Provide teaching at level of understanding  - Provide teaching via preferred learning methods  Outcome: Progressing

## 2021-03-17 NOTE — PROGRESS NOTES
Progress Note - Infectious Disease   Houston Bird 56 y o  male MRN: [id number]  Unit/Bed#: [address] [place] [address] Encounter: [id number]    Impression/Recommendations:  · Cellulitis of distal left lower extremity with lymphangitis streaking to left medial thigh  Suspect streptococcal infection  Infection began after prolonged standing on his feet while fundraising on Sunday  Patient says he stepped on a sharp object around 1-2 weeks ago  Cellulitis is improving with cefazolin iv  ? Left leg elevation advised  ? Follow official read of x-ray left foot to exclude the presence of foreign body  ? Continue cefazolin 2 g IV q 8 hours  Will consider transition to oral ABx therapy tomorrow with continued improvement  ? Monitor clinical response, temperature curve    · Leukocytosis:  Likely due to cellulitis  WBC count has normalized    · Diabetes mellitus type 2 with mild hyperglycemia  Adequate outpatient glycemic control with A1c of 7 2 on 03/16/21  This is a risk factor for infection and delayed wound healing  ? Glycemic control per primary team     · Morbid obesity with BMI of 37 5     My recommendations were discussed with the patient in detail who verbalized understanding  His questions were answered by me  Thank you for allowing me to participate in the care of this patient  The ID service will follow      Antibiotics: cefazolin iv (3/16)  Scheduled Meds:  Current Facility-Administered Medications   Medication Dose Route Frequency Provider Last Rate    acetaminophen  650 mg Oral Q6H PRN MARQUIS Montgomery      aspirin  81 mg Oral HS Cuimoy Cuellar, JUDYNP      atorvastatin  40 mg Oral HS Cuiyin Alisa, CRNP      cefazolin  2,000 mg Intravenous Q8H MARQUIS Montgomery 2,000 mg (03/17/21 1347)    Empagliflozin  25 mg Oral HS [place] MARQUIS Cuellar      fenofibrate  145 mg Oral HS Cuiyin Leesarihenrique, MARQUIS      insulin lispro  1-5 Units Subcutaneous TID AC MARQUIS Montgomery      insulin lispro  1-5 Units Subcutaneous HS Lex Cuellar, MARQUIS      losartan  25 mg Oral HS Cuimoy Landersrik, CRNP      nicotine  1 patch Transdermal Daily Cuimoy Landersrihenrique, MARQUIS      ondansetron  4 mg Intravenous Q6H PRN Cuimoy Landersrik, MARQUIS      psyllium  1 packet Oral Daily Cuimoy Landersrik, MARQUIS      saccharomyces boulardii  250 mg Oral BID Cuimoy Landersrihenrique, MARQUIS      sotalol  40 mg Oral Q12H [address] Cuimoy LandersriMARQUIS duenas       Continuous Infusions:   PRN Meds:   acetaminophen    ondansetron    Subjective:  Patient says his LT leg redness and swelling are improving  His thigh is less tender to touch  He is able to walk and bear weight on his left leg  He denies fever, chills, nausea, vomiting, diarrhea, shortness of breath, rash  He is tolerating cefazolin iv  Objective:  Vitals:  Temp:  [97 °F (36 1 °C)-98 8 °F (37 1 °C)] 97 8 °F (36 6 °C)  HR:  [44-50] 44  Resp:  [16-18] 16  BP: (114-141)/(57-69) 141/69  SpO2:  [95 %-96 %] 95 %  Temp (24hrs), Av 9 °F (36 6 °C), Min:97 °F (36 1 °C), Max:98 8 °F (37 1 °C)  Current: Temperature: 97 8 °F (36 6 °C)  Physical Exam:   General Appearance:  Alert, awake, nontoxic, no acute distress   ENT: Oropharynx moist without lesions   Lungs:   Clear to auscultation bilaterally; respirations unlabored   Heart:  S1, S2, RRR, no murmur   Abdomen:   Soft, non-tender, non-distended   : No Ott catheter present   Extremities: Left distal leg edema is improving   Neurologic: AAO to person, surroundings, conversant, fluent speech   Skin: Distal left leg erythema is receding with lymphangitic streaking left medial thigh which is non-tender to palpation  No drainage noted  Dry skin of b/l legs and feet noted       Labs, Imaging, & Other studies:   All pertinent labs and imaging studies were personally reviewed  Results from last 7 days   Lab Units 21  0505 21  0602 21  0025   WBC Thousand/uL 9 26 11 30* 13 37*   HEMOGLOBIN g/dL 15 5 15 7 16 2   PLATELETS Thousands/uL 214 189 217     Results from last 7 days   Lab Units 03/17/21  0505 03/16/21  0602 03/16/21  0025   SODIUM mmol/L 135* 136 137   POTASSIUM mmol/L 4 0 3 8 4 1   CHLORIDE mmol/L 102 100 99*   CO2 mmol/L 27 27 27   BUN mg/dL 24 23 23   CREATININE mg/dL 1 32* 1 57* 1 43*   EGFR ml/min/1 73sq m 57 47 52   CALCIUM mg/dL 9 0 8 5 9 0   AST U/L  --   --  17   ALT U/L  --   --  36   ALK PHOS U/L  --   --  67     Results from last 7 days   Lab Units 03/16/21  0025   BLOOD CULTURE  No Growth at 24 hrs  No Growth at 24 hrs  Results from last 7 days   Lab Units 03/17/21  0505 03/16/21  0602   PROCALCITONIN ng/ml 0 05 0 06     3/16 XR LT foot: no foreign body evident on my read  Image personally reviewed

## 2021-03-17 NOTE — ASSESSMENT & PLAN NOTE
Lab Results   Component Value Date    EGFR 57 03/17/2021    EGFR 47 03/16/2021    EGFR 52 03/16/2021    CREATININE 1 32 (H) 03/17/2021    CREATININE 1 57 (H) 03/16/2021    CREATININE 1 43 (H) 03/16/2021   Baseline creatinine around 1 4's  Creatinine stable  Monitor

## 2021-03-17 NOTE — ASSESSMENT & PLAN NOTE
Patient presented with left lower leg redness, edema, pain, with extension to left inner thigh since Sunday night  Precipitated by standing on his feet for several hours on Sunday for fundraising  Denies history of lower extremity cellulitis or bad infections  Denies history of PE/DVT or family history of DVT PE  WBC 13 37 upon admission  Patient not have cellulitis with associated lymphangitis  Patient received IV clindamycin and therapeutic Lovenox in ED   Patient was on IV Ancef with good clinical response  Lower extremity venous Doppler showed no evidence of DVT  Id was consulted appreciate recommendations  Possible transition to p o  Keflex patient continues to improve  Blood cultures negative at 24 hours  Left foot x-ray was ordered as patient reported stepping on a foreign body with possible pacing wound about 2 weeks ago-results are pending

## 2021-03-17 NOTE — PROGRESS NOTES
Estrellita 43  Progress Note [facility] 1960, 56 y o  male MRN: [id number]  Unit/Bed#: [address] [place] [address] Encounter: [id number]  Primary Care Provider: Dilan Lebron MD   Date and time admitted to hospital: 3/15/2021 11:25 PM    * Cellulitis of left lower extremity  Assessment & Plan  Patient presented with left lower leg redness, edema, pain, with extension to left inner thigh since Sunday night  Precipitated by standing on his feet for several hours on Sunday for fundraising  Denies history of lower extremity cellulitis or bad infections  Denies history of PE/DVT or family history of DVT PE  WBC 13 37 upon admission  Patient not have cellulitis with associated lymphangitis  Patient received IV clindamycin and therapeutic Lovenox in ED   Patient was on IV Ancef with good clinical response  Lower extremity venous Doppler showed no evidence of DVT  Id was consulted appreciate recommendations  Possible transition to p o  Keflex patient continues to improve  Blood cultures negative at 24 hours  Left foot x-ray was ordered as patient reported stepping on a foreign body with possible pacing wound about 2 weeks ago-results are pending  Type 2 diabetes mellitus [facility])  Assessment & Plan  Lab Results   Component Value Date    HGBA1C 7 2 (H) 03/16/2021       Recent Labs     03/16/21  2157 03/17/21  0721 03/17/21  1136 03/17/21  1618   POCGLU 207* 142* 163* 106       Blood Sugar Average: Last 72 hrs:  (P) 148 5 check A1c  Patient is on Jardiance at home  Continue  Continue Humalog sliding scale with diabetic diet      CAD (coronary artery disease)  Assessment & Plan  Status post PCI with stents x2 in March and October 2017  Continue aspirin, sotalol, statin  Patient follows Dr Sonja Ngo as outpatient  Patient did have any recent nuclear stress test which was normal    Status post placement of implantable loop recorder  Assessment & Plan  Implant 3 years ago    Denies diagnosis of atrial fibrillation or atrial flutter  Patient is on sotalol  Patient currently is bradycardic with heart rate in 40-50 range but asymptomatic  Follow-up primary cardiologist post discharge  Hypertension  Assessment & Plan  Patient is on losartan, sotalol at home  Blood pressure is stable currently  CKD (chronic kidney disease)  Assessment & Plan  Lab Results   Component Value Date    EGFR 57 2021    EGFR 47 2021    EGFR 52 2021    CREATININE 1 32 (H) 2021    CREATININE 1 57 (H) 2021    CREATININE 1 43 (H) 2021   Baseline creatinine around 1 4's  Creatinine stable  Monitor    Obesity  Assessment & Plan  Body mass index is 37 5 kg/m²  Diet and lifestyle modification    Hyperlipidemia  Assessment & Plan  Continue Trilipix and statin  MOUNIKA (obstructive sleep apnea)  Assessment & Plan  Intolerant to mask during sleep study  Advised patient to follow-up with PCP post discharge  Mild intermittent asthma without complication  Assessment & Plan  Noted history  No signs of exacerbation      VTE Pharmacologic Prophylaxis:   Pharmacologic: Enoxaparin (Lovenox)  Mechanical VTE Prophylaxis in Place: No    Patient Centered Rounds: I have performed bedside rounds with nursing staff today  Discussions with Specialists or Other Care Team Provider: Dr Olson Master    Education and Discussions with Family / Patient: yes    Time Spent for Care: 30 minutes  More than 50% of total time spent on counseling and coordination of care as described above  Current Length of Stay: 0 day(s)    Current Patient Status: Observation   Certification Statement: The patient will continue to require additional inpatient hospital stay due to Left leg cellulitis    Discharge Plan:  Home    Code Status: Level 1 - Full Code      Subjective:   Patient is feeling much better  Improved swelling redness and pain in the left leg      Objective:     Vitals:   Temp (24hrs), Av 9 °F (36 6 °C), Min:97 °F (36 1 °C), Max:98 8 °F (37 1 °C)    Temp:  [97 °F (36 1 °C)-98 8 °F (37 1 °C)] 97 9 °F (36 6 °C)  HR:  [44-49] 44  Resp:  [16-18] 16  BP: (114-141)/(57-69) 132/65  SpO2:  [95 %-97 %] 97 %  Body mass index is 37 5 kg/m²  Input and Output Summary (last 24 hours):     No intake or output data in the 24 hours ending 03/17/21 1921    Physical Exam:     Physical Exam  Constitutional:       General: He is not in acute distress  HENT:      Head: Normocephalic and atraumatic  Eyes:      Conjunctiva/sclera: Conjunctivae normal       Pupils: Pupils are equal, round, and reactive to light  Neck:      Musculoskeletal: Normal range of motion and neck supple  Cardiovascular:      Rate and Rhythm: Normal rate and regular rhythm  Heart sounds: Normal heart sounds  Pulmonary:      Effort: Pulmonary effort is normal  No respiratory distress  Breath sounds: No wheezing, rhonchi or rales  Chest:      Chest wall: No tenderness  Abdominal:      General: Bowel sounds are normal  There is no distension  Palpations: Abdomen is soft  Tenderness: There is no abdominal tenderness  There is no guarding or rebound  Musculoskeletal:      Right lower leg: Edema present  Left lower leg: Edema present  Skin:     General: Skin is warm and dry  Findings: No rash  Comments: Minimal redness of the left leg with some minimal streaking on the medial aspect of the left thigh   Neurological:      Mental Status: He is alert  Cranial Nerves: No cranial nerve deficit           Additional Data:     Labs:    Results from last 7 days   Lab Units 03/17/21  0505   WBC Thousand/uL 9 26   HEMOGLOBIN g/dL 15 5   HEMATOCRIT % 48 5   PLATELETS Thousands/uL 214   NEUTROS PCT % 57   LYMPHS PCT % 29   MONOS PCT % 9   EOS PCT % 4     Results from last 7 days   Lab Units 03/17/21  0505  03/16/21  0025   POTASSIUM mmol/L 4 0   < > 4 1   CHLORIDE mmol/L 102   < > 99*   CO2 mmol/L 27   < > 27   BUN mg/dL 24   < > 23 CREATININE mg/dL 1 32*   < > 1 43*   CALCIUM mg/dL 9 0   < > 9 0   ALK PHOS U/L  --   --  67   ALT U/L  --   --  36   AST U/L  --   --  17    < > = values in this interval not displayed  Results from last 7 days   Lab Units 03/16/21  0025   INR  1 07       * I Have Reviewed All Lab Data Listed Above  * Additional Pertinent Lab Tests Reviewed: Tim 64 Admission Reviewed        Recent Cultures (last 7 days):     Results from last 7 days   Lab Units 03/16/21  0025   BLOOD CULTURE  No Growth at 24 hrs  No Growth at 24 hrs  Last 24 Hours Medication List:   Current Facility-Administered Medications   Medication Dose Route Frequency Provider Last Rate    acetaminophen  650 mg Oral Q6H PRN Cuiyin Yurik, CRNP      aspirin  81 mg Oral HS Cuiyin Yurik, CRNP      atorvastatin  40 mg Oral HS Cuiyin Yurik, CRNP      cefazolin  2,000 mg Intravenous Q8H Cuiyin Yurik, CRNP 2,000 mg (03/17/21 1347)    Empagliflozin  25 mg Oral HS Cuiyin Yurik, CRNP      fenofibrate  145 mg Oral HS Cuiyin Yurik, CRNP      insulin lispro  1-5 Units Subcutaneous TID AC Cuiyin Yurik, CRNP      insulin lispro  1-5 Units Subcutaneous HS Cuiyin Yurik, CRNP      losartan  25 mg Oral HS Cuiyin Yurik, CRNP      nicotine  1 patch Transdermal Daily Cuiyin Yurik, CRNP      ondansetron  4 mg Intravenous Q6H PRN Cuiyin Yurik, CRNP      psyllium  1 packet Oral Daily Cuiyin Yurik, CRNP      saccharomyces boulardii  250 mg Oral BID Cuiyin Yurik, CRNP      sotalol  40 mg Oral Q12H [place] 45, CRNP          Today, Patient Was Seen By: Priscilla Matamoros MD    ** Please Note: Dictation voice to text software may have been used in the creation of this document   **

## 2021-03-17 NOTE — ASSESSMENT & PLAN NOTE
Lab Results   Component Value Date    HGBA1C 7 2 (H) 03/16/2021       Recent Labs     03/16/21  2157 03/17/21  0721 03/17/21  1136 03/17/21  1618   POCGLU 207* 142* 163* 106       Blood Sugar Average: Last 72 hrs:  (P) 148 5 check A1c  Patient is on Jardiance at home  Continue    Continue Humalog sliding scale with diabetic diet

## 2021-03-18 VITALS
RESPIRATION RATE: 18 BRPM | TEMPERATURE: 97.7 F | SYSTOLIC BLOOD PRESSURE: 128 MMHG | HEIGHT: 67 IN | HEART RATE: 42 BPM | BODY MASS INDEX: 37.58 KG/M2 | WEIGHT: 239.42 LBS | OXYGEN SATURATION: 96 % | DIASTOLIC BLOOD PRESSURE: 64 MMHG

## 2021-03-18 LAB
GLUCOSE SERPL-MCNC: 156 MG/DL (ref 65–140)
GLUCOSE SERPL-MCNC: 212 MG/DL (ref 65–140)

## 2021-03-18 PROCEDURE — 82948 REAGENT STRIP/BLOOD GLUCOSE: CPT

## 2021-03-18 PROCEDURE — 99217 PR OBSERVATION CARE DISCHARGE MANAGEMENT: CPT | Performed by: INTERNAL MEDICINE

## 2021-03-18 PROCEDURE — 99214 OFFICE O/P EST MOD 30 MIN: CPT | Performed by: INTERNAL MEDICINE

## 2021-03-18 RX ORDER — CEPHALEXIN 500 MG/1
500 CAPSULE ORAL EVERY 6 HOURS SCHEDULED
Qty: 20 CAPSULE | Refills: 0 | Status: SHIPPED | OUTPATIENT
Start: 2021-03-18 | End: 2021-03-23

## 2021-03-18 RX ORDER — CEPHALEXIN 500 MG/1
500 CAPSULE ORAL EVERY 6 HOURS SCHEDULED
Status: DISCONTINUED | OUTPATIENT
Start: 2021-03-18 | End: 2021-03-18 | Stop reason: HOSPADM

## 2021-03-18 RX ORDER — SACCHAROMYCES BOULARDII 250 MG
250 CAPSULE ORAL 2 TIMES DAILY
Qty: 14 CAPSULE | Refills: 0 | Status: SHIPPED | OUTPATIENT
Start: 2021-03-18 | End: 2021-03-25

## 2021-03-18 RX ORDER — CEPHALEXIN 500 MG/1
500 CAPSULE ORAL EVERY 6 HOURS SCHEDULED
Qty: 28 CAPSULE | Refills: 0 | Status: SHIPPED | OUTPATIENT
Start: 2021-03-18 | End: 2021-03-18

## 2021-03-18 RX ADMIN — PSYLLIUM HUSK 1 PACKET: 3.4 POWDER ORAL at 08:42

## 2021-03-18 RX ADMIN — INSULIN LISPRO 1 UNITS: 100 INJECTION, SOLUTION INTRAVENOUS; SUBCUTANEOUS at 11:52

## 2021-03-18 RX ADMIN — CEPHALEXIN 500 MG: 500 CAPSULE ORAL at 13:14

## 2021-03-18 RX ADMIN — INSULIN LISPRO 1 UNITS: 100 INJECTION, SOLUTION INTRAVENOUS; SUBCUTANEOUS at 08:42

## 2021-03-18 RX ADMIN — SOTALOL HYDROCHLORIDE 40 MG: 80 TABLET ORAL at 08:42

## 2021-03-18 RX ADMIN — CEFAZOLIN SODIUM 2000 MG: 2 SOLUTION INTRAVENOUS at 05:09

## 2021-03-18 RX ADMIN — Medication 250 MG: at 08:42

## 2021-03-18 NOTE — PLAN OF CARE
Problem: PAIN - ADULT  Goal: Verbalizes/displays adequate comfort level or baseline comfort level  Description: Interventions:  - Encourage patient to monitor pain and request assistance  - Assess pain using appropriate pain scale  - Administer analgesics based on type and severity of pain and evaluate response  - Implement non-pharmacological measures as appropriate and evaluate response  - Consider cultural and social influences on pain and pain management  - Notify physician/advanced practitioner if interventions unsuccessful or patient reports new pain  3/18/2021 1608 by Raji Senior RN  Outcome: Completed  3/18/2021 1608 by Raji Senior RN  Outcome: Progressing     Problem: INFECTION - ADULT  Goal: Absence or prevention of progression during hospitalization  Description: INTERVENTIONS:  - Assess and monitor for signs and symptoms of infection  - Monitor lab/diagnostic results  - Monitor all insertion sites, i e  indwelling lines, tubes, and drains  - Monitor endotracheal if appropriate and nasal secretions for changes in amount and color  - [place] appropriate cooling/warming therapies per order  - Administer medications as ordered  - Instruct and encourage patient and family to use good hand hygiene technique  - Identify and instruct in appropriate isolation precautions for identified infection/condition  3/18/2021 1608 by Raji eSnior RN  Outcome: Completed  3/18/2021 1608 by Raji Senior RN  Outcome: Progressing  Goal: Absence of fever/infection during neutropenic period  Description: INTERVENTIONS:  - Monitor WBC    3/18/2021 1608 by Raji Senior RN  Outcome: Completed  3/18/2021 1608 by Raji Senior RN  Outcome: Progressing     Problem: SAFETY ADULT  Goal: Patient will remain free of falls  Description: INTERVENTIONS:  - Assess patient frequently for physical needs  -  Identify cognitive and physical deficits and behaviors that affect risk of falls    -  [place] fall precautions as indicated by assessment   - Educate patient/family on patient safety including physical limitations  - Instruct patient to call for assistance with activity based on assessment  - Modify environment to reduce risk of injury  - Consider OT/PT consult to assist with strengthening/mobility  3/18/2021 1608 by Fer Gambino RN  Outcome: Completed  3/18/2021 1608 by Fer Gambino RN  Outcome: Progressing  Goal: Maintain or return to baseline ADL function  Description: INTERVENTIONS:  -  Assess patient's ability to carry out ADLs; assess patient's baseline for ADL function and identify physical deficits which impact ability to perform ADLs (bathing, care of mouth/teeth, toileting, grooming, dressing, etc )  - Assess/evaluate cause of self-care deficits   - Assess range of motion  - Assess patient's mobility; develop plan if impaired  - Assess patient's need for assistive devices and provide as appropriate  - Encourage maximum independence but intervene and supervise when necessary  - Involve family in performance of ADLs  - Assess for home care needs following discharge   - Consider OT consult to assist with ADL evaluation and planning for discharge  - Provide patient education as appropriate  3/18/2021 1608 by Fer Gambino RN  Outcome: Completed  3/18/2021 1608 by Fer Gambino RN  Outcome: Progressing  Goal: Maintain or return mobility status to optimal level  Description: INTERVENTIONS:  - Assess patient's baseline mobility status (ambulation, transfers, stairs, etc )    - Identify cognitive and physical deficits and behaviors that affect mobility  - Identify mobility aids required to assist with transfers and/or ambulation (gait belt, sit-to-stand, lift, walker, cane, etc )  - [place] fall precautions as indicated by assessment  - Record patient progress and toleration of activity level on Mobility SBAR; progress patient to next Phase/Stage  - Instruct patient to call for assistance with activity based on assessment  - Consider rehabilitation consult to assist with strengthening/weightbearing, etc   3/18/2021 1608 by Sol Closs, RN  Outcome: Completed  3/18/2021 1608 by Sol Closs, RN  Outcome: Progressing     Problem: DISCHARGE PLANNING  Goal: Discharge to home or other facility with appropriate resources  Description: INTERVENTIONS:  - Identify barriers to discharge w/patient and caregiver  - Arrange for needed discharge resources and transportation as appropriate  - Identify discharge learning needs (meds, wound care, etc )  - Arrange for interpretive services to assist at discharge as needed  - Refer to Case Management Department for coordinating discharge planning if the patient needs post-hospital services based on physician/advanced practitioner order or complex needs related to functional status, cognitive ability, or social support system  3/18/2021 1608 by Sol Closs, RN  Outcome: Completed  3/18/2021 1608 by Sol Closs, RN  Outcome: Progressing     Problem: Knowledge Deficit  Goal: Patient/family/caregiver demonstrates understanding of disease process, treatment plan, medications, and discharge instructions  Description: Complete learning assessment and assess knowledge base    Interventions:  - Provide teaching at level of understanding  - Provide teaching via preferred learning methods  3/18/2021 1608 by Sol Closs, RN  Outcome: Completed  3/18/2021 1608 by Sol Closs, RN  Outcome: Progressing

## 2021-03-18 NOTE — PROGRESS NOTES
Patient states visual disturbance in the L eye as well as numbness to PALLAVI and LL extremity  AP on call notified  VS obtained  Patient also states that this is a reoccurring issue and only requests tylenol  Patient states relief at this time  Will continue to monitor

## 2021-03-18 NOTE — DISCHARGE INSTRUCTIONS
Cellulitis   WHAT YOU NEED TO KNOW:   Cellulitis is a skin infection caused by bacteria  Cellulitis may go away on its own or you may need treatment  Your healthcare provider may draw a [place] around the outside edges of your cellulitis  If your cellulitis spreads, your healthcare provider will see it outside of the [place]  DISCHARGE INSTRUCTIONS:   Call 911 if:   · You have sudden trouble breathing or chest pain  Seek care immediately if:   · Your wound gets larger and more painful  · You feel a crackling under your skin when you touch it  · You have purple dots or bumps on your skin, or you see bleeding under your skin  · You have new swelling and pain in your legs  · The red, warm, swollen area gets larger  · You see red streaks coming from the infected area  Contact your healthcare provider if:   · You have a fever  · Your fever or pain does not go away or gets worse  · The area does not get smaller after 2 days of antibiotics  · Your skin is flaking or peeling off  · You have questions or concerns about your condition or care  Medicines:   · Antibiotics  help treat the bacterial infection  · NSAIDs , such as ibuprofen, help decrease swelling, pain, and fever  NSAIDs can cause stomach bleeding or kidney problems in certain people  If you take blood thinner medicine, always ask if NSAIDs are safe for you  Always read the medicine label and follow directions  Do not give these medicines to children under 10months of age without direction from your child's healthcare provider  · Acetaminophen  decreases pain and fever  It is available without a doctor's order  Ask how much to take and how often to take it  Follow directions  Read the labels of all other medicines you are using to see if they also contain acetaminophen, or ask your doctor or pharmacist  Acetaminophen can cause liver damage if not taken correctly   Do not use more than 4 grams (4,000 milligrams) total of acetaminophen in one day  · Take your medicine as directed  Contact your healthcare provider if you think your medicine is not helping or if you have side effects  Tell him or her if you are allergic to any medicine  Keep a list of the medicines, vitamins, and herbs you take  Include the amounts, and when and why you take them  Bring the list or the pill bottles to follow-up visits  Carry your medicine list with you in case of an emergency  Self-care:   · Elevate the area above the level of your heart  as often as you can  This will help decrease swelling and pain  Prop the area on pillows or blankets to keep it elevated comfortably  · Clean the area daily until the wound scabs over  Gently wash the area with soap and water  Pat dry  Use dressings as directed  · Place cool or warm, wet cloths on the area as directed  Use clean cloths and clean water  Leave it on the area until the cloth is room temperature  Pat the area dry with a clean, dry cloth  The cloths may help decrease pain  Prevent cellulitis:   · Do not scratch bug bites or areas of injury  You increase your risk for cellulitis by scratching these areas  · Do not share personal items, such as towels, clothing, and razors  · Clean exercise equipment  with germ-killing  before and after you use it  · Wash your hands often  Use soap and water  Wash your hands after you use the bathroom, change a child's diapers, or sneeze  Wash your hands before you prepare or eat food  Use lotion to prevent dry, cracked skin  · Wear pressure stockings as directed  You may be told to wear the stockings if you have peripheral edema  The stockings improve blood flow and decrease swelling  · Treat athlete's foot  This can help prevent the spread of a bacterial skin infection  Follow up with your healthcare provider within 3 days, or as directed:   Your healthcare provider will check if your cellulitis is getting better  You may need different medicine  Write down your questions so you remember to ask them during your visits  © Copyright [address] Information is for End User's use only and may not be sold, redistributed or otherwise used for commercial purposes  All illustrations and images included in CareNotes® are the copyrighted property of [employer]  or Jailene Mendez  The above information is an  only  It is not intended as medical advice for individual conditions or treatments  Talk to your doctor, nurse or pharmacist before following any medical regimen to see if it is safe and effective for you  How to Stop Smoking   WHAT YOU NEED TO KNOW:   You will improve your health and the health of others around you if you stop smoking  Your risk for heart and lung disease, cancer, stroke, heart attack, and vision problems will also decrease  Your adolescent can help prevent or stop harm to his or her brain or body  This will help him or her become a healthy adult  You can benefit from quitting no matter how long you have smoked  DISCHARGE INSTRUCTIONS:   Prepare to stop smoking:  Nicotine is a highly addictive drug found in cigarettes  Withdrawal symptoms can happen when you stop smoking and make it hard to quit  These include anxiety, depression, irritability, trouble sleeping, and increased appetite  You increase your chances of success if you prepare to quit  · Set a quit date  Luane Aschoff a date that is within the next 2 weeks  Do not pick a day that you think may be stressful or busy  Write down the day or [place] it on your calender  · Tell friends and family that you plan to quit  Explain that you may have withdrawal symptoms when you try to quit  Ask them to support you  They may be able to encourage you and help reduce your stress to make it easier for you to quit  · Make a list of your reasons for quitting    Put the list somewhere you will see it every day, such as your refrigerator  You can look at the list when you have a craving  · Remove all tobacco and nicotine products from your home, car, and workplace  Also, remove anything else that will tempt you to smoke, such as lighters, matches, or ashtrays  Clean your car, home, and places at work that smell like smoke  The smell of smoke can trigger a craving  · Identify triggers that make you want to smoke  This may include activities, feelings, or people  Also write down 1 way you can deal with each of your triggers  For example, if you want to smoke as soon as you wake up, plan another activity during this time, such as exercise  · Make a plan for how you will quit  Learn about the tools that can help you quit, such as medicine, counseling, or nicotine replacement therapy  Choose at least 2 options to help you quit  · Help your adolescent make a plan to quit  The plan will be more successful if your adolescent makes his or her own decisions  Do not try to pressure him or her to quit immediately or in a certain way  Be supportive and offer help if needed  Tools to help you stop smoking:   · Counseling  from a trained healthcare provider can provide you with support and skills to quit smoking  The provider will also teach you to manage your withdrawal symptoms and cravings  You may receive counseling from one counselor, in group therapy, or through phone therapy called a quit line  · Nicotine replacement therapy (NRT)  such as nicotine patches, gum, or lozenges may help reduce your nicotine cravings  You may get these without a doctor's order  Do not use e-cigarettes or smokeless tobacco in place of cigarettes or to help you quit  They still contain nicotine  · Prescription medicines  such as nasal sprays or nicotine inhalers may help reduce your withdrawal symptoms  Other medicines may also be used to reduce your urge to smoke  Ask your healthcare provider about these medicines   You may need to start certain medicines 2 weeks before your quit date for them to work well  · Hypnosis  is a practice that helps guide you through thoughts and feelings  Hypnosis may help decrease your cravings and make you more willing to quit  · Acupuncture therapy  uses very thin needles to balance energy channels in the body  This is thought to help decrease cravings and symptoms of nicotine withdrawal     · Support groups  let you talk to others who are trying to quit or have already quit  It may be helpful to speak with others about how they quit  Manage your cravings:   · Avoid situations, people, and places that tempt you to smoke  Go to nonsmoking places, such as libraries or restaurants  Understand what tempts you and try to avoid these things  · Keep your hands busy  Hold things such as a stress ball or pen  · Put candy or toothpicks in your mouth  Keep lollipops, sugarless gum, or toothpicks with you at all times  · Do not have alcohol or caffeine  These drinks may tempt you to smoke  Drink healthy liquids such as water or juice instead  · Reward yourself when you resist your cravings  Rewards will motivate you and help you stay positive  · Do an activity that distracts you from your craving  Examples include cleaning, creating art, or gardening  Prevent weight gain after you quit:  You may gain a few pounds after you quit smoking  It is healthier for you to gain a few pounds than to continue to smoke  The following can help you prevent weight gain:  · Eat healthy foods  These include fruits, vegetables, whole-grain breads, low-fat dairy products, beans, lean meats, and fish  Eat healthy snacks, such as low-fat yogurt, if you get hungry between meals  · Drink water before, during, and between meals  This will make your stomach feel full and help prevent you from overeating  Ask your healthcare provider how much liquid to drink each day and which liquids are best for you      · Be physically active  Activity may help reduce your cravings and reduce stress  Take a walk or do some kind of physical activity every day  Ask your healthcare provider which activities are right for you  For support and more information:   · [employer]  Phone: 0- 888 - 515-9811  Web Address: zen [address] Information is for End User's use only and may not be sold, redistributed or otherwise used for commercial purposes  All illustrations and images included in CareNotes® are the copyrighted property of A D A M , Inc  or [facility] Akanksha Medina   The above information is an  only  It is not intended as medical advice for individual conditions or treatments  Talk to your doctor, nurse or pharmacist before following any medical regimen to see if it is safe and effective for you  Cigarette Smoking and Your Health, Ambulatory Care   GENERAL INFORMATION:   What are the risks to my health if I smoke? Chemicals in tobacco are addictive and damage every cell in your body  All tobacco products are dangerous to you and to nonsmokers who breathe your secondhand smoke  Even if you are a light smoker or a social smoker, you have an increased risk for cancer, heart disease, and lung disease  If you are pregnant or have diabetes, smoking increases your risk for complications  What are the benefits to my health if I stop smoking?    · Lower risk of cancer, heart disease, blood clots, heart attack, and stroke    · Lower risk of diabetes complications, such as kidney, artery, or eye disease, and nerve damage that can result in amputations    · Lower risk of lung infections and diseases, such as pneumonia, asthma, chronic bronchitis, and emphysema           · Increased benefits of chemotherapy if you already have cancer, and decreased risk for cancer returning after treatment    · Improved circulation, allowing more oxygen to be delivered to your body    · Improved ability to heal and to fight infections  What are the benefits to the health of others if I stop smoking? · Lower risk of lung cancer and heart disease in nonsmokers    · Lower risk of miscarriage, early delivery, low birth weight, and stillbirth    · Lower risk of SIDS, obesity, developmental delay, ear infections, colds, pneumonia, bronchitis, asthma, and ADHD in your child  Where can I find more information and support to stop smoking? It is never too late to stop smoking  Ask your healthcare provider for more information if you need help  · [employer]  Phone: 5- 040 - 747-5772  Web Address: [employer]  Follow up with your healthcare provider as directed:  Write down your questions so you remember to ask them during your visits  CARE AGREEMENT:   You have the right to help plan your care  Learn about your health condition and how it may be treated  Discuss treatment options with your caregivers to decide what care you want to receive  You always have the right to refuse treatment  The above information is an  only  It is not intended as medical advice for individual conditions or treatments  Talk to your doctor, nurse or pharmacist before following any medical regimen to see if it is safe and effective for you  © 2014 [address] is for End User's use only and may not be sold, redistributed or otherwise used for commercial purposes  All illustrations and images included in CareNotes® are the copyrighted property of A D A M , Inc  or Rashad Murillo

## 2021-03-19 NOTE — ASSESSMENT & PLAN NOTE
Lab Results   Component Value Date    HGBA1C 7 2 (H) 03/16/2021       Recent Labs     03/17/21  1618 03/17/21  2112 03/18/21  0732 03/18/21  1126   POCGLU 106 119 212* 156*       Blood Sugar Average: Last 72 hrs:  (P) 152 [id number] check A1c  Patient is on Jardiance at home  Continue

## 2021-03-19 NOTE — ASSESSMENT & PLAN NOTE
Patient presented with left lower leg redness, edema, pain, with extension to left inner thigh since Sunday night  Precipitated by standing on his feet for several hours on Sunday for fundraising  Denies history of lower extremity cellulitis or bad infections  Denies history of PE/DVT or family history of DVT PE  WBC 13 37 upon admission which has normalized  Patient had cellulitis with associated lymphangitis  Patient received IV clindamycin and therapeutic Lovenox in ED   Patient was on IV Ancef with good clinical response  Lower extremity venous Doppler showed no evidence of DVT  Id was consulted appreciate recommendations  Patient to be discharged on Keflex for another 5 days to finish a 7 day course  Blood cultures have been negative  Left foot x-ray was ordered as patient stepped on a foreign body about 2 weeks ago    Primary review by me did not show any foreign body

## 2021-03-19 NOTE — DISCHARGE SUMMARY
Melliss[age]ftabsonia 43  Discharge- Wynonia Blocker 1960, 56 y o  male MRN: [id number]  Unit/Bed#: [address] [place] [address] Encounter: [id number]  Primary Care Provider: Francesco Chaudhary MD   Date and time admitted to hospital: 3/15/2021 11:25 PM    * Cellulitis of left lower extremity  Assessment & Plan  Patient presented with left lower leg redness, edema, pain, with extension to left inner thigh since Sunday night  Precipitated by standing on his feet for several hours on Sunday for fundraising  Denies history of lower extremity cellulitis or bad infections  Denies history of PE/DVT or family history of DVT PE  WBC 13 37 upon admission which has normalized  Patient had cellulitis with associated lymphangitis  Patient received IV clindamycin and therapeutic Lovenox in ED   Patient was on IV Ancef with good clinical response  Lower extremity venous Doppler showed no evidence of DVT  Id was consulted appreciate recommendations  Patient to be discharged on Keflex for another 5 days to finish a 7 day course  Blood cultures have been negative  Left foot x-ray was ordered as patient stepped on a foreign body about 2 weeks ago  Primary review by me did not show any foreign body      Type 2 diabetes mellitus [facility])  Assessment & Plan  Lab Results   Component Value Date    HGBA1C 7 2 (H) 03/16/2021       Recent Labs     03/17/21  1618 03/17/21  2112 03/18/21  0732 03/18/21  1126   POCGLU 106 119 212* 156*       Blood Sugar Average: Last 72 hrs:  (P) 152 [id number] check A1c  Patient is on Jardiance at home  Continue  CAD (coronary artery disease)  Assessment & Plan  Status post PCI with stents x2 in March and October 2017  Continue aspirin, sotalol, statin  Patient follows Dr Logan Aponte as outpatient  Patient did have any recent nuclear stress test which was normal    Status post placement of implantable loop recorder  Assessment & Plan  Implant 3 years ago    Denies diagnosis of atrial fibrillation or atrial flutter  Patient is on sotalol  Patient currently is bradycardic with heart rate in 40-50 range but asymptomatic  Follow-up primary cardiologist post discharge  Hypertension  Assessment & Plan  Patient is on losartan, sotalol at home  Blood pressure is stable currently  CKD (chronic kidney disease)  Assessment & Plan  Lab Results   Component Value Date    EGFR 57 03/17/2021    EGFR 47 03/16/2021    EGFR 52 03/16/2021    CREATININE 1 32 (H) 03/17/2021    CREATININE 1 57 (H) 03/16/2021    CREATININE 1 43 (H) 03/16/2021   Baseline creatinine around 1 4's  Creatinine stable      Obesity  Assessment & Plan  Body mass index is 37 5 kg/m²  Diet and lifestyle modification    Hyperlipidemia  Assessment & Plan  Continue Trilipix and statin  MOUNIKA (obstructive sleep apnea)  Assessment & Plan  Intolerant to mask during sleep study  Advised patient to follow-up with PCP post discharge  Mild intermittent asthma without complication  Assessment & Plan  Noted history  No signs of exacerbation        Discharging Physician / Practitioner: Nataliia Alicea MD  PCP: Marlinda Phalen, MD  Admission Date:   Admission Orders (From admission, onward)     Ordered        03/16/21 0106  Place in Observation  Once                   Discharge Date: 03/18/21    Resolved Problems  Date Reviewed: 3/18/2021    None          Consultations During Hospital Stay:  · Infectious diseases    Procedures Performed:   Lower Extremity venous Doppler showed no evidence of DVT       Outpatient Tests Requested:  · Follow-up PCP    Complications:  None    Reason for Admission:  Left foot and leg redness    Hospital Course:     Zack Chino is a 56 y o  male patient with past medical diabetes, CAD, hypertension, hyperlipidemia, sleep apnea, obesity who originally presented to the hospital on 3/15/2021 due to left foot and leg redness with pain and swelling radiating into the left eye    Patient is admitted hospital for left lower extremity cellulitis  Patient had lower extremity venous Dopplers which showed no evidence of DVT  Patient also seen by infectious diseases  Patient had significant improvement with IV Ancef  Patient was transitioned to p o  FX and was discharged home with outpatient follow-up with PCP  Please see above list of diagnoses and related plan for additional information  Condition at Discharge: stable     Discharge Day Visit / Exam:     Subjective:  Patient is feeling significantly better  Denies any pain in the left leg or redness  Improved swelling  Vitals: Blood Pressure: 128/64 (03/18/21 0700)  Pulse: (!) 42 (03/18/21 0700)  Temperature: 97 7 °F (36 5 °C) (03/18/21 0700)  Temp Source: Oral (03/18/21 0700)  Respirations: 18 (03/18/21 0700)  Height: 5' 7" (170 2 cm) (03/16/21 0244)  Weight - Scale: 109 kg (239 lb 6 7 oz) (03/16/21 0244)  SpO2: 96 % (03/18/21 0700)  Exam:   Physical Exam  Constitutional:       Appearance: Normal appearance  HENT:      Head: Normocephalic and atraumatic  Eyes:      Extraocular Movements: Extraocular movements intact  Pupils: Pupils are equal, round, and reactive to light  Neck:      Musculoskeletal: Normal range of motion and neck supple  Cardiovascular:      Rate and Rhythm: Normal rate and regular rhythm  Heart sounds: No murmur  No gallop  Pulmonary:      Effort: Pulmonary effort is normal       Breath sounds: Normal breath sounds  Abdominal:      General: Bowel sounds are normal       Palpations: Abdomen is soft  Tenderness: There is no abdominal tenderness  Musculoskeletal: Normal range of motion  General: No swelling or deformity  Left lower leg: Edema present  Skin:     General: Skin is warm and dry  Comments: No further redness on the left leg or on the left thigh   Neurological:      General: No focal deficit present  Mental Status: He is alert           Discharge instructions/Information to patient and family:   See after visit summary for information provided to patient and family  Provisions for Follow-Up Care:  See after visit summary for information related to follow-up care and any pertinent home health orders  Disposition:     Home      Planned Readmission: No     Discharge Statement:  I spent 25 minutes discharging the patient  This time was spent on the day of discharge  I had direct contact with the patient on the day of discharge  Greater than 50% of the total time was spent examining patient, answering all patient questions, arranging and discussing plan of care with patient as well as directly providing post-discharge instructions  Additional time then spent on discharge activities  Discharge Medications:  See after visit summary for reconciled discharge medications provided to patient and family        ** Please Note: This note has been constructed using a voice recognition system **

## 2021-03-19 NOTE — ASSESSMENT & PLAN NOTE
Status post PCI with stents x2 in March and October 2017  Continue aspirin, sotalol, statin  Patient follows Dr Danya Foster as outpatient  Patient did have any recent nuclear stress test which was normal

## 2021-03-19 NOTE — ASSESSMENT & PLAN NOTE
Lab Results   Component Value Date    EGFR 57 03/17/2021    EGFR 47 03/16/2021    EGFR 52 03/16/2021    CREATININE 1 32 (H) 03/17/2021    CREATININE 1 57 (H) 03/16/2021    CREATININE 1 43 (H) 03/16/2021   Baseline creatinine around 1 4's  Creatinine stable

## 2021-03-21 LAB
BACTERIA BLD CULT: NORMAL
BACTERIA BLD CULT: NORMAL

## 2022-01-18 ENCOUNTER — HOSPITAL ENCOUNTER (OUTPATIENT)
Facility: AMBULARY SURGERY CENTER | Age: 62
Setting detail: OUTPATIENT SURGERY
End: 2022-01-18
Attending: OPHTHALMOLOGY | Admitting: OPHTHALMOLOGY
Payer: COMMERCIAL

## 2022-01-18 DIAGNOSIS — Z20.822 COVID-19 RULED OUT BY LABORATORY TESTING: Primary | ICD-10-CM

## 2022-01-26 RX ORDER — KETOROLAC TROMETHAMINE 5 MG/ML
1 SOLUTION OPHTHALMIC
Status: CANCELLED | OUTPATIENT
Start: 2022-02-07 | End: 2022-02-07

## 2022-01-26 RX ORDER — CYCLOPENTOLATE HYDROCHLORIDE 10 MG/ML
1 SOLUTION/ DROPS OPHTHALMIC
Status: CANCELLED | OUTPATIENT
Start: 2022-02-07 | End: 2022-02-07

## 2022-01-26 RX ORDER — LIDOCAINE HYDROCHLORIDE 20 MG/ML
1 JELLY TOPICAL
Status: CANCELLED | OUTPATIENT
Start: 2022-02-07 | End: 2022-02-07

## 2022-01-26 RX ORDER — TETRACAINE HYDROCHLORIDE 5 MG/ML
1 SOLUTION OPHTHALMIC ONCE
Status: CANCELLED | OUTPATIENT
Start: 2022-02-07 | End: 2022-01-26

## 2022-01-26 RX ORDER — PHENYLEPHRINE HCL 2.5 %
1 DROPS OPHTHALMIC (EYE)
Status: CANCELLED | OUTPATIENT
Start: 2022-02-07 | End: 2022-02-07

## 2022-02-02 PROCEDURE — U0003 INFECTIOUS AGENT DETECTION BY NUCLEIC ACID (DNA OR RNA); SEVERE ACUTE RESPIRATORY SYNDROME CORONAVIRUS 2 (SARS-COV-2) (CORONAVIRUS DISEASE [COVID-19]), AMPLIFIED PROBE TECHNIQUE, MAKING USE OF HIGH THROUGHPUT TECHNOLOGIES AS DESCRIBED BY CMS-2020-01-R: HCPCS | Performed by: OPHTHALMOLOGY

## 2022-02-02 PROCEDURE — U0005 INFEC AGEN DETEC AMPLI PROBE: HCPCS | Performed by: OPHTHALMOLOGY

## 2022-02-02 RX ORDER — CETIRIZINE HYDROCHLORIDE 10 MG/1
10 TABLET ORAL DAILY
COMMUNITY

## 2022-02-02 RX ORDER — ATORVASTATIN CALCIUM 40 MG/1
40 TABLET, FILM COATED ORAL
COMMUNITY

## 2022-02-02 NOTE — PRE-PROCEDURE INSTRUCTIONS
Pre-Surgery Instructions:   Medication Instructions    aspirin 81 mg chewable tablet Patient was instructed by Physician and understands   atorvastatin (LIPITOR) 40 mg tablet Patient was instructed by Physician and understands   Blood Glucose Monitoring Suppl (ONE TOUCH ULTRA MINI) w/Device KIT Patient was instructed by Physician and understands   cetirizine (ZyrTEC) 10 mg tablet Patient was instructed by Physician and understands   Empagliflozin (Jardiance) 25 MG TABS Patient was instructed by Physician and understands   losartan (COZAAR) 25 mg tablet Patient was instructed by Physician and understands   nitroglycerin (NITROSTAT) 0 4 mg SL tablet Patient was instructed by Physician and understands   ONE TOUCH ULTRA TEST test strip Patient was instructed by Physician and understands  [address] Patient was instructed by Physician and understands   psyllium (METAMUCIL) 58 6 % packet Patient was instructed by Physician and understands   sotalol (BETAPACE) 80 mg tablet Instructed patient per Anesthesia Guidelines  Pre op instructions reviewed with pt via phone  Instructed to take sotalol a m of procedure  Pt cannot locate his Covid vaccine card, order placed for a covid screen and pt going today as a precaution in the event he cannot find his card before surgery   brothsteve Welch [phone]  My Surgical Experience    The following information was developed to assist you to prepare for your operation  What do I need to do before coming to the hospital?   Arrange for a responsible person to drive you to and from the hospital    Arrange care for your children at home  Children are not allowed in the recovery areas of the hospital   Plan to wear clothing that is easy to put on and take off   If you are having shoulder surgery, wear a shirt that buttons or zippers in the front  Bathing  o Shower the evening before and the morning of your surgery with an antibacterial soap  Please refer to the Pre Op Showering Instructions for Surgery Patients Sheet   o Remove nail polish and all body piercing jewelry  o Do not shave any body part for at least 24 hours before surgery-this includes face, arms, legs and upper body  Food  o Nothing to eat or drink after midnight the night before your surgery  This includes candy and chewing gum  o Exception: If your surgery is after 12:00pm (noon), you may have clear liquids such as 7-Up®, ginger ale, apple or cranberry juice, Jell-O®, water, or clear broth until 8:00 am  o Do not drink milk or juice with pulp on the morning before surgery  o Do not drink alcohol 24 hours before surgery  Medicine  o Follow instructions you received from your surgeon about which medicines you may take on the day of surgery  o If instructed to take medicine on the morning of surgery, take pills with just a small sip of water  Call your prescribing doctor for specific information on what to do if you take insulin    What should I bring to the hospital?    Bring:  Sharin Heaps or a walker, if you have them, for foot or knee surgery   A list of the daily medicines, vitamins, minerals, herbals and nutritional supplements you take  Include the dosages of medicines and the time you take them each day   Glasses, dentures or hearing aids   Minimal clothing; you will be wearing hospital sleepwear   Photo ID; required to verify your identity   If you have a Living Will or Power of , bring a copy of the documents   If you have an ostomy, bring an extra pouch and any supplies you use    Do not bring   Medicines or inhalers   Money, valuables or jewelry    What other information should I know about the day of surgery?  Notify your surgeons if you develop a cold, sore throat, cough, fever, rash or any other illness     Report to the Ambulatory Surgical/Same Day Surgery Unit   You will be instructed to stop at Registration only if you have not been pre-registered   Inform your  fi they do not stay that they will be asked by the staff to leave a phone number where they can be reached   Be available to be reached before surgery  In the event the operating room schedule changes, you may be asked to come in earlier or later than expected    *It is important to tell your doctor and others involved in your health care if you are taking or have been taking any non-prescription drugs, vitamins, minerals, herbals or other nutritional supplements   Any of these may interact with some food or medicines and cause a reaction

## 2022-02-07 ENCOUNTER — ANESTHESIA (OUTPATIENT)
Dept: PERIOP | Facility: AMBULARY SURGERY CENTER | Age: 62
End: 2022-02-07

## 2022-02-07 ENCOUNTER — ANESTHESIA EVENT (OUTPATIENT)
Dept: PERIOP | Facility: AMBULARY SURGERY CENTER | Age: 62
End: 2022-02-07

## 2022-03-09 NOTE — PRE-PROCEDURE INSTRUCTIONS
Pre-Surgery Instructions:   Medication Instructions    aspirin 81 mg chewable tablet Patient was instructed by Physician and understands   atorvastatin (LIPITOR) 40 mg tablet Patient was instructed by Physician and understands   Blood Glucose Monitoring Suppl (ONE TOUCH ULTRA MINI) w/Device KIT Patient was instructed by Physician and understands   cetirizine (ZyrTEC) 10 mg tablet Patient was instructed by Physician and understands   Empagliflozin (Jardiance) 25 MG TABS Patient was instructed by Physician and understands   losartan (COZAAR) 25 mg tablet Patient was instructed by Physician and understands   nitroglycerin (NITROSTAT) 0 4 mg SL tablet Patient was instructed by Physician and understands   ONE TOUCH ULTRA TEST test strip Patient was instructed by Physician and understands  [address] Patient was instructed by Physician and understands   psyllium (METAMUCIL) 58 6 % packet Patient was instructed by Physician and understands   sotalol (BETAPACE) 80 mg tablet Instructed patient per Anesthesia Guidelines  Pre op instructions reviewed with pt via phone  Instructed to take sotalol a m of procedure   brother Florina Arriaga [phone]  My Surgical Experience    The following information was developed to assist you to prepare for your operation  What do I need to do before coming to the hospital?   Arrange for a responsible person to drive you to and from the hospital    Arrange care for your children at home  Children are not allowed in the recovery areas of the hospital   Plan to wear clothing that is easy to put on and take off  If you are having shoulder surgery, wear a shirt that buttons or zippers in the front  Bathing  o Shower the evening before and the morning of your surgery with an antibacterial soap   Please refer to the Pre Op Showering Instructions for Surgery Patients Sheet   o Remove nail polish and all body piercing jewelry  o Do not shave any body part for at least 24 hours before surgery-this includes face, arms, legs and upper body  Food  o Nothing to eat or drink after midnight the night before your surgery  This includes candy and chewing gum  o Exception: If your surgery is after 12:00pm (noon), you may have clear liquids such as 7-Up®, ginger ale, apple or cranberry juice, Jell-O®, water, or clear broth until 8:00 am  o Do not drink milk or juice with pulp on the morning before surgery  o Do not drink alcohol 24 hours before surgery  Medicine  o Follow instructions you received from your surgeon about which medicines you may take on the day of surgery  o If instructed to take medicine on the morning of surgery, take pills with just a small sip of water  Call your prescribing doctor for specific information on what to do if you take insulin    What should I bring to the hospital?    Bring:  Oh Ochoa or a walker, if you have them, for foot or knee surgery   A list of the daily medicines, vitamins, minerals, herbals and nutritional supplements you take  Include the dosages of medicines and the time you take them each day   Glasses, dentures or hearing aids   Minimal clothing; you will be wearing hospital sleepwear   Photo ID; required to verify your identity   If you have a Living Will or Power of , bring a copy of the documents   If you have an ostomy, bring an extra pouch and any supplies you use    Do not bring   Medicines or inhalers   Money, valuables or jewelry    What other information should I know about the day of surgery?  Notify your surgeons if you develop a cold, sore throat, cough, fever, rash or any other illness     Report to the Ambulatory Surgical/Same Day Surgery Unit   You will be instructed to stop at Registration only if you have not been pre-registered   Inform your  fi they do not stay that they will be asked by the staff to leave a phone number where they can be reached   Be available to be reached before surgery  In the event the operating room schedule changes, you may be asked to come in earlier or later than expected    *It is important to tell your doctor and others involved in your health care if you are taking or have been taking any non-prescription drugs, vitamins, minerals, herbals or other nutritional supplements   Any of these may interact with some food or medicines and cause a reaction

## 2022-03-14 ENCOUNTER — ANESTHESIA (OUTPATIENT)
Dept: PERIOP | Facility: AMBULARY SURGERY CENTER | Age: 62
End: 2022-03-14
Payer: COMMERCIAL

## 2022-03-14 ENCOUNTER — ANESTHESIA EVENT (OUTPATIENT)
Dept: PERIOP | Facility: AMBULARY SURGERY CENTER | Age: 62
End: 2022-03-14
Payer: COMMERCIAL

## 2022-03-14 ENCOUNTER — HOSPITAL ENCOUNTER (OUTPATIENT)
Facility: AMBULARY SURGERY CENTER | Age: 62
Setting detail: OUTPATIENT SURGERY
Discharge: HOME | End: 2022-03-14
Attending: OPHTHALMOLOGY | Admitting: OPHTHALMOLOGY
Payer: COMMERCIAL

## 2022-03-14 VITALS
BODY MASS INDEX: 36.81 KG/M2 | RESPIRATION RATE: 20 BRPM | TEMPERATURE: 96 F | OXYGEN SATURATION: 98 % | HEART RATE: 56 BPM | DIASTOLIC BLOOD PRESSURE: 56 MMHG | WEIGHT: 235 LBS | SYSTOLIC BLOOD PRESSURE: 109 MMHG

## 2022-03-14 DIAGNOSIS — H25.11 AGE-RELATED NUCLEAR CATARACT OF RIGHT EYE: Primary | ICD-10-CM

## 2022-03-14 LAB — GLUCOSE SERPL-MCNC: 143 MG/DL (ref 65–140)

## 2022-03-14 PROCEDURE — V2632 POST CHMBR INTRAOCULAR LENS: HCPCS | Performed by: OPHTHALMOLOGY

## 2022-03-14 PROCEDURE — 82948 REAGENT STRIP/BLOOD GLUCOSE: CPT

## 2022-03-14 RX ORDER — GATIFLOXACIN 5 MG/ML
SOLUTION/ DROPS OPHTHALMIC AS NEEDED
Status: DISCONTINUED | OUTPATIENT
Start: 2022-03-14 | End: 2022-03-14 | Stop reason: HOSPADM

## 2022-03-14 RX ORDER — TETRACAINE HYDROCHLORIDE 5 MG/ML
1 SOLUTION OPHTHALMIC ONCE
Status: COMPLETED | OUTPATIENT
Start: 2022-03-14 | End: 2022-03-14

## 2022-03-14 RX ORDER — LIDOCAINE HYDROCHLORIDE 20 MG/ML
1 JELLY TOPICAL
Status: COMPLETED | OUTPATIENT
Start: 2022-03-14 | End: 2022-03-14

## 2022-03-14 RX ORDER — TETRACAINE HYDROCHLORIDE 5 MG/ML
SOLUTION OPHTHALMIC AS NEEDED
Status: DISCONTINUED | OUTPATIENT
Start: 2022-03-14 | End: 2022-03-14 | Stop reason: HOSPADM

## 2022-03-14 RX ORDER — GLYCOPYRROLATE 0.2 MG/ML
INJECTION INTRAMUSCULAR; INTRAVENOUS AS NEEDED
Status: DISCONTINUED | OUTPATIENT
Start: 2022-03-14 | End: 2022-03-14

## 2022-03-14 RX ORDER — GATIFLOXACIN 5 MG/ML
1 SOLUTION/ DROPS OPHTHALMIC 2 TIMES DAILY
Qty: 3 ML | Refills: 0
Start: 2022-03-14

## 2022-03-14 RX ORDER — PHENYLEPHRINE HCL 2.5 %
1 DROPS OPHTHALMIC (EYE)
Status: COMPLETED | OUTPATIENT
Start: 2022-03-14 | End: 2022-03-14

## 2022-03-14 RX ORDER — MIDAZOLAM HYDROCHLORIDE 2 MG/2ML
INJECTION, SOLUTION INTRAMUSCULAR; INTRAVENOUS AS NEEDED
Status: DISCONTINUED | OUTPATIENT
Start: 2022-03-14 | End: 2022-03-14

## 2022-03-14 RX ORDER — KETOROLAC TROMETHAMINE 5 MG/ML
1 SOLUTION OPHTHALMIC
Status: COMPLETED | OUTPATIENT
Start: 2022-03-14 | End: 2022-03-14

## 2022-03-14 RX ORDER — BALANCED SALT SOLUTION 6.4; .75; .48; .3; 3.9; 1.7 MG/ML; MG/ML; MG/ML; MG/ML; MG/ML; MG/ML
SOLUTION OPHTHALMIC AS NEEDED
Status: DISCONTINUED | OUTPATIENT
Start: 2022-03-14 | End: 2022-03-14 | Stop reason: HOSPADM

## 2022-03-14 RX ORDER — CYCLOPENTOLATE HYDROCHLORIDE 10 MG/ML
1 SOLUTION/ DROPS OPHTHALMIC
Status: COMPLETED | OUTPATIENT
Start: 2022-03-14 | End: 2022-03-14

## 2022-03-14 RX ORDER — ONDANSETRON 2 MG/ML
4 INJECTION INTRAMUSCULAR; INTRAVENOUS ONCE AS NEEDED
Status: DISCONTINUED | OUTPATIENT
Start: 2022-03-14 | End: 2022-03-14 | Stop reason: HOSPADM

## 2022-03-14 RX ORDER — KETOROLAC TROMETHAMINE 5 MG/ML
1 SOLUTION OPHTHALMIC 4 TIMES DAILY
Qty: 5 ML | Refills: 0
Start: 2022-03-14

## 2022-03-14 RX ADMIN — PHENYLEPHRINE HYDROCHLORIDE 1 DROP: 25 SOLUTION/ DROPS OPHTHALMIC at 09:15

## 2022-03-14 RX ADMIN — MIDAZOLAM 2 MG: 1 INJECTION INTRAMUSCULAR; INTRAVENOUS at 09:19

## 2022-03-14 RX ADMIN — CYCLOPENTOLATE HYDROCHLORIDE 1 DROP: 10 SOLUTION/ DROPS OPHTHALMIC at 08:45

## 2022-03-14 RX ADMIN — PHENYLEPHRINE HYDROCHLORIDE 1 DROP: 25 SOLUTION/ DROPS OPHTHALMIC at 09:00

## 2022-03-14 RX ADMIN — GLYCOPYRROLATE 0.2 MG: 0.2 INJECTION, SOLUTION INTRAMUSCULAR; INTRAVENOUS at 09:24

## 2022-03-14 RX ADMIN — KETOROLAC TROMETHAMINE 1 DROP: 5 SOLUTION OPHTHALMIC at 08:30

## 2022-03-14 RX ADMIN — PHENYLEPHRINE HYDROCHLORIDE 1 DROP: 25 SOLUTION/ DROPS OPHTHALMIC at 08:45

## 2022-03-14 RX ADMIN — KETOROLAC TROMETHAMINE 1 DROP: 5 SOLUTION OPHTHALMIC at 08:45

## 2022-03-14 RX ADMIN — GLYCOPYRROLATE 0.2 MG: 0.2 INJECTION, SOLUTION INTRAMUSCULAR; INTRAVENOUS at 09:22

## 2022-03-14 RX ADMIN — KETOROLAC TROMETHAMINE 1 DROP: 5 SOLUTION OPHTHALMIC at 09:15

## 2022-03-14 RX ADMIN — LIDOCAINE HYDROCHLORIDE 1 APPLICATION: 20 JELLY TOPICAL at 08:45

## 2022-03-14 RX ADMIN — PHENYLEPHRINE HYDROCHLORIDE 1 DROP: 25 SOLUTION/ DROPS OPHTHALMIC at 08:30

## 2022-03-14 RX ADMIN — CYCLOPENTOLATE HYDROCHLORIDE 1 DROP: 10 SOLUTION/ DROPS OPHTHALMIC at 09:00

## 2022-03-14 RX ADMIN — LIDOCAINE HYDROCHLORIDE 1 APPLICATION: 20 JELLY TOPICAL at 09:00

## 2022-03-14 RX ADMIN — TETRACAINE HYDROCHLORIDE 1 DROP: 5 SOLUTION OPHTHALMIC at 08:30

## 2022-03-14 RX ADMIN — CYCLOPENTOLATE HYDROCHLORIDE 1 DROP: 10 SOLUTION/ DROPS OPHTHALMIC at 08:30

## 2022-03-14 RX ADMIN — KETOROLAC TROMETHAMINE 1 DROP: 5 SOLUTION OPHTHALMIC at 09:00

## 2022-03-14 RX ADMIN — CYCLOPENTOLATE HYDROCHLORIDE 1 DROP: 10 SOLUTION/ DROPS OPHTHALMIC at 09:15

## 2022-03-14 RX ADMIN — LIDOCAINE HYDROCHLORIDE 1 APPLICATION: 20 JELLY TOPICAL at 08:30

## 2022-03-14 NOTE — ANESTHESIA POSTPROCEDURE EVALUATION
Post-Op Assessment Note    CV Status:  Stable  Pain Score: 0    Pain management: adequate     Mental Status:  Alert and awake   Hydration Status:  Euvolemic   PONV Controlled:  Controlled   Airway Patency:  Patent      Post Op Vitals Reviewed: Yes      Staff: Anesthesiologist, CRNA         No complications documented      BP      Temp     Pulse    Resp      SpO2

## 2022-03-14 NOTE — PERIOPERATIVE NURSING NOTE
Patient received from PACU in 0/10 pain, patient reactive, right eye shield intact without drainage

## 2022-03-14 NOTE — ANESTHESIA PREPROCEDURE EVALUATION
Procedure:  EXTRACTION EXTRACAPSULAR CATARACT PHACO INTRAOCULAR LENS (IOL) (Right Eye)    Relevant Problems   ANESTHESIA (within normal limits)      CARDIO   (+) CAD (coronary artery disease)   (+) Hyperlipidemia   (+) Hypertension   (+) SOBOE (shortness of breath on exertion)      ENDO   (+) Type 2 diabetes mellitus (HCC)      /RENAL   (+) CKD (chronic kidney disease)      NEURO/PSYCH   (+) History of colon polyps      PULMONARY   (+) Mild intermittent asthma without complication   (+) MOUNIKA (obstructive sleep apnea)   (+) SOBOE (shortness of breath on exertion)        Physical Exam    Airway    Mallampati score: II  TM Distance: >3 FB  Neck ROM: full     Dental   No notable dental hx     Cardiovascular  Cardiovascular exam normal    Pulmonary  Pulmonary exam normal     Other Findings        Anesthesia Plan  ASA Score- 3     Anesthesia Type- IV sedation with anesthesia with ASA Monitors  Additional Monitors:   Airway Plan:           Plan Factors-Exercise tolerance (METS): >4 METS  Chart reviewed  EKG reviewed  Existing labs reviewed  Patient summary reviewed  Patient is not a current smoker  Induction-     Postoperative Plan-     Informed Consent- Anesthetic plan and risks discussed with patient  I personally reviewed this patient with the CRNA  Discussed and agreed on the Anesthesia Plan with the AZUL Queen

## 2022-03-14 NOTE — OP NOTE
OPERATIVE REPORT    PATIENT NAME: Tristan Castillo    :  1960  MRN: [id number]  Pt Location: [facility] OR ROOM 01    Surgery Date: 3/14/2022    Surgeon(s) and Role:     * Susan Recio MD - Primary    Age-related nuclear cataract, right eye [H25 11]    Post-Op Diagnosis Codes:     * Age-related nuclear cataract, right eye [H25 11]    Procedure(s):  EXTRACTION EXTRACAPSULAR CATARACT PHACO INTRAOCULAR LENS (IOL)    Anesthesia Type:   IV Sedation with Anesthesia    Operative Indications:  Age-related nuclear cataract, right eye [H25 11]  Decreased vision to hand motion  With problems driving  Pt requested cataract sx the right eye    Procedure and Technique:    Procedure Details     The patient was brought in the OR in stable condition and placed on the operative table  The right eye was prepped and draped in the usual sterile manner  Attention was directed to the right eye where a lid speculum was placed  A 2 4 mm clear corneal incision was made temperally  1 cc of Shugarcaine was irrigated into the anterior chamber followed by Vision Blue Dye  The dye was rinsed clear after 10 seconds and the Viscoat was placed to deepen the [facility]  The side port incision was placed superiorly  The capsularrhexis was made and the nucleus was hydrodissected with BSS  The nucleus was then removed with the phaco handpiece followed by removal of the cortical material with the I/A handpiece  The capsular bag was then filled with Provisc  The IOL was folded and placed in to the capsular bag and centered well  The remaining Provisc was removed from the eye with the I/A  The wounds were hydrated with BSS and found to be water tight  The lid speculum was removed and 2 drops of Gatifloxicin were placed over the cornea  A protective eye shield was taped over the eye and the patient went to PACU in stable condition  I will see the patient in the office tomorrow and the expected post op period is a few weeks         Complications: None Disposition: PACU   Condition: Stable    SIGNATURE: Radha Waggoner MD  DATE: March 14, 2022  TIME: 9:46 AM

## 2022-03-14 NOTE — DISCHARGE INSTRUCTIONS
Dr Hiwot Stevens Cataract Instructions    Activity:     1  No Driving until instructed   2  Keep shield on until seen tomorrow except when administering drops   3  No heavy lifting   4  No water in eye     Diet:     1  Resume normal diet    Normal Symptoms:     1  Mild Headache   2  Scratchy or picky feeling around eye    Call the office if:     1  You have any questions or concerns   2  If eye pain is not relieved by extra strength tylenol    Office phone number:  [phone]      Next appointment:     1  See Dr Hiwot Stevens at his office tomorrow as scheduled   __________3/15/22 at 8am________________________________________________   2  Bring blue eye kit with you and eyedrops to the office    A new set of comprehensive instructions will be given and reviewed with you during your office visit tomorrow

## 2022-04-14 NOTE — ASSESSMENT & PLAN NOTE
Personal history of colon polyps- patient is at increased risk for colon cancer screening  Rule out colorectal lesions including polyps or malignancy     -Schedule for colonoscopy  Patient is at increased risks because of recent stent placement and antiplatelet therapy  Prefer to wait at least for a year since his last stent placement  Patient has an appointment with his cardiologist this week after which he will give was a call about the timing of colonoscopy  Discussed with him about the diagnostic colonoscopy if needed  -High-fiber diet     -Patient was given instructions about the colonoscopy prep     -Patient was explained about  the risks and benefits of the procedure  Risks including but not limited to bleeding, infection, perforation were explained in detail  Also explained about less than 100% sensitivity with the exam and other alternatives  [place] 45 day post Watchman

## 2022-06-30 ENCOUNTER — HOSPITAL ENCOUNTER (EMERGENCY)
Facility: HOSPITAL | Age: 62
Discharge: HOME | End: 2022-07-01
Attending: EMERGENCY MEDICINE
Payer: COMMERCIAL

## 2022-06-30 VITALS
TEMPERATURE: 98.2 F | RESPIRATION RATE: 20 BRPM | BODY MASS INDEX: 37.87 KG/M2 | WEIGHT: 241.8 LBS | DIASTOLIC BLOOD PRESSURE: 67 MMHG | HEART RATE: 55 BPM | OXYGEN SATURATION: 96 % | SYSTOLIC BLOOD PRESSURE: 140 MMHG

## 2022-06-30 DIAGNOSIS — L03.115 CELLULITIS OF RIGHT LEG: Primary | ICD-10-CM

## 2022-06-30 PROCEDURE — 99283 EMERGENCY DEPT VISIT LOW MDM: CPT

## 2022-06-30 RX ORDER — CEPHALEXIN 500 MG/1
500 CAPSULE ORAL EVERY 6 HOURS SCHEDULED
Qty: 28 CAPSULE | Refills: 0 | Status: SHIPPED | OUTPATIENT
Start: 2022-06-30 | End: 2022-07-07

## 2022-06-30 RX ORDER — CEPHALEXIN 500 MG/1
500 CAPSULE ORAL ONCE
Status: COMPLETED | OUTPATIENT
Start: 2022-07-01 | End: 2022-06-30

## 2022-06-30 RX ADMIN — CEPHALEXIN 500 MG: 500 CAPSULE ORAL at 23:58

## 2022-06-30 NOTE — Clinical Note
Jordan Lepe was seen and treated in our emergency department on 6/30/2022  Diagnosis:     Fauzia Valencia  may return to work on return date  He may return on this date: 07/02/2022         If you have any questions or concerns, please don't hesitate to call        Genesis Winston DO    ______________________________           _______________          _______________  Hospital Representative                              Date                                Time

## 2022-07-01 PROCEDURE — 99284 EMERGENCY DEPT VISIT MOD MDM: CPT | Performed by: EMERGENCY MEDICINE

## 2022-07-01 NOTE — ED PROVIDER NOTES
History  Chief Complaint   Patient presents with    Cellulitis     States hx of cellulitis leg in past and started with redness right leg at knee last night and very painful  58-year-old male presents the ED with redness shortness to the lateral aspect of his right knee  States he has had this before it was cellulitis which started  No fevers chills or systemic symptoms  History provided by:  Patient   used: No        Prior to Admission Medications   Prescriptions Last Dose Informant Patient Reported? Taking?    Blood Glucose Monitoring Suppl (ONE TOUCH ULTRA MINI) w/Device KIT   Yes No   Empagliflozin (Jardiance) 25 MG TABS   Yes No   Sig: Take 25 mg by mouth in the morning     ONE TOUCH ULTRA TEST test strip   Yes No   Sig: TEST twice a day   ONETOUCH DELICA LANCETS FINE MISC   Yes No   Sig: TEST twice a day   aspirin 81 mg chewable tablet   Yes No   Sig: Chew 81 mg daily at bedtime    atorvastatin (LIPITOR) 40 mg tablet   Yes No   Sig: Take 40 mg by mouth daily at bedtime   cetirizine (ZyrTEC) 10 mg tablet   Yes No   Sig: Take 10 mg by mouth daily   gatifloxacin (ZYMAXID) 0 5 %   No No   Sig: Administer 1 drop to the right eye 2 (two) times a day   ketorolac (ACULAR) 0 5 % ophthalmic solution   No No   Sig: Administer 1 drop to the right eye 4 (four) times a day   losartan (COZAAR) 25 mg tablet   Yes No   Sig: Take 25 mg by mouth daily at bedtime    nitroglycerin (NITROSTAT) 0 4 mg SL tablet   Yes No   Sig: Place 0 4 mg under the tongue every 5 (five) minutes as needed for chest pain   psyllium (METAMUCIL) 58 6 % packet   Yes No   Sig: Take 1 packet by mouth daily   sotalol (BETAPACE) 80 mg tablet   Yes No   Si mg 2 (two) times a day        Facility-Administered Medications: None       Past Medical History:   Diagnosis Date    BPH (benign prostatic hyperplasia)     CAD (coronary artery disease)     Claustrophobia     Colon polyp     Diabetes mellitus ([facility] [address] )     Expressive aphasia     only after anesthesia lasts about one hour    Hyperlipidemia     Hypertension     Migraine     Hx none since 2014    Orthostatic hypotension     Shortness of breath     exertional SOB    Sleep apnea     [place] not wear mask ,ill fitting and noise made it impossible to wear       Past Surgical History:   Procedure Laterality Date    CARDIAC LOOP RECORDER      COLONOSCOPY      CORONARY STENT PLACEMENT N/A mar and oct 2017    x2    ME XCAPSL CTRC RMVL INSJ IO LENS PROSTH W/O ECP Left 8/31/2020    Procedure: EXTRACTION EXTRACAPSULAR CATARACT PHACO INTRAOCULAR LENS (IOL); Surgeon: Roseann Ramirez MD;  Location: [facility] MAIN OR;  Service: Ophthalmology    ME XCAPSL CTRC RMVL INSJ IO LENS PROSTH W/O ECP Right 3/14/2022    Procedure: EXTRACTION EXTRACAPSULAR CATARACT PHACO INTRAOCULAR LENS (IOL); Surgeon: Roseann Ramirez MD;  Location: [facility] MAIN OR;  Service: Ophthalmology       Family History   Problem Relation Age of Onset    Colon cancer Maternal Grandmother     Breast cancer Mother     Other Mother     Heart Valve Disease Father     Leukemia Father     Migraines Sister     Heart failure Family      I have reviewed and agree with the history as documented  E-Cigarette/Vaping    E-Cigarette Use Never User      E-Cigarette/Vaping Substances     Social History     Tobacco Use    Smoking status: Current Some Day Smoker     Types: Cigars     Start date: 8/21/1992    Smokeless tobacco: Never Used    Tobacco comment: 2 a day  was 6 a day for 6 months only   Vaping Use    Vaping Use: Never used   Substance Use Topics    Alcohol use: Yes     Comment: 1-2 beers once a week    Drug use: No       Review of Systems   Constitutional: Negative for activity change, chills, diaphoresis and fever  HENT: Negative for congestion, ear pain, nosebleeds, sore throat, trouble swallowing and voice change  Eyes: Negative for pain, discharge and redness     Respiratory: Negative for apnea, cough, choking, shortness of breath, wheezing and stridor  Cardiovascular: Negative for chest pain and palpitations  Gastrointestinal: Negative for abdominal distention, abdominal pain, constipation, diarrhea, nausea and vomiting  Endocrine: Negative for polydipsia  Genitourinary: Negative for difficulty urinating, dysuria, flank pain, frequency, hematuria and urgency  Musculoskeletal: Negative for back pain, gait problem, joint swelling, myalgias, neck pain and neck stiffness  Skin: Positive for color change and wound  Negative for pallor and rash  Neurological: Negative for dizziness, tremors, syncope, speech difficulty, weakness, numbness and headaches  Hematological: Negative for adenopathy  Psychiatric/Behavioral: Negative for confusion, hallucinations, self-injury and suicidal ideas  The patient is not nervous/anxious  Physical Exam  Physical Exam  Vitals and nursing note reviewed  Constitutional:       General: He is not in acute distress  Appearance: He is well-developed  He is not diaphoretic  HENT:      Head: Normocephalic and atraumatic  Right Ear: External ear normal       Left Ear: External ear normal       Nose: Nose normal    Eyes:      Conjunctiva/sclera: Conjunctivae normal       Pupils: Pupils are equal, round, and reactive to light  Cardiovascular:      Rate and Rhythm: Normal rate and regular rhythm  Heart sounds: Normal heart sounds  Pulmonary:      Effort: Pulmonary effort is normal       Breath sounds: Normal breath sounds  Abdominal:      General: Bowel sounds are normal       Palpations: Abdomen is soft  Musculoskeletal:         General: Normal range of motion  Cervical back: Normal range of motion and neck supple  Skin:     General: Skin is warm and dry  Findings: Erythema present  Neurological:      Mental Status: He is alert and oriented to person, place, and time  Deep Tendon Reflexes: Reflexes are normal and symmetric           Vital Signs  ED Triage Vitals [06/30/22 2244]   Temperature Pulse Respirations Blood Pressure SpO2   98 2 °F (36 8 °C) 55 20 140/67 96 %      Temp Source Heart Rate Source Patient Position - Orthostatic VS BP Location FiO2 (%)   Tympanic Monitor Sitting Left arm --      Pain Score       6           Vitals:    06/30/22 2244   BP: 140/67   Pulse: 55   Patient Position - Orthostatic VS: Sitting         Visual Acuity      ED Medications  Medications   cephalexin (KEFLEX) capsule 500 mg (500 mg Oral Given 6/30/22 1248)       Diagnostic Studies  Results Reviewed     None                 No orders to display              Procedures  Procedures         ED Course                               SBIRT 20yo+    Flowsheet Row Most Recent Value   SBIRT (25 yo +)    In order to provide better care to our patients, we are screening all of our patients for alcohol and drug use  Would it be okay to ask you these screening questions? No Filed at: 07/01/2022 0013                    MDM    Disposition  Final diagnoses:   Cellulitis of right leg     Time reflects when diagnosis was documented in both MDM as applicable and the Disposition within this note     Time User Action Codes Description Comment    6/30/2022 11:51 PM Esvin Genao Add [L03 115] Cellulitis of right leg       ED Disposition     ED Disposition   Discharge    Condition   Stable    Date/Time   Thu Jun 30, 2022 11:51 PM    Comment   Álvaro Ruiz discharge to home/self care                 Follow-up Information     Follow up With Specialties Details Why Contact Info Additional Information    [address] [facility] Emergency Department Emergency Medicine  As needed [address] [address] Emergency Department, [place], [place], [facility]          Discharge Medication List as of 6/30/2022 11:52 PM      START taking these medications    Details   cephalexin (KEFLEX) 500 mg capsule Take 1 capsule (500 mg total) by mouth every 6 (six) hours for 7 days, Starting Thu 6/30/2022, Until Thu 7/7/2022, Normal         CONTINUE these medications which have NOT CHANGED    Details   aspirin 81 mg chewable tablet Chew 81 mg daily at bedtime , Starting Tue 3/20/2018, Historical Med      atorvastatin (LIPITOR) 40 mg tablet Take 40 mg by mouth daily at bedtime, Historical Med      Blood Glucose Monitoring Suppl (ONE TOUCH ULTRA MINI) w/Device KIT Starting Thu 5/10/2018, Historical Med      cetirizine (ZyrTEC) 10 mg tablet Take 10 mg by mouth daily, Historical Med      Empagliflozin (Jardiance) 25 MG TABS Take 25 mg by mouth in the morning  , Historical Med      gatifloxacin (ZYMAXID) 0 5 % Administer 1 drop to the right eye 2 (two) times a day, Starting Mon 3/14/2022, No Print      ketorolac (ACULAR) 0 5 % ophthalmic solution Administer 1 drop to the right eye 4 (four) times a day, Starting Mon 3/14/2022, No Print      losartan (COZAAR) 25 mg tablet Take 25 mg by mouth daily at bedtime , Historical Med      nitroglycerin (NITROSTAT) 0 4 mg SL tablet Place 0 4 mg under the tongue every 5 (five) minutes as needed for chest pain, Historical Med      ONE TOUCH ULTRA TEST test strip TEST twice a day, Historical Med      ONETOUCH DELICA LANCETS FINE MISC TEST twice a day, Historical Med      psyllium (METAMUCIL) 58 6 % packet Take 1 packet by mouth daily, Historical Med      sotalol (BETAPACE) 80 mg tablet 80 mg 2 (two) times a day  , Starting Wed 5/15/2019, Historical Med             No discharge procedures on file      PDMP Review     None          ED Provider  Electronically Signed by           Kanchan Liu DO  07/01/22 5180

## 2023-10-20 ENCOUNTER — HOSPITAL ENCOUNTER (EMERGENCY)
Facility: HOSPITAL | Age: 63
Discharge: HOME | End: 2023-10-21
Attending: EMERGENCY MEDICINE
Payer: COMMERCIAL

## 2023-10-20 DIAGNOSIS — R10.9 ABDOMINAL PAIN: Primary | ICD-10-CM

## 2023-10-20 PROCEDURE — 85025 COMPLETE CBC W/AUTO DIFF WBC: CPT | Performed by: EMERGENCY MEDICINE

## 2023-10-20 PROCEDURE — 80053 COMPREHEN METABOLIC PANEL: CPT | Performed by: EMERGENCY MEDICINE

## 2023-10-20 PROCEDURE — 93005 ELECTROCARDIOGRAM TRACING: CPT

## 2023-10-20 PROCEDURE — 99285 EMERGENCY DEPT VISIT HI MDM: CPT

## 2023-10-20 PROCEDURE — 83690 ASSAY OF LIPASE: CPT | Performed by: EMERGENCY MEDICINE

## 2023-10-20 PROCEDURE — 96361 HYDRATE IV INFUSION ADD-ON: CPT

## 2023-10-20 PROCEDURE — 36415 COLL VENOUS BLD VENIPUNCTURE: CPT | Performed by: EMERGENCY MEDICINE

## 2023-10-20 RX ADMIN — SODIUM CHLORIDE 500 ML: 0.9 INJECTION, SOLUTION INTRAVENOUS at 23:55

## 2023-10-21 ENCOUNTER — APPOINTMENT (EMERGENCY)
Dept: RADIOLOGY | Facility: HOSPITAL | Age: 63
End: 2023-10-21
Payer: COMMERCIAL

## 2023-10-21 VITALS
TEMPERATURE: 97.9 F | OXYGEN SATURATION: 96 % | SYSTOLIC BLOOD PRESSURE: 133 MMHG | HEART RATE: 47 BPM | BODY MASS INDEX: 37.74 KG/M2 | RESPIRATION RATE: 18 BRPM | WEIGHT: 240.96 LBS | DIASTOLIC BLOOD PRESSURE: 64 MMHG

## 2023-10-21 LAB
2HR DELTA HS TROPONIN: 0 NG/L
ALBUMIN SERPL BCP-MCNC: 3.9 G/DL (ref 3.5–5)
ALP SERPL-CCNC: 73 U/L (ref 34–104)
ALT SERPL W P-5'-P-CCNC: 33 U/L (ref 7–52)
ANION GAP SERPL CALCULATED.3IONS-SCNC: 9 MMOL/L
AST SERPL W P-5'-P-CCNC: 18 U/L (ref 13–39)
BACTERIA UR QL AUTO: NORMAL /HPF
BASOPHILS # BLD AUTO: 0.06 THOUSANDS/ÂΜL (ref 0–0.1)
BASOPHILS NFR BLD AUTO: 1 % (ref 0–1)
BILIRUB SERPL-MCNC: 0.57 MG/DL (ref 0.2–1)
BILIRUB UR QL STRIP: NEGATIVE
BUN SERPL-MCNC: 25 MG/DL (ref 5–25)
CALCIUM SERPL-MCNC: 8.9 MG/DL (ref 8.4–10.2)
CARDIAC TROPONIN I PNL SERPL HS: 6 NG/L
CARDIAC TROPONIN I PNL SERPL HS: 6 NG/L
CHLORIDE SERPL-SCNC: 103 MMOL/L (ref 96–108)
CLARITY UR: CLEAR
CO2 SERPL-SCNC: 23 MMOL/L (ref 21–32)
COLOR UR: YELLOW
CREAT SERPL-MCNC: 0.93 MG/DL (ref 0.6–1.3)
EOSINOPHIL # BLD AUTO: 0.23 THOUSAND/ÂΜL (ref 0–0.61)
EOSINOPHIL NFR BLD AUTO: 2 % (ref 0–6)
ERYTHROCYTE [DISTWIDTH] IN BLOOD BY AUTOMATED COUNT: 12.3 % (ref 11.6–15.1)
GFR SERPL CREATININE-BSD FRML MDRD: 86 ML/MIN/1.73SQ M
GLUCOSE SERPL-MCNC: 224 MG/DL (ref 65–140)
GLUCOSE UR STRIP-MCNC: ABNORMAL MG/DL
HCT VFR BLD AUTO: 49.7 % (ref 36.5–49.3)
HGB BLD-MCNC: 17 G/DL (ref 12–17)
HGB UR QL STRIP.AUTO: ABNORMAL
IMM GRANULOCYTES # BLD AUTO: 0.03 THOUSAND/UL (ref 0–0.2)
IMM GRANULOCYTES NFR BLD AUTO: 0 % (ref 0–2)
KETONES UR STRIP-MCNC: NEGATIVE MG/DL
LEUKOCYTE ESTERASE UR QL STRIP: ABNORMAL
LIPASE SERPL-CCNC: 29 U/L (ref 11–82)
LYMPHOCYTES # BLD AUTO: 3.49 THOUSANDS/ÂΜL (ref 0.6–4.47)
LYMPHOCYTES NFR BLD AUTO: 32 % (ref 14–44)
MCH RBC QN AUTO: 31.5 PG (ref 26.8–34.3)
MCHC RBC AUTO-ENTMCNC: 34.2 G/DL (ref 31.4–37.4)
MCV RBC AUTO: 92 FL (ref 82–98)
MONOCYTES # BLD AUTO: 0.87 THOUSAND/ÂΜL (ref 0.17–1.22)
MONOCYTES NFR BLD AUTO: 8 % (ref 4–12)
NEUTROPHILS # BLD AUTO: 6.28 THOUSANDS/ÂΜL (ref 1.85–7.62)
NEUTS SEG NFR BLD AUTO: 57 % (ref 43–75)
NITRITE UR QL STRIP: NEGATIVE
NON-SQ EPI CELLS URNS QL MICRO: NORMAL /HPF
NRBC BLD AUTO-RTO: 0 /100 WBCS
PH UR STRIP.AUTO: 5 [PH]
PLATELET # BLD AUTO: 190 THOUSANDS/UL (ref 149–390)
PMV BLD AUTO: 10.5 FL (ref 8.9–12.7)
POTASSIUM SERPL-SCNC: 4 MMOL/L (ref 3.5–5.3)
PROT SERPL-MCNC: 6.3 G/DL (ref 6.4–8.4)
PROT UR STRIP-MCNC: NEGATIVE MG/DL
RBC # BLD AUTO: 5.4 MILLION/UL (ref 3.88–5.62)
RBC #/AREA URNS AUTO: NORMAL /HPF
SODIUM SERPL-SCNC: 135 MMOL/L (ref 135–147)
SP GR UR STRIP.AUTO: 1.01 (ref 1–1.03)
UROBILINOGEN UR QL STRIP.AUTO: 0.2 E.U./DL
WBC # BLD AUTO: 10.96 THOUSAND/UL (ref 4.31–10.16)
WBC #/AREA URNS AUTO: NORMAL /HPF

## 2023-10-21 PROCEDURE — 74177 CT ABD & PELVIS W/CONTRAST: CPT

## 2023-10-21 PROCEDURE — 96374 THER/PROPH/DIAG INJ IV PUSH: CPT

## 2023-10-21 PROCEDURE — 84484 ASSAY OF TROPONIN QUANT: CPT | Performed by: EMERGENCY MEDICINE

## 2023-10-21 PROCEDURE — 81001 URINALYSIS AUTO W/SCOPE: CPT | Performed by: EMERGENCY MEDICINE

## 2023-10-21 PROCEDURE — 99285 EMERGENCY DEPT VISIT HI MDM: CPT | Performed by: EMERGENCY MEDICINE

## 2023-10-21 PROCEDURE — G1004 CDSM NDSC: HCPCS

## 2023-10-21 PROCEDURE — 36415 COLL VENOUS BLD VENIPUNCTURE: CPT | Performed by: EMERGENCY MEDICINE

## 2023-10-21 PROCEDURE — C9113 INJ PANTOPRAZOLE SODIUM, VIA: HCPCS | Performed by: EMERGENCY MEDICINE

## 2023-10-21 RX ORDER — FAMOTIDINE 20 MG/1
20 TABLET, FILM COATED ORAL 2 TIMES DAILY
Qty: 30 TABLET | Refills: 0 | Status: SHIPPED | OUTPATIENT
Start: 2023-10-21

## 2023-10-21 RX ORDER — PANTOPRAZOLE SODIUM 40 MG/10ML
40 INJECTION, POWDER, LYOPHILIZED, FOR SOLUTION INTRAVENOUS ONCE
Status: COMPLETED | OUTPATIENT
Start: 2023-10-21 | End: 2023-10-21

## 2023-10-21 RX ADMIN — PANTOPRAZOLE SODIUM 40 MG: 40 INJECTION, POWDER, FOR SOLUTION INTRAVENOUS at 00:38

## 2023-10-21 RX ADMIN — IOHEXOL 100 ML: 350 INJECTION, SOLUTION INTRAVENOUS at 01:39

## 2023-10-21 NOTE — ED PROVIDER NOTES
History  Chief Complaint   Patient presents with    Abdominal Pain     C/o ab pain for 2 days. Worst with lifting. States no hernia. States diarrhea for 2d. No nausea/vomiting. 56 yo male c/o worsening upper abdominal pain x 2 days. Initially intermittent but now more constant. No vomiting.  + diarrhea. No fever. No history of abdominal surgery. + cardiac history. History provided by:  Patient   used: No    Abdominal Pain  Associated symptoms: diarrhea    Associated symptoms: no chest pain, no chills, no cough, no dysuria, no fever, no hematuria, no nausea, no shortness of breath, no sore throat and no vomiting        Prior to Admission Medications   Prescriptions Last Dose Informant Patient Reported? Taking?    Blood Glucose Monitoring Suppl (ONE TOUCH ULTRA MINI) w/Device KIT   Yes No   Empagliflozin (Jardiance) 25 MG TABS   Yes No   Sig: Take 25 mg by mouth in the morning     ONE TOUCH ULTRA TEST test strip   Yes No   Sig: TEST twice a day   ONETOUCH DELICA LANCETS FINE MISC   Yes No   Sig: TEST twice a day   aspirin 81 mg chewable tablet   Yes No   Sig: Chew 81 mg daily at bedtime    atorvastatin (LIPITOR) 40 mg tablet   Yes No   Sig: Take 40 mg by mouth daily at bedtime   cetirizine (ZyrTEC) 10 mg tablet   Yes No   Sig: Take 10 mg by mouth daily   gatifloxacin (ZYMAXID) 0.5 %   No No   Sig: Administer 1 drop to the right eye 2 (two) times a day   ketorolac (ACULAR) 0.5 % ophthalmic solution   No No   Sig: Administer 1 drop to the right eye 4 (four) times a day   losartan (COZAAR) 25 mg tablet   Yes No   Sig: Take 25 mg by mouth daily at bedtime    nitroglycerin (NITROSTAT) 0.4 mg SL tablet   Yes No   Sig: Place 0.4 mg under the tongue every 5 (five) minutes as needed for chest pain   psyllium (METAMUCIL) 58.6 % packet   Yes No   Sig: Take 1 packet by mouth daily   sotalol (BETAPACE) 80 mg tablet   Yes No   Si mg 2 (two) times a day        Facility-Administered Medications: None       Past Medical History:   Diagnosis Date    BPH (benign prostatic hyperplasia)     CAD (coronary artery disease)     Claustrophobia     Colon polyp     Diabetes mellitus ([address])     Expressive aphasia     only after anesthesia lasts about one hour    Hyperlipidemia     Hypertension     Migraine     Hx none since 2014    Orthostatic hypotension     Shortness of breath     exertional SOB    Sleep apnea     [place] not wear mask ,ill fitting and noise made it impossible to wear       Past Surgical History:   Procedure Laterality Date    CARDIAC LOOP RECORDER      COLONOSCOPY      CORONARY STENT PLACEMENT N/A mar and oct 2017    x2    AL XCAPSL CTRC RMVL INSJ IO LENS PROSTH W/O ECP Left 8/31/2020    Procedure: EXTRACTION EXTRACAPSULAR CATARACT PHACO INTRAOCULAR LENS (IOL); Surgeon: Marshall Dias MD;  Location: [facility] MAIN OR;  Service: Ophthalmology    AL XCAPSL CTRC RMVL INSJ IO LENS PROSTH W/O ECP Right 3/14/2022    Procedure: EXTRACTION EXTRACAPSULAR CATARACT PHACO INTRAOCULAR LENS (IOL); Surgeon: Marshall Dias MD;  Location: [facility] MAIN OR;  Service: Ophthalmology       Family History   Problem Relation Age of Onset    Colon cancer Maternal Grandmother     Breast cancer Mother     Other Mother     Heart Valve Disease Father     Leukemia Father     Migraines Sister     Heart failure Family      I have reviewed and agree with the history as documented. E-Cigarette/Vaping    E-Cigarette Use Never User      E-Cigarette/Vaping Substances     Social History     Tobacco Use    Smoking status: Some Days     Types: Cigars     Start date: 8/21/1992    Smokeless tobacco: Never    Tobacco comments:     2 a day  was 6 a day for 6 months only   Vaping Use    Vaping Use: Never used   Substance Use Topics    Alcohol use: Yes     Comment: 1-2 beers once a week    Drug use: No       Review of Systems   Constitutional: Negative. Negative for chills and fever. HENT: Negative. Negative for congestion and sore throat. Eyes: Negative. Respiratory: Negative. Negative for cough and shortness of breath. Cardiovascular: Negative. Negative for chest pain and leg swelling. Gastrointestinal:  Positive for abdominal pain and diarrhea. Negative for nausea and vomiting. Genitourinary: Negative. Negative for dysuria, flank pain and hematuria. Musculoskeletal: Negative. Negative for back pain and myalgias. Skin: Negative. Negative for rash and wound. Neurological: Negative. Negative for dizziness and headaches. Psychiatric/Behavioral: Negative. Negative for confusion and hallucinations. The patient is not nervous/anxious. All other systems reviewed and are negative. Physical Exam  Physical Exam  Vitals and nursing note reviewed. Constitutional:       General: He is not in acute distress. Appearance: He is well-developed. He is obese. He is not ill-appearing or diaphoretic. HENT:      Head: Normocephalic and atraumatic. Eyes:      General: No scleral icterus. Conjunctiva/sclera: Conjunctivae normal.   Cardiovascular:      Rate and Rhythm: Normal rate and regular rhythm. Heart sounds: Normal heart sounds. No murmur heard. Pulmonary:      Effort: Pulmonary effort is normal. No respiratory distress. Breath sounds: Normal breath sounds. Abdominal:      General: Bowel sounds are normal. There is no distension. Palpations: Abdomen is soft. Tenderness: There is abdominal tenderness in the right upper quadrant and epigastric area. There is no right CVA tenderness or left CVA tenderness. Musculoskeletal:         General: No deformity. Normal range of motion. Cervical back: Normal range of motion and neck supple. Right lower leg: No edema. Left lower leg: No edema. Skin:     General: Skin is warm and dry. Coloration: Skin is not pale. Findings: No erythema or rash. Neurological:      General: No focal deficit present.       Mental Status: He is alert and oriented to person, place, and time. Cranial Nerves: No cranial nerve deficit. Psychiatric:         Mood and Affect: Mood normal.         Behavior: Behavior normal.         Vital Signs  ED Triage Vitals [10/20/23 2331]   Temperature Pulse Respirations Blood Pressure SpO2   97.9 °F (36.6 °C) 56 18 (!) 188/90 96 %      Temp Source Heart Rate Source Patient Position - Orthostatic VS BP Location FiO2 (%)   Tympanic Monitor Sitting Right arm --      Pain Score       1           Vitals:    10/20/23 2331 10/21/23 0331 10/21/23 0337   BP: (!) 188/90 133/64 133/64   Pulse: 56 (!) 47    Patient Position - Orthostatic VS: Sitting           Visual Acuity      ED Medications  Medications   sodium chloride 0.9 % bolus 500 mL (0 mL Intravenous Stopped 10/21/23 0036)   pantoprazole (PROTONIX) injection 40 mg (40 mg Intravenous Given 10/21/23 0038)   iohexol (OMNIPAQUE) 350 MG/ML injection (MULTI-DOSE) 100 mL (100 mL Intravenous Given 10/21/23 0139)       Diagnostic Studies  Results Reviewed       Procedure Component Value Units Date/Time    HS Troponin I 2hr [[id number]]  (Normal) Collected: 10/21/23 0245    Lab Status: Final result Specimen: Blood from Arm, Left Updated: 10/21/23 0322     hs TnI 2hr 6 ng/L      Delta 2hr hsTnI 0 ng/L     Urine Microscopic [[id number]]  (Normal) Collected: 10/21/23 0209    Lab Status: Final result Specimen: Urine, Other Updated: 10/21/23 0309     RBC, UA 0-1 /hpf      WBC, UA None Seen /hpf      Epithelial Cells Occasional /hpf      Bacteria, UA None Seen /hpf     UA (URINE) with reflex to Scope [[id number]]  (Abnormal) Collected: 10/21/23 0209    Lab Status: Final result Specimen: Urine, Other Updated: 10/21/23 0216     Color, UA Yellow     Clarity, UA Clear     Specific Gravity, UA 1.015     pH, UA 5.0     Leukocytes, UA Elevated glucose may cause decreased leukocyte values.  See urine microscopic for UWBC result     Nitrite, UA Negative     Protein, UA Negative mg/dl      Glucose, UA >=1000 (1%) mg/dl      Ketones, UA Negative mg/dl      Urobilinogen, UA 0.2 E.U./dl      Bilirubin, UA Negative     Occult Blood, UA Trace-Intact    HS Troponin 0hr (reflex protocol) [[id number]]  (Normal) Collected: 10/21/23 0050    Lab Status: Final result Specimen: Blood from Arm, Left Updated: 10/21/23 0122     hs TnI 0hr 6 ng/L     Comprehensive metabolic panel [[id number]]  (Abnormal) Collected: 10/20/23 2355    Lab Status: Final result Specimen: Blood from Arm, Left Updated: 10/21/23 0109     Sodium 135 mmol/L      Potassium 4.0 mmol/L      Chloride 103 mmol/L      CO2 23 mmol/L      ANION GAP 9 mmol/L      BUN 25 mg/dL      Creatinine 0.93 mg/dL      Glucose 224 mg/dL      Calcium 8.9 mg/dL      AST 18 U/L      ALT 33 U/L      Alkaline Phosphatase 73 U/L      Total Protein 6.3 g/dL      Albumin 3.9 g/dL      Total Bilirubin 0.57 mg/dL      eGFR 86 ml/min/1.73sq m     Narrative:      [place] guidelines for Chronic Kidney Disease (CKD):     Stage 1 with normal or high GFR (GFR > 90 mL/min/1.73 square meters)    Stage 2 Mild CKD (GFR = 60-89 mL/min/1.73 square meters)    Stage 3A Moderate CKD (GFR = 45-59 mL/min/1.73 square meters)    Stage 3B Moderate CKD (GFR = 30-44 mL/min/1.73 square meters)    Stage 4 Severe CKD (GFR = 15-29 mL/min/1.73 square meters)    Stage 5 End Stage CKD (GFR <15 mL/min/1.73 square meters)  Note: GFR calculation is accurate only with a steady state creatinine    Lipase [[id number]]  (Normal) Collected: 10/20/23 2355    Lab Status: Final result Specimen: Blood from Arm, Left Updated: 10/21/23 0053     Lipase 29 u/L     CBC and differential [[id number]]  (Abnormal) Collected: 10/20/23 2355    Lab Status: Final result Specimen: Blood from Arm, Left Updated: 10/21/23 0000     WBC 10.96 Thousand/uL      RBC 5.40 Million/uL      Hemoglobin 17.0 g/dL      Hematocrit 49.7 %      MCV 92 fL      MCH 31.5 pg      MCHC 34.2 g/dL      RDW 12.3 %      MPV 10.5 fL Platelets 898 Thousands/uL      nRBC 0 /100 WBCs      Neutrophils Relative 57 %      Immat GRANS % 0 %      Lymphocytes Relative 32 %      Monocytes Relative 8 %      Eosinophils Relative 2 %      Basophils Relative 1 %      Neutrophils Absolute 6.28 Thousands/µL      Immature Grans Absolute 0.03 Thousand/uL      Lymphocytes Absolute 3.49 Thousands/µL      Monocytes Absolute 0.87 Thousand/µL      Eosinophils Absolute 0.23 Thousand/µL      Basophils Absolute 0.06 Thousands/µL                    CT abdomen pelvis with contrast   Final Result by Víctor Zhang MD (10/21 8036)      No acute intra-abdominal abnormality. No free air or free fluid. Workstation performed: [id number]                    Procedures  ECG 12 Lead Documentation Only    Date/Time: 10/20/2023 11:55 PM    Performed by: Richelle Bill MD  Authorized by: Richelle Bill MD    Indications / Diagnosis:  Upper abdominal pain  ECG reviewed by me, the ED Provider: yes    Patient location:  ED  Previous ECG:     Previous ECG:  Unavailable  Interpretation:     Interpretation: abnormal    Rate:     ECG rate:  53    ECG rate assessment: bradycardic    Rhythm:     Rhythm: sinus bradycardia    Ectopy:     Ectopy: none    QRS:     QRS axis:  Normal  Conduction:     Conduction: normal    ST segments:     ST segments:  Normal  T waves:     T waves: normal             ED Course                                             Medical Decision Making  Labs reviewed. Pt. Offered pain medicine but declined. Signed out to night doctor pending CT results. Amount and/or Complexity of Data Reviewed  Labs: ordered. Radiology: ordered. Risk  Prescription drug management.              Disposition  Final diagnoses:   Abdominal pain     Time reflects when diagnosis was documented in both MDM as applicable and the Disposition within this note       Time User Action Codes Description Comment    19/81/6778 17:06 AM Richelle DENISE Add [I18.8] Abdominal pain ED Disposition       ED Disposition   Discharge    Condition   Stable    Date/Time   Sat Oct 21, 2023  3:22 AM    Comment   Gabriela Chavez discharge to home/self care.                    Follow-up Information       Follow up With Specialties Details Why Contact Info Additional Information    Mac Chaney DO Family Medicine Schedule an appointment as soon as possible for a visit in 2 days for follow up [address] [facility] [address] [facility] Emergency Department Emergency Medicine Go to  If symptoms worsen, As needed [address] [address]  [address] [facility] Emergency Department, [address] [facility] [address], [place], [address]            Discharge Medication List as of 10/21/2023  3:25 AM        START taking these medications    Details   famotidine (PEPCID) 20 mg tablet Take 1 tablet (20 mg total) by mouth 2 (two) times a day, Starting Sat 10/21/2023, Normal           CONTINUE these medications which have NOT CHANGED    Details   aspirin 81 mg chewable tablet Chew 81 mg daily at bedtime , Starting Tue 3/20/2018, Historical Med      atorvastatin (LIPITOR) 40 mg tablet Take 40 mg by mouth daily at bedtime, Historical Med      Blood Glucose Monitoring Suppl (ONE TOUCH ULTRA MINI) w/Device KIT Starting Thu 5/10/2018, Historical Med      cetirizine (ZyrTEC) 10 mg tablet Take 10 mg by mouth daily, Historical Med      Empagliflozin (Jardiance) 25 MG TABS Take 25 mg by mouth in the morning  , Historical Med      gatifloxacin (ZYMAXID) 0.5 % Administer 1 drop to the right eye 2 (two) times a day, Starting Mon 3/14/2022, No Print      ketorolac (ACULAR) 0.5 % ophthalmic solution Administer 1 drop to the right eye 4 (four) times a day, Starting Mon 3/14/2022, No Print      losartan (COZAAR) 25 mg tablet Take 25 mg by mouth daily at bedtime , Historical Med      nitroglycerin (NITROSTAT) 0.4 mg SL tablet Place 0.4 mg under the tongue every 5 (five) minutes as needed for chest pain, Historical Med      ONE TOUCH ULTRA TEST test strip TEST twice a day, Historical Med      ONETOUCH DELICA LANCETS FINE MISC TEST twice a day, Historical Med      psyllium (METAMUCIL) 58.6 % packet Take 1 packet by mouth daily, Historical Med      sotalol (BETAPACE) 80 mg tablet 80 mg 2 (two) times a day  , Starting Wed 5/15/2019, Historical Med             No discharge procedures on file.     PDMP Review       None            ED Provider  Electronically Signed by             Real Lorenzo MD  21/73/30 9044

## 2023-10-21 NOTE — DISCHARGE INSTRUCTIONS
Please take 1 tablet of the pepcid twice a day. Your labs and imaging were otherwise unremarkable. Follow up with your primary care doctor    Return to the ER for worsening symptoms.

## 2023-10-22 LAB
ATRIAL RATE: 51 BPM
ATRIAL RATE: 53 BPM
P AXIS: 58 DEGREES
P AXIS: 66 DEGREES
PR INTERVAL: 170 MS
PR INTERVAL: 180 MS
QRS AXIS: 81 DEGREES
QRS AXIS: 82 DEGREES
QRSD INTERVAL: 102 MS
QRSD INTERVAL: 104 MS
QT INTERVAL: 460 MS
QT INTERVAL: 464 MS
QTC INTERVAL: 427 MS
QTC INTERVAL: 431 MS
T WAVE AXIS: 40 DEGREES
T WAVE AXIS: 44 DEGREES
VENTRICULAR RATE: 51 BPM
VENTRICULAR RATE: 53 BPM

## 2023-10-22 PROCEDURE — 93010 ELECTROCARDIOGRAM REPORT: CPT | Performed by: INTERNAL MEDICINE

## 2023-11-09 ENCOUNTER — TELEPHONE (OUTPATIENT)
Dept: PSYCHIATRY | Facility: CLINIC | Age: 63
End: 2023-11-09

## 2023-11-09 NOTE — TELEPHONE ENCOUNTER
Patient has been added to the Medication Management and Talk Therapy wait list without a referral.    Insurance: [id number] Type:    Commercial []   Medicaid []   [place] (if applicable)   Medicare [x]  Location Preference: [place]  Provider Preference: none  Virtual: Yes [] No [x]  Were outside resources sent: Yes [x] No []  Advised the patient to contact her PCP for a referral.

## 2023-11-14 LAB
LEFT EYE DIABETIC RETINOPATHY: NORMAL
RIGHT EYE DIABETIC RETINOPATHY: NORMAL

## 2024-02-06 ENCOUNTER — TELEPHONE (OUTPATIENT)
Dept: PSYCHIATRY | Facility: CLINIC | Age: 64
End: 2024-02-06
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
## 2024-02-29 ENCOUNTER — OFFICE VISIT (OUTPATIENT)
Dept: PSYCHIATRY | Facility: CLINIC | Age: 64
End: 2024-02-29
Payer: COMMERCIAL

## 2024-02-29 DIAGNOSIS — F41.1 GAD (GENERALIZED ANXIETY DISORDER): Primary | ICD-10-CM

## 2024-02-29 DIAGNOSIS — F42.3 HOARDING DISORDER: ICD-10-CM

## 2024-02-29 PROCEDURE — 90792 PSYCH DIAG EVAL W/MED SRVCS: CPT | Performed by: PHYSICIAN ASSISTANT
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
## 2024-03-16 ENCOUNTER — APPOINTMENT (EMERGENCY)
Dept: RADIOLOGY | Facility: HOSPITAL | Age: 64
End: 2024-03-16
Payer: COMMERCIAL

## 2024-03-16 ENCOUNTER — HOSPITAL ENCOUNTER (EMERGENCY)
Facility: HOSPITAL | Age: 64
Discharge: HOME | End: 2024-03-17
Attending: EMERGENCY MEDICINE
Payer: COMMERCIAL

## 2024-03-16 DIAGNOSIS — R07.9 CHEST PAIN: Primary | ICD-10-CM

## 2024-03-16 LAB
ALBUMIN SERPL BCP-MCNC: 3.9 G/DL (ref 3.5–5)
ALP SERPL-CCNC: 60 U/L (ref 34–104)
ALT SERPL W P-5'-P-CCNC: 30 U/L (ref 7–52)
ANION GAP SERPL CALCULATED.3IONS-SCNC: 5 MMOL/L (ref 4–13)
AST SERPL W P-5'-P-CCNC: 17 U/L (ref 13–39)
BASOPHILS # BLD AUTO: 0.06 THOUSANDS/ÂΜL (ref 0–0.1)
BASOPHILS NFR BLD AUTO: 1 % (ref 0–1)
BILIRUB SERPL-MCNC: 0.59 MG/DL (ref 0.2–1)
BUN SERPL-MCNC: 23 MG/DL (ref 5–25)
CALCIUM SERPL-MCNC: 9.4 MG/DL (ref 8.4–10.2)
CARDIAC TROPONIN I PNL SERPL HS: 6 NG/L
CHLORIDE SERPL-SCNC: 102 MMOL/L (ref 96–108)
CO2 SERPL-SCNC: 29 MMOL/L (ref 21–32)
CREAT SERPL-MCNC: 0.9 MG/DL (ref 0.6–1.3)
D DIMER PPP FEU-MCNC: <0.27 UG/ML FEU
EOSINOPHIL # BLD AUTO: 0.18 THOUSAND/ÂΜL (ref 0–0.61)
EOSINOPHIL NFR BLD AUTO: 2 % (ref 0–6)
ERYTHROCYTE [DISTWIDTH] IN BLOOD BY AUTOMATED COUNT: 12 % (ref 11.6–15.1)
GFR SERPL CREATININE-BSD FRML MDRD: 89 ML/MIN/1.73SQ M
GLUCOSE SERPL-MCNC: 153 MG/DL (ref 65–140)
HCT VFR BLD AUTO: 47 % (ref 36.5–49.3)
HGB BLD-MCNC: 16.4 G/DL (ref 12–17)
IMM GRANULOCYTES # BLD AUTO: 0.02 THOUSAND/UL (ref 0–0.2)
IMM GRANULOCYTES NFR BLD AUTO: 0 % (ref 0–2)
LIPASE SERPL-CCNC: 26 U/L (ref 11–82)
LYMPHOCYTES # BLD AUTO: 3.33 THOUSANDS/ÂΜL (ref 0.6–4.47)
LYMPHOCYTES NFR BLD AUTO: 28 % (ref 14–44)
MAGNESIUM SERPL-MCNC: 1.9 MG/DL (ref 1.9–2.7)
MCH RBC QN AUTO: 31.1 PG (ref 26.8–34.3)
MCHC RBC AUTO-ENTMCNC: 34.9 G/DL (ref 31.4–37.4)
MCV RBC AUTO: 89 FL (ref 82–98)
MONOCYTES # BLD AUTO: 1.14 THOUSAND/ÂΜL (ref 0.17–1.22)
MONOCYTES NFR BLD AUTO: 10 % (ref 4–12)
NEUTROPHILS # BLD AUTO: 7.13 THOUSANDS/ÂΜL (ref 1.85–7.62)
NEUTS SEG NFR BLD AUTO: 59 % (ref 43–75)
NRBC BLD AUTO-RTO: 0 /100 WBCS
PLATELET # BLD AUTO: 207 THOUSANDS/UL (ref 149–390)
PMV BLD AUTO: 10.7 FL (ref 8.9–12.7)
POTASSIUM SERPL-SCNC: 4 MMOL/L (ref 3.5–5.3)
PROT SERPL-MCNC: 6.8 G/DL (ref 6.4–8.4)
RBC # BLD AUTO: 5.27 MILLION/UL (ref 3.88–5.62)
SODIUM SERPL-SCNC: 136 MMOL/L (ref 135–147)
WBC # BLD AUTO: 11.86 THOUSAND/UL (ref 4.31–10.16)

## 2024-03-16 PROCEDURE — 93005 ELECTROCARDIOGRAM TRACING: CPT

## 2024-03-16 PROCEDURE — 71045 X-RAY EXAM CHEST 1 VIEW: CPT

## 2024-03-16 PROCEDURE — 99285 EMERGENCY DEPT VISIT HI MDM: CPT

## 2024-03-16 PROCEDURE — 85025 COMPLETE CBC W/AUTO DIFF WBC: CPT | Performed by: EMERGENCY MEDICINE

## 2024-03-16 PROCEDURE — 99284 EMERGENCY DEPT VISIT MOD MDM: CPT | Performed by: EMERGENCY MEDICINE

## 2024-03-16 PROCEDURE — 83735 ASSAY OF MAGNESIUM: CPT | Performed by: EMERGENCY MEDICINE

## 2024-03-16 PROCEDURE — 80053 COMPREHEN METABOLIC PANEL: CPT | Performed by: EMERGENCY MEDICINE

## 2024-03-16 PROCEDURE — 36415 COLL VENOUS BLD VENIPUNCTURE: CPT | Performed by: EMERGENCY MEDICINE

## 2024-03-16 PROCEDURE — 83690 ASSAY OF LIPASE: CPT | Performed by: EMERGENCY MEDICINE

## 2024-03-16 PROCEDURE — 84484 ASSAY OF TROPONIN QUANT: CPT | Performed by: EMERGENCY MEDICINE

## 2024-03-16 PROCEDURE — 85379 FIBRIN DEGRADATION QUANT: CPT | Performed by: EMERGENCY MEDICINE

## 2024-03-17 VITALS
RESPIRATION RATE: 14 BRPM | OXYGEN SATURATION: 94 % | WEIGHT: 241.4 LBS | DIASTOLIC BLOOD PRESSURE: 63 MMHG | BODY MASS INDEX: 37.81 KG/M2 | TEMPERATURE: 97.6 F | HEART RATE: 47 BPM | SYSTOLIC BLOOD PRESSURE: 141 MMHG

## 2024-03-17 LAB
2HR DELTA HS TROPONIN: 0 NG/L
CARDIAC TROPONIN I PNL SERPL HS: 6 NG/L

## 2024-03-17 PROCEDURE — 84484 ASSAY OF TROPONIN QUANT: CPT | Performed by: EMERGENCY MEDICINE

## 2024-03-17 PROCEDURE — 36415 COLL VENOUS BLD VENIPUNCTURE: CPT | Performed by: EMERGENCY MEDICINE

## 2024-03-17 PROCEDURE — 93005 ELECTROCARDIOGRAM TRACING: CPT

## 2024-03-17 NOTE — ED PROVIDER NOTES
History  Chief Complaint   Patient presents with    Chest Pain     Intermittent chest pain since yesterday,  took two ntg at 11am with relief but it came back     63-year-old male presents with left-sided chest pain which she describes as sharp continuous for the past few hours he has a 2 cardiac stents in place from a history of CAD, currently not on any double antiplatelet therapy he only takes aspirin he denies any shortness of breath pleurisy cough congestion leg swelling or any other symptoms.  No nausea or diaphoresis no radiation of the pain.  Last stress testing for the heart was less than a year ago which was negative he said.      History provided by:  Patient   used: No        Prior to Admission Medications   Prescriptions Last Dose Informant Patient Reported? Taking?   Blood Glucose Monitoring Suppl (ONE TOUCH ULTRA MINI) w/Device KIT   Yes No   Empagliflozin (Jardiance) 25 MG TABS   Yes No   Sig: Take 25 mg by mouth in the morning     ONE TOUCH ULTRA TEST test strip   Yes No   Sig: TEST twice a day   ONETOUCH DELICA LANCETS FINE MISC   Yes No   Sig: TEST twice a day   aspirin 81 mg chewable tablet   Yes No   Sig: Chew 81 mg daily at bedtime    atorvastatin (LIPITOR) 40 mg tablet   Yes No   Sig: Take 40 mg by mouth daily at bedtime   cetirizine (ZyrTEC) 10 mg tablet   Yes No   Sig: Take 10 mg by mouth daily   Patient not taking: Reported on 2/29/2024   famotidine (PEPCID) 20 mg tablet   No No   Sig: Take 1 tablet (20 mg total) by mouth 2 (two) times a day   Patient not taking: Reported on 2/29/2024   gatifloxacin (ZYMAXID) 0.5 %   No No   Sig: Administer 1 drop to the right eye 2 (two) times a day   Patient not taking: Reported on 2/29/2024   ketorolac (ACULAR) 0.5 % ophthalmic solution   No No   Sig: Administer 1 drop to the right eye 4 (four) times a day   Patient not taking: Reported on 2/29/2024   losartan (COZAAR) 25 mg tablet   Yes No   Sig: Take 25 mg by mouth daily at bedtime     nitroglycerin (NITROSTAT) 0.4 mg SL tablet 3/16/2024 at 1100  Yes Yes   Sig: Place 0.4 mg under the tongue every 5 (five) minutes as needed for chest pain   psyllium (METAMUCIL) 58.6 % packet   Yes No   Sig: Take 1 packet by mouth daily   sertraline (Zoloft) 50 mg tablet   No No   Sig: Take 0.5 tablets (25 mg total) by mouth daily for 14 days, THEN 1 tablet (50 mg total) daily for 14 days.   sotalol (BETAPACE) 80 mg tablet   Yes No   Si mg 2 (two) times a day        Facility-Administered Medications: None       Past Medical History:   Diagnosis Date    BPH (benign prostatic hyperplasia)     CAD (coronary artery disease)     Claustrophobia     Colon polyp     Depression     Diabetes mellitus (HCC)     Expressive aphasia     only after anesthesia lasts about one hour    Hyperlipidemia     Hypertension     Migraine     Hx none since     Orthostatic hypotension     Shortness of breath     exertional SOB    Sleep apnea     [place] not wear mask ,ill fitting and noise made it impossible to wear       Past Surgical History:   Procedure Laterality Date    CARDIAC LOOP RECORDER      COLONOSCOPY      CORONARY STENT PLACEMENT N/A mar and oct 2017    x2    MI XCAPSL CTRC RMVL INSJ IO LENS PROSTH W/O ECP Left 2020    Procedure: EXTRACTION EXTRACAPSULAR CATARACT PHACO INTRAOCULAR LENS (IOL);  Surgeon: Santiago Rolle MD;  Location: [facility] MAIN OR;  Service: Ophthalmology    MI XCAPSL CTRC RMVL INSJ IO LENS PROSTH W/O ECP Right 3/14/2022    Procedure: EXTRACTION EXTRACAPSULAR CATARACT PHACO INTRAOCULAR LENS (IOL);  Surgeon: Santiago Rolle MD;  Location: [facility] MAIN OR;  Service: Ophthalmology       Family History   Problem Relation Age of Onset    Colon cancer Maternal Grandmother     Breast cancer Mother     Other Mother     Heart Valve Disease Father     Leukemia Father     Migraines Sister     Heart failure Family      I have reviewed and agree with the history as documented.    E-Cigarette/Vaping    E-Cigarette Use  Never User      E-Cigarette/Vaping Substances     Social History     Tobacco Use    Smoking status: Some Days     Types: Cigars     Start date: 8/21/1992    Smokeless tobacco: Never    Tobacco comments:     2 a day  was 6 a day for 6 months only   Vaping Use    Vaping status: Never Used   Substance Use Topics    Alcohol use: Yes     Comment: 5 beers a week    Drug use: No       Review of Systems   Constitutional: Negative.    HENT: Negative.     Eyes: Negative.    Respiratory: Negative.     Cardiovascular:  Positive for chest pain.   Gastrointestinal: Negative.    Endocrine: Negative.    Genitourinary: Negative.    Musculoskeletal: Negative.    Skin: Negative.    Allergic/Immunologic: Negative.    Neurological: Negative.    Hematological: Negative.    Psychiatric/Behavioral: Negative.     All other systems reviewed and are negative.      Physical Exam  Physical Exam  Vitals and nursing note reviewed. Exam conducted with a chaperone present.   Constitutional:       Appearance: Normal appearance. He is well-developed.   HENT:      Head: Normocephalic and atraumatic.      Nose: Nose normal.      Mouth/Throat:      Mouth: Mucous membranes are moist.   Eyes:      Extraocular Movements: Extraocular movements intact.      Pupils: Pupils are equal, round, and reactive to light.   Cardiovascular:      Rate and Rhythm: Normal rate and regular rhythm.      Pulses: Normal pulses.      Heart sounds: Normal heart sounds.   Pulmonary:      Effort: Pulmonary effort is normal.      Breath sounds: Normal breath sounds.   Abdominal:      General: Abdomen is flat. Bowel sounds are normal. There is no distension.      Palpations: Abdomen is soft. There is no mass.      Tenderness: There is no abdominal tenderness. There is no right CVA tenderness, left CVA tenderness, guarding or rebound.      Hernia: No hernia is present.   Genitourinary:     Penis: Normal.       Testes: Normal.   Musculoskeletal:         General: Normal range of  motion.      Cervical back: Normal range of motion and neck supple.   Skin:     General: Skin is warm and dry.      Capillary Refill: Capillary refill takes less than 2 seconds.   Neurological:      General: No focal deficit present.      Mental Status: He is alert and oriented to person, place, and time. Mental status is at baseline.   Psychiatric:         Mood and Affect: Mood normal.         Thought Content: Thought content normal.         Vital Signs  ED Triage Vitals [03/16/24 2206]   Temperature Pulse Respirations Blood Pressure SpO2   97.6 °F (36.4 °C) 55 (!) 24 (!) 177/79 99 %      Temp Source Heart Rate Source Patient Position - Orthostatic VS BP Location FiO2 (%)   Tympanic Monitor Sitting Right arm --      Pain Score       3           Vitals:    03/16/24 2245 03/16/24 2330 03/17/24 0000 03/17/24 0030   BP: 160/71 126/61 137/69 136/65   Pulse: (!) 53 (!) 50 (!) 53 (!) 53   Patient Position - Orthostatic VS: Lying Lying Lying Lying         Visual Acuity      ED Medications  Medications - No data to display    Diagnostic Studies  Results Reviewed       Procedure Component Value Units Date/Time    HS Troponin I 2hr [[id number]] Collected: 03/17/24 0036    Lab Status: In process Specimen: Blood from Arm, Left Updated: 03/17/24 0040    HS Troponin 0hr (reflex protocol) [[id number]]  (Normal) Collected: 03/16/24 2237    Lab Status: Final result Specimen: Blood from Arm, Left Updated: 03/16/24 2305     hs TnI 0hr 6 ng/L     Comprehensive metabolic panel [[id number]]  (Abnormal) Collected: 03/16/24 2237    Lab Status: Final result Specimen: Blood from Arm, Left Updated: 03/16/24 2301     Sodium 136 mmol/L      Potassium 4.0 mmol/L      Chloride 102 mmol/L      CO2 29 mmol/L      ANION GAP 5 mmol/L      BUN 23 mg/dL      Creatinine 0.90 mg/dL      Glucose 153 mg/dL      Calcium 9.4 mg/dL      AST 17 U/L      ALT 30 U/L      Alkaline Phosphatase 60 U/L      Total Protein 6.8 g/dL      Albumin 3.9 g/dL      Total  Bilirubin 0.59 mg/dL      eGFR 89 ml/min/1.73sq m     Narrative:      National Kidney Disease Foundation guidelines for Chronic Kidney Disease (CKD):     Stage 1 with normal or high GFR (GFR > 90 mL/min/1.73 square meters)    Stage 2 Mild CKD (GFR = 60-89 mL/min/1.73 square meters)    Stage 3A Moderate CKD (GFR = 45-59 mL/min/1.73 square meters)    Stage 3B Moderate CKD (GFR = 30-44 mL/min/1.73 square meters)    Stage 4 Severe CKD (GFR = 15-29 mL/min/1.73 square meters)    Stage 5 End Stage CKD (GFR <15 mL/min/1.73 square meters)  Note: GFR calculation is accurate only with a steady state creatinine    Magnesium [[id number]]  (Normal) Collected: 03/16/24 2237    Lab Status: Final result Specimen: Blood from Arm, Left Updated: 03/16/24 2301     Magnesium 1.9 mg/dL     Lipase [[id number]]  (Normal) Collected: 03/16/24 2237    Lab Status: Final result Specimen: Blood from Arm, Left Updated: 03/16/24 2301     Lipase 26 u/L     D-Dimer [[id number]]  (Normal) Collected: 03/16/24 2237    Lab Status: Final result Specimen: Blood from Arm, Left Updated: 03/16/24 2300     D-Dimer, Quant <0.27 ug/ml FEU     Narrative:      In the evaluation for possible pulmonary embolism, in the appropriate (Well's Score of 4 or less) patient, the age adjusted d-dimer cutoff for this patient can be calculated as:    Age x 0.01 (in ug/mL) for Age-adjusted D-dimer exclusion threshold for a patient over 50 years.    CBC and differential [[id number]]  (Abnormal) Collected: 03/16/24 2237    Lab Status: Final result Specimen: Blood from Arm, Left Updated: 03/16/24 2244     WBC 11.86 Thousand/uL      RBC 5.27 Million/uL      Hemoglobin 16.4 g/dL      Hematocrit 47.0 %      MCV 89 fL      MCH 31.1 pg      MCHC 34.9 g/dL      RDW 12.0 %      MPV 10.7 fL      Platelets 207 Thousands/uL      nRBC 0 /100 WBCs      Neutrophils Relative 59 %      Immature Grans % 0 %      Lymphocytes Relative 28 %      Monocytes Relative 10 %      Eosinophils Relative 2 %       Basophils Relative 1 %      Neutrophils Absolute 7.13 Thousands/µL      Absolute Immature Grans 0.02 Thousand/uL      Absolute Lymphocytes 3.33 Thousands/µL      Absolute Monocytes 1.14 Thousand/µL      Eosinophils Absolute 0.18 Thousand/µL      Basophils Absolute 0.06 Thousands/µL                    XR chest 1 view portable    (Results Pending)              Procedures  ECG 12 Lead Documentation Only    Date/Time: 3/17/2024 12:56 AM    Performed by: Carly Groves DO  Authorized by: Carly Groves DO    ECG reviewed by me, the ED Provider: yes    Patient location:  ED  Previous ECG:     Previous ECG:  Unavailable    Comparison to cardiac monitor: Yes    Interpretation:     Interpretation: non-specific    Rate:     ECG rate assessment: normal    Rhythm:     Rhythm: sinus rhythm    Ectopy:     Ectopy: none    QRS:     QRS axis:  Normal  Conduction:     Conduction: normal    ST segments:     ST segments:  Non-specific  T waves:     T waves: non-specific             ED Course             HEART Risk Score      Flowsheet Row Most Recent Value   Heart Score Risk Calculator    History 1 Filed at: 03/17/2024 0057   ECG 1 Filed at: 03/17/2024 0057   Age 2 Filed at: 03/17/2024 0057   Risk Factors 2 Filed at: 03/17/2024 0057   Troponin 0 Filed at: 03/17/2024 0057   HEART Score 6 Filed at: 03/17/2024 0057                                        Medical Decision Making  Patient's heart score is 6 keeping in mind that his 2 troponins are negative his chest pain has resolved EKG 2 of them are unremarkable.  He was set up for outpatient stress testing with his cardiologist Dr. Mark was not part of [facility] I also gave him stress test outpatient prescription so he can get it done and the results can be forwarded to his cardiologist he verbalized understanding had no further questions at time of discharge    Amount and/or Complexity of Data Reviewed  External Data Reviewed: notes.  Labs: ordered. Decision-making details  documented in ED Course.  Radiology: ordered and independent interpretation performed. Decision-making details documented in ED Course.     Details: No acute disease  ECG/medicine tests: ordered and independent interpretation performed. Decision-making details documented in ED Course.             Disposition  Final diagnoses:   Chest pain     Time reflects when diagnosis was documented in both MDM as applicable and the Disposition within this note       Time User Action Codes Description Comment    3/17/2024 12:59 AM Carly Groves [R07.9] Chest pain           ED Disposition       ED Disposition   Discharge    Condition   Stable    Date/Time   Sun Mar 17, 2024 0059    Comment   Spencer Vicente discharge to home/self care.                   Follow-up Information       Follow up With Specialties Details Why Contact Info    David Potter DO Family Medicine Schedule an appointment as soon as possible for a visit   [address] [place] [id number] [phone]      Keaton Lyons MD Cardiology   [address] [address]  [phone]              Patient's Medications   Discharge Prescriptions    No medications on file       Outpatient Discharge Orders   Stress test only, exercise   Standing Status: Future Standing Exp. Date: 03/17/28       PDMP Review       None            ED Provider  Electronically Signed by             Carly Groves DO  03/17/24 0101

## 2024-03-17 NOTE — DISCHARGE INSTRUCTIONS
Call the scheduling line at [phone] to schedule the stress test  Call your cardiologist on Monday  Return for worsening symptoms of chest pain shortness of breath to the ED

## 2024-03-18 LAB
ATRIAL RATE: 47 BPM
ATRIAL RATE: 50 BPM
P AXIS: 38 DEGREES
P AXIS: 53 DEGREES
PR INTERVAL: 168 MS
PR INTERVAL: 170 MS
QRS AXIS: 49 DEGREES
QRS AXIS: 51 DEGREES
QRSD INTERVAL: 102 MS
QRSD INTERVAL: 104 MS
QT INTERVAL: 476 MS
QT INTERVAL: 478 MS
QTC INTERVAL: 423 MS
QTC INTERVAL: 433 MS
T WAVE AXIS: 54 DEGREES
T WAVE AXIS: 63 DEGREES
VENTRICULAR RATE: 47 BPM
VENTRICULAR RATE: 50 BPM

## 2024-03-18 PROCEDURE — 93010 ELECTROCARDIOGRAM REPORT: CPT | Performed by: INTERNAL MEDICINE

## 2024-03-28 ENCOUNTER — OFFICE VISIT (OUTPATIENT)
Dept: PSYCHIATRY | Facility: CLINIC | Age: 64
End: 2024-03-28
Payer: COMMERCIAL

## 2024-03-28 DIAGNOSIS — F41.1 GAD (GENERALIZED ANXIETY DISORDER): Primary | ICD-10-CM

## 2024-03-28 DIAGNOSIS — F42.3 HOARDING DISORDER: ICD-10-CM

## 2024-03-28 PROCEDURE — G2211 COMPLEX E/M VISIT ADD ON: HCPCS | Performed by: PHYSICIAN ASSISTANT

## 2024-03-28 PROCEDURE — 99214 OFFICE O/P EST MOD 30 MIN: CPT | Performed by: PHYSICIAN ASSISTANT

## 2024-03-28 RX ORDER — ISOSORBIDE MONONITRATE 30 MG/1
30 TABLET, EXTENDED RELEASE ORAL DAILY
COMMUNITY
Start: 2024-03-18

## 2024-03-28 NOTE — PSYCH
"This note was not shared with the patient due to reasonable likelihood of causing patient harm    PROGRESS NOTE        [facility]      Name and Date of Birth:  Spencer Vicente 63 y.o. 1960    Reason for visit:   Chief Complaint   Patient presents with    Follow-up    Medication Management       Date of Visit: 03/28/24    SUBJECTIVE:    Spencer Vicente is a sabino 63 y.o. male with a history of Generalized Anxiety Disorder and OCD who presents today for follow-up and medication management. Since his last visit he started on the sertraline and is now on 50  mg qd. He tolerates this well with no notable SE. He feels this has been very helpful for his anxiety and motivation. He states he has actually started picking up his home and putting things away (reports some \"hoarding\" tendencies). He also notes that he does have a mildly decreased sex drive which he is taking as a good thing given his behavior in the past. He does note an episode of anorgasmia since starting on the medication which he states \"I'm not saying is bad but just make a note of it\".     He denies any suicidal ideation, intent or plan at present, denies any homicidal ideation, intent or plan at present.  He denies any auditory hallucinations, denies any visual hallucinations, denies any delusions.  He denies any side effects from current psychiatric medications.    HPI ROS Appetite Changes and Sleep: normal appetite, normal sleep    Review Of Systems:    Mood Normal   Behavior Normal    Thought Content Normal   General Normal    Personality Normal   Other Psych Symptoms Normal   Constitutional as noted in HPI   ENT as noted in HPI   Cardiovascular as noted in HPI   Respiratory as noted in HPI   Gastrointestinal as noted in HPI   Genitourinary as noted in HPI   Musculoskeletal as noted in HPI   Integumentary as noted in HPI   Neurological as noted in HPI   Endocrine negative   Other Symptoms none, all " other systems are negative       Laboratory Results: No results found for this or any previous visit.    Substance Abuse History:    Social History     Substance and Sexual Activity   Drug Use No       Family Psychiatric History:     Family History   Problem Relation Age of Onset    Colon cancer Maternal Grandmother     Breast cancer Mother     Other Mother     Heart Valve Disease Father     Leukemia Father     Migraines Sister     Heart failure Family        The following portions of the patient's history were reviewed and updated as appropriate: past family history, past medical history, past social history, past surgical history and problem list.    Social History     Socioeconomic History    Marital status: Single     Spouse name: Not on file    Number of children: Not on file    Years of education: Not on file    Highest education level: Not on file   Occupational History    Not on file   Tobacco Use    Smoking status: Some Days     Types: Cigars     Start date: 8/21/1992    Smokeless tobacco: Never    Tobacco comments:     2 a day  was 6 a day for 6 months only   Vaping Use    Vaping status: Never Used   Substance and Sexual Activity    Alcohol use: Yes     Comment: 5 beers a week    Drug use: No    Sexual activity: Not on file   Other Topics Concern    Not on file   Social History Narrative    Not on file     Social Determinants of Health     Financial Resource Strain: Medium Risk (8/21/2019)    Overall Financial Resource Strain (CARDIA)     Difficulty of Paying Living Expenses: Somewhat hard   Food Insecurity: Not on file   Transportation Needs: No Transportation Needs (8/21/2019)    PRAPARE - Transportation     Lack of Transportation (Medical): No     Lack of Transportation (Non-Medical): No   Physical Activity: Not on file   Stress: Not on file   Social Connections: Not on file   Intimate Partner Violence: Not on file   Housing Stability: Not on file     Social History     Social History Narrative    Not  on file        Social History       Tobacco History       Smoking Status  Some Days Smoking Start Date  8/21/1992 Smoking Tobacco Type  Cigars since 8/21/1992      Smokeless Tobacco Use  Never      Tobacco Comments  2 a day  was 6 a day for 6 months only              Alcohol History       Alcohol Use Status  Yes Comment  5 beers a week              Drug Use       Drug Use Status  No              Sexual Activity       Sexually Active  Not Asked              Activities of Daily Living    Not Asked                       OBJECTIVE:     Mental Status Evaluation:  Appearance:  casually dressed, adequate grooming, looks stated age, overweight, bearded   Behavior:  pleasant, cooperative, interacts appropriately with this writer   Speech:  normal rate, normal volume, normal pitch   Mood:  euthymic   Affect:  mood-congruent   Thought Process:  logical, coherent   Associations: intact associations   Thought Content:  mno paranoia noted on exam today   Perceptual Disturbances: no auditory hallucinations, no visual hallucinations   Risk Potential: Suicidal ideation - None  Homicidal ideation - None  Potential for aggression - No   Sensorium:  oriented to person, place, and time/date   Memory:  recent and remote memory grossly intact   Consciousness:  alert and awake   Attention/Concentration: attention span and concentration are age appropriate   Insight:  age appropriate   Judgment: age appropriate   Gait/Station: normal gait/station   Motor Activity: no abnormal movements     Assessment/Plan:     We discussed their current treatment plan in detail today. Thankfully he is doing better with the sertraline on board and he reports reduced anxiety and more motivation. I have advised continuing on this dose unchanged. He is court mandated to see a therapist and a psychiatrist (court mandated therapist is Manish Prince LCSW). He will continue to work with Spencer Waters, PhD, ERIN, for therapy as well. We have discussed their safety  plan and pt agrees that if they experience unsafe thoughts that they will reach out to their supports including this office, the suicide hotline, and emergency services if necessary. Patient is aware of non-emergent and emergent mental health resources. He is able to contract for their own safety at this time.    Will follow up in 1 months. Patient is aware to call the office if questions or concerns arise sooner.       Diagnoses and all orders for this visit:    JUAN (generalized anxiety disorder)  -     sertraline (Zoloft) 50 mg tablet; Take 1 tablet (50 mg total) by mouth daily    Hoarding disorder    Other orders  -     isosorbide mononitrate (IMDUR) 30 mg 24 hr tablet; Take 30 mg by mouth daily          Treatment Recommendations/Precautions:    Continue current medications:     - sertraline 50 mg qd    Risks/Benefits      Risks, Benefits And Possible Side Effects Of Medications:    Risks, benefits, and possible side effects of medications explained to patient and patient verbalizes understanding and agreement for treatment.    Controlled Medication Discussion:     Not applicable    Psychotherapy Provided:     Individual psychotherapy provided: No    Visit Start Time:  1:30 PM  Visit End Time:  1:50 PM  Total Visit Duration: 20 minutes    Silke Saldivar PA-C

## 2024-04-25 ENCOUNTER — OFFICE VISIT (OUTPATIENT)
Dept: PSYCHIATRY | Facility: CLINIC | Age: 64
End: 2024-04-25
Payer: COMMERCIAL

## 2024-04-25 DIAGNOSIS — F42.3 HOARDING DISORDER: ICD-10-CM

## 2024-04-25 DIAGNOSIS — F41.1 GAD (GENERALIZED ANXIETY DISORDER): Primary | ICD-10-CM

## 2024-04-25 PROCEDURE — 99214 OFFICE O/P EST MOD 30 MIN: CPT | Performed by: PHYSICIAN ASSISTANT

## 2024-04-25 PROCEDURE — G2211 COMPLEX E/M VISIT ADD ON: HCPCS | Performed by: PHYSICIAN ASSISTANT

## 2024-04-25 NOTE — BH CRISIS PLAN
Client Name: Spencer Vicente       Client YOB: 1960    [facility] Safety Plan      Creation Date: 4/25/24 Update Date: 4/25/24   Created By: Silke Saldivar PA-C Last Updated By: Silke Saldivar PA-C      Step 1: Warning Signs:   Warning Signs   N/A            Step 2: Internal Coping Strategies:   Internal Coping Strategies   watching TV   throw myself into a project            Step 3: People and social settings that provide distraction:   Name Contact Information   Brannon (brother) phone            Step 4: People whom I can ask for help during a crisis:      Name Contact Information    Brannon (brother) phone      Step 5: Professionals or agencies I can contact during a crisis:      Clinican/Agency Name Phone Emergency Contact    [facility]      Spencer Waters, PhD, [facility]        Local Emergency Department Emergency Department Phone Emergency Department Address    [facility]      [facility]          Crisis Phone Numbers:   Suicide Prevention Lifeline: Call or Text  988 Crisis Text Line: Text HOME to 348-732   Please note: Some [facility] do not have a separate number for Child/Adolescent specific crisis. If your county is not listed under Child/Adolescent, please call the adult number for your county      Adult Crisis Numbers: Child/Adolescent Crisis Numbers   [facility]: [phone] [facility]: [phone]   [facility]: [phone] [facility]: [phone]   [facility]: [phone] [place]: [phone]   [facility]: [phone] [place]: [phone]   [place] [facility]: [phone]   [facility]: [phone]   [facility]: [phone]   [facility]: [phone] (daytime) [phone] (after hours, weekends, holidays)      Step 6: Making the environment safer (plan for lethal means safety):   Patient did not identify any lethal methods: Yes     Optional: What is most  important to me and worth living for?   My brother, my family     [facility] Safety Plan. Mica Hall and Manny Montana. Used with permission of the authors.

## 2024-04-25 NOTE — PSYCH
"This note was not shared with the patient due to reasonable likelihood of causing patient harm    PROGRESS NOTE        [facility]      Name and Date of Birth:  Spencer Vicente 63 y.o. 1960    Reason for visit:   Chief Complaint   Patient presents with    Follow-up    Medication Management       Date of Visit: 04/25/24    SUBJECTIVE:    Spencer Vicente is a sabino 63 y.o. male with a history of Generalized Anxiety Disorder and OCD who presents today for follow-up and medication management. He reports continued benefit from the sertraline in terms of mood, anxiety, and intrusive thoughts. He feels that the sertraline enables him to actually go through and \"clean\" the rooms that were messy previously. He continues to work with his therapists as well and feels that overall he is doing much better since he was arrested and is on parole. He states his parole office drops in twice a month unannounced to check on him and so far things have been good. He reports good sleep, good appetite, and good energy levels.     He denies any suicidal ideation, intent or plan at present, denies any homicidal ideation, intent or plan at present.  He denies any auditory hallucinations, denies any visual hallucinations, denies any delusions.  He denies any side effects from current psychiatric medications.    HPI ROS Appetite Changes and Sleep: normal appetite, normal sleep    Review Of Systems:    Mood Normal   Behavior Normal    Thought Content Normal   General Normal    Personality Normal   Other Psych Symptoms Normal   Constitutional as noted in HPI   ENT as noted in HPI   Cardiovascular as noted in HPI   Respiratory as noted in HPI   Gastrointestinal as noted in HPI   Genitourinary as noted in HPI   Musculoskeletal as noted in HPI   Integumentary as noted in HPI   Neurological as noted in HPI   Endocrine negative   Other Symptoms none, all other systems are negative       Laboratory " Results: No results found for this or any previous visit.    Substance Abuse History:    Social History     Substance and Sexual Activity   Drug Use No       Family Psychiatric History:     Family History   Problem Relation Age of Onset    Colon cancer Maternal Grandmother     Breast cancer Mother     Other Mother     Heart Valve Disease Father     Leukemia Father     Migraines Sister     Heart failure Family        The following portions of the patient's history were reviewed and updated as appropriate: past family history, past medical history, past social history, past surgical history and problem list.    Social History     Socioeconomic History    Marital status: Single     Spouse name: Not on file    Number of children: Not on file    Years of education: Not on file    Highest education level: Not on file   Occupational History    Not on file   Tobacco Use    Smoking status: Some Days     Types: Cigars     Start date: 8/21/1992    Smokeless tobacco: Never    Tobacco comments:     2 a day  was 6 a day for 6 months only   Vaping Use    Vaping status: Never Used   Substance and Sexual Activity    Alcohol use: Yes     Comment: 5 beers a week    Drug use: No    Sexual activity: Not on file   Other Topics Concern    Not on file   Social History Narrative    Not on file     Social Determinants of Health     Financial Resource Strain: Medium Risk (8/21/2019)    Overall Financial Resource Strain (CARDIA)     Difficulty of Paying Living Expenses: Somewhat hard   Food Insecurity: Not on file   Transportation Needs: No Transportation Needs (8/21/2019)    PRAPARE - Transportation     Lack of Transportation (Medical): No     Lack of Transportation (Non-Medical): No   Physical Activity: Not on file   Stress: Not on file   Social Connections: Not on file   Intimate Partner Violence: Not on file   Housing Stability: Not on file     Social History     Social History Narrative    Not on file        Social History       Tobacco  History       Smoking Status  Some Days Smoking Start Date  8/21/1992 Smoking Tobacco Type  Cigars since 8/21/1992      Smokeless Tobacco Use  Never      Tobacco Comments  2 a day  was 6 a day for 6 months only              Alcohol History       Alcohol Use Status  Yes Comment  5 beers a week              Drug Use       Drug Use Status  No              Sexual Activity       Sexually Active  Not Asked              Activities of Daily Living    Not Asked                       OBJECTIVE:     Mental Status Evaluation:  Appearance:  casually dressed, adequate grooming, looks stated age, overweight, bearded   Behavior:  pleasant, cooperative, interacts appropriately with this writer   Speech:  normal rate, normal volume, normal pitch   Mood:  euthymic   Affect:  mood-congruent   Thought Process:  logical, coherent   Associations: intact associations   Thought Content:  mno paranoia noted on exam today   Perceptual Disturbances: no auditory hallucinations, no visual hallucinations   Risk Potential: Suicidal ideation - None  Homicidal ideation - None  Potential for aggression - No   Sensorium:  oriented to person, place, and time/date   Memory:  recent and remote memory grossly intact   Consciousness:  alert and awake   Attention/Concentration: attention span and concentration are age appropriate   Insight:  age appropriate   Judgment: age appropriate   Gait/Station: normal gait/station   Motor Activity: no abnormal movements     Assessment/Plan:     We discussed their current treatment plan in detail today. He reports continuing to do well on the sertraline with significantly reduced anxiety, depression, and intrusive thoughts. I have advised remaining on his current regimen unchanged. He is court mandated to see a therapist and a psychiatrist (court mandated therapist is Manish Prince LCSW). He will continue to work with Spencer Waters, PhD, [facility], for therapy as well. We have discussed their safety plan and pt agrees that if  they experience unsafe thoughts that they will reach out to their supports including this office, the suicide hotline, and emergency services if necessary. Patient is aware of non-emergent and emergent mental health resources. He is able to contract for their own safety at this time.    Will follow up in 2 months. Patient is aware to call the office if questions or concerns arise sooner.       Diagnoses and all orders for this visit:    JUAN (generalized anxiety disorder)  -     sertraline (Zoloft) 50 mg tablet; Take 1 tablet (50 mg total) by mouth daily    Hoarding disorder          Treatment Recommendations/Precautions:    Continue current medications:     - sertraline 50 mg qd    Risks/Benefits      Risks, Benefits And Possible Side Effects Of Medications:    Risks, benefits, and possible side effects of medications explained to patient and patient verbalizes understanding and agreement for treatment.    Controlled Medication Discussion:     Not applicable    Psychotherapy Provided:     Individual psychotherapy provided: No    Visit Start Time:  10:30 AM  Visit End Time:  10:50 AM  Total Visit Duration: 20 minutes    Silke Saldivar PA-C

## 2024-04-25 NOTE — BH TREATMENT PLAN
TREATMENT PLAN (Medication Management Only)        [facility]    Name and Date of Birth:  Spencer Vicente 63 y.o. 1960  Date of Treatment Plan: April 25, 2024  Diagnosis/Diagnoses:    1. JUAN (generalized anxiety disorder)    2. Hoarding disorder      Strengths/Personal Resources for Self-Care: taking medications as prescribed, motivation for treatment, willingness to work on problems.  Area/Areas of need (in own words): anxiety, obsessive-compulsive symptoms  1. Long Term Goal: decrease anxiety.  Target Date:6 months - 10/25/2024  Person/Persons responsible for completion of goal: Spencer  2.  Short Term Objective (s) - How will we reach this goal?:   A. Provider new recommended medication/dosage changes and/or continue medication(s): continue current medications as prescribed.  B. Attend psychotherapy regularly.  C. N/A.  Target Date:6 months - 10/25/2024  Person/Persons Responsible for Completion of Goal: Spencer  Progress Towards Goals: continuing treatment  Treatment Modality: medication management every 2 months  Review due 180 days from date of this plan: 6 months - 10/25/2024  Expected length of service: ongoing treatment  My Physician/PA/NP and I have developed this plan together and I agree to work on the goals and objectives. I understand the treatment goals that were developed for my treatment.

## 2024-06-24 ENCOUNTER — OFFICE VISIT (OUTPATIENT)
Dept: PSYCHIATRY | Facility: CLINIC | Age: 64
End: 2024-06-24
Payer: COMMERCIAL

## 2024-06-24 DIAGNOSIS — F42.3 HOARDING DISORDER: ICD-10-CM

## 2024-06-24 DIAGNOSIS — F41.1 GAD (GENERALIZED ANXIETY DISORDER): Primary | ICD-10-CM

## 2024-06-24 PROCEDURE — G2211 COMPLEX E/M VISIT ADD ON: HCPCS | Performed by: PHYSICIAN ASSISTANT

## 2024-06-24 PROCEDURE — 99214 OFFICE O/P EST MOD 30 MIN: CPT | Performed by: PHYSICIAN ASSISTANT

## 2024-06-24 RX ORDER — CIPROFLOXACIN 500 MG/1
500 TABLET, FILM COATED ORAL 2 TIMES DAILY
COMMUNITY
Start: 2024-06-13

## 2024-06-24 NOTE — PSYCH
This note was not shared with the patient due to reasonable likelihood of causing patient harm    PROGRESS NOTE        [facility]      Name and Date of Birth:  Spencer Vicente 63 y.o. 1960 MRN: [id number]    Insurance: [facility]or: [facility] REP / Plan: [employer]AGE  REP / Product Type: Medicare PPO /     Date of Visit: June 24, 2024    Reason for Visit:   Chief Complaint   Patient presents with    Follow-up    Medication Management       SUBJECTIVE:    Spencer Vicente is a sabino 63 y.o. male with a history of Generalized Anxiety Disorder and OCD who presents today for follow-up and medication management. Since his last visit he reports he has been well with no changes. He still feels the sertraline is helping to control the compulsive sexual thoughts and urges and this has allowed him to focus on other interests and passions. 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 He still feels it is helping him with the hoarding as well and he has been able to continue to organize and clean his house appropriately. He does not feel that he is getting very far with the court ordered therapy but does feel that his other therapist is very helpful.     He denies any suicidal ideation, intent or plan at present; denies any homicidal ideation, intent or plan at present.    He denies any auditory hallucinations, denies any auditory hallucinations, denies any delusions.    He denies any side effects from current psychiatric medications.    HPI ROS Appetite Changes and Sleep:     He reports adequate number of sleep hours, adequate appetite, adequate energy level    Current Rating Scores:     None completed today.    Review Of Systems:    Mood euthymic   Behavior appropriate, cooperative, and calm   Thought Content normal   General normal    Personality no change in personality   Other Psych Symptoms normal    Constitutional as noted in HPI   ENT as noted in HPI   Cardiovascular as noted in HPI   Respiratory as noted in HPI   Gastrointestinal as noted in HPI   Genitourinary as noted in HPI   Musculoskeletal as noted in HPI   Integumentary as noted in HPI   Neurological as noted in HPI   Endocrine negative   Other Symptoms none, all other systems are negative     Family Psychiatric History:     Family History   Problem Relation Age of Onset    Colon cancer Maternal Grandmother     Breast cancer Mother     Other Mother     Heart Valve Disease Father     Leukemia Father     Migraines Sister     Heart failure Family        Social/Substance Abuse History:    Social History     Socioeconomic History    Marital status: Single     Spouse name: Not on file    Number of children: Not on file    Years of education: Not on file    Highest education level: Not on file   Occupational History    Not on file   Tobacco Use    Smoking status: Some Days     Types: Cigars     Start date: 8/21/1992    Smokeless tobacco: Never    Tobacco comments:     2 a day  was 6 a day for 6 months only   Vaping Use    Vaping status: Never Used   Substance and Sexual Activity    Alcohol use: Yes     Comment: 5 beers a week    Drug use: No    Sexual activity: Not on file   Other Topics Concern    Not on file   Social History Narrative    Not on file     Social Determinants of Health     Financial Resource Strain: Medium Risk (8/21/2019)    Overall Financial Resource Strain (CARDIA)     Difficulty of Paying Living Expenses: Somewhat hard   Food Insecurity: Not on file   Transportation Needs: No Transportation Needs (8/21/2019)    PRAPARE - Transportation     Lack of Transportation (Medical): No     Lack of Transportation (Non-Medical): No   Physical Activity: Not on file   Stress: Not on file   Social Connections: Not on file   Intimate Partner Violence: Not on file   Housing Stability: Not on file       The following portions of the patient's history were  reviewed and updated as appropriate: past family history, past medical history, past social history, past surgical history and problem list.    OBJECTIVE:     Mental Status Evaluation:  Appearance:  dressed appropriately, adequate grooming, looks stated age   Behavior:  pleasant, cooperative, calm, interacts appropriately with this writer   Speech:  normal rate, normal volume, normal pitch   Mood:  euthymic   Affect:  mood-congruent   Thought Process:  organized, logical, coherent   Associations: intact associations   Thought Content:  no overt delusions, no paranoia noted on exam   Perceptual Disturbances: no auditory hallucinations, no visual hallucinations   Risk Potential: Suicidal ideation - None  Homicidal ideation - None  Potential for aggression - No   Sensorium:  oriented to person, place, and time/date   Memory:  recent and remote memory grossly intact   Consciousness:  alert and awake   Attention/Concentration: attention span and concentration are age appropriate   Insight:  age appropriate   Judgment: age appropriate   Gait/Station: normal gait/station   Motor Activity: no abnormal movements     Laboratory Results: I have personally reviewed all pertinent laboratory/tests results    Suicide/Homicide Risk Assessment:    Risk of Harm to Self:  The following ratings are based on assessment at the time of the interview  Based on today's assessment, Spencer presents the following risk of harm to self: none    Risk of Harm to Others:  The following ratings are based on assessment at the time of the interview  Based on today's assessment, Spencer presents the following risk of harm to others: none    The following interventions are recommended: no intervention changes needed    Assessment/Plan:      Since his last visit he reports that the sertraline continues to work well for the anxiety as well as the intrusive thoughts and compulsions. I have advised no changes at this time. He is court mandated to see a therapist  and a psychiatrist (court mandated therapist is Manish Prince LCSW). He will continue to work with Spencer Waters, PhD, LPC, for therapy as well. We have discussed his safety plan and he agrees that if he experience unsafe thoughts that he will reach out to his supports including this office, the suicide hotline, and emergency services if necessary. Spencer is aware of non-emergent and emergent mental health resources. They are able to contract for their own safety at this time.    Will follow up in 2-3 months. Patient is aware to call the office if questions or concerns arise sooner.      Diagnoses and all orders for this visit:    JUAN (generalized anxiety disorder)    Hoarding disorder    Other orders  -     ciprofloxacin (CIPRO) 500 mg tablet; Take 500 mg by mouth 2 (two) times a day        Treatment Recommendations/Precautions:    Continue current medications:     - sertraline 50 mg qd    Medication management every 2 months  Continue psychotherapy with own therapist  Aware of 24 hour and weekend coverage for urgent situations accessed by calling [facility] main practice number  I am scheduling this patient out for greater than 3 months: No    Medications Risks/Benefits      Risks, Benefits And Possible Side Effects Of Medications:    Risks, benefits, and possible side effects of medications explained to Spencer and he verbalizes understanding and agreement for treatment.    Controlled Medication Discussion:     Not applicable    Psychotherapy Provided:     Individual psychotherapy provided: No    Treatment Plan:    Completed and signed during the session: Not applicable - Treatment Plan not due at this session    Visit Time    Visit Start Time:  11:00 AM  Visit End Time:  11:20 AM  Total Visit Duration:  20 minutes    Silke Saldivar 06/24/24

## 2024-08-12 ENCOUNTER — VBI (OUTPATIENT)
Dept: ADMINISTRATIVE | Facility: OTHER | Age: 64
End: 2024-08-12

## 2024-08-12 NOTE — TELEPHONE ENCOUNTER
08/12/24 12:20 PM     Chart reviewed for eGFR was/were submitted to the patient's insurance.     Lesa Matamoros   [facility]

## 2024-09-12 ENCOUNTER — OFFICE VISIT (OUTPATIENT)
Dept: PSYCHIATRY | Facility: CLINIC | Age: 64
End: 2024-09-12
Payer: COMMERCIAL

## 2024-09-12 DIAGNOSIS — F41.1 GAD (GENERALIZED ANXIETY DISORDER): Primary | ICD-10-CM

## 2024-09-12 DIAGNOSIS — F42.3 HOARDING DISORDER: ICD-10-CM

## 2024-09-12 PROCEDURE — 99214 OFFICE O/P EST MOD 30 MIN: CPT | Performed by: PHYSICIAN ASSISTANT

## 2024-09-12 PROCEDURE — G2211 COMPLEX E/M VISIT ADD ON: HCPCS | Performed by: PHYSICIAN ASSISTANT

## 2024-09-12 NOTE — PSYCH
This note was not shared with the patient due to reasonable likelihood of causing patient harm    PROGRESS NOTE        [facility]      Name and Date of Birth:  Spencer Vicente 63 y.o. 1960 MRN: [id number]    Insurance: Payor: [employer]  REP / Plan: [employer] MEDICARE ADVANTAGE  REP / Product Type: Medicare PPO /     Date of Visit: September 12, 2024    Reason for Visit:   Chief Complaint   Patient presents with    Follow-up    Medication Management     Assessment & Plan  JUAN (generalized anxiety disorder)  Improving but not at goal - increase sertraline to 75 mg qd         Hoarding disorder  Hoarding/OCD: Improving but not at goal - increase sertraline to 75 mg qd            SUBJECTIVE:    Spencer Vicente is a sabino 63 y.o. male with a history of Generalized Anxiety Disorder and OCD who presents today for follow-up and medication management. He reports that the sertraline is still very helpful with the sexual thoughts and compulsions. He has not paid anyone to come over to have intercourse which he used to do regularly. He is not in any relationships currently (sexual or otherwise per pt). He does report mild sexual SE with the sertraline but feels this is not bothersome at this time. He is experiencing more intrusive thoughts in regards to the safety of his family. He does report that he is more able to make eye contact with neighbors and is not so afraid of them or leaving the house anymore.     He denies any suicidal ideation, intent or plan at present; denies any homicidal ideation, intent or plan at present.    He denies any auditory hallucinations, denies any auditory hallucinations, denies any delusions.    He denies any side effects from current psychiatric medications.    HPI ROS Appetite Changes and Sleep:     He reports adequate number of sleep hours, adequate appetite, adequate energy level    Current Rating Scores:     Current PHQ-9   PHQ-2/9 Depression  Screening    Little interest or pleasure in doing things: 1 - several days  Feeling down, depressed, or hopeless: 1 - several days  Trouble falling or staying asleep, or sleeping too much: 1 - several days  Feeling tired or having little energy: 1 - several days  Poor appetite or overeatin - not at all  Feeling bad about yourself - or that you are a failure or have let yourself or your family down: 1 - several days  Trouble concentrating on things, such as reading the newspaper or watching television: 0 - not at all  Moving or speaking so slowly that other people could have noticed. Or the opposite - being so fidgety or restless that you have been moving around a lot more than usual: 0 - not at all  Thoughts that you would be better off dead, or of hurting yourself in some way: 0 - not at all  PHQ-9 Score: 5  PHQ-9 Interpretation: Mild depression         Review Of Systems:    Mood euthymic   Behavior appropriate, cooperative, and calm   Thought Content normal   General normal    Personality no change in personality   Other Psych Symptoms normal   Constitutional as noted in HPI   ENT as noted in HPI   Cardiovascular as noted in HPI   Respiratory as noted in HPI   Gastrointestinal as noted in HPI   Genitourinary as noted in HPI   Musculoskeletal as noted in HPI   Integumentary as noted in HPI   Neurological as noted in HPI   Endocrine negative   Other Symptoms none, all other systems are negative     Family Psychiatric History:     Family History   Problem Relation Age of Onset    Colon cancer Maternal Grandmother     Breast cancer Mother     Other Mother     Heart Valve Disease Father     Leukemia Father     Migraines Sister     Heart failure Family      Social/Substance Abuse History:    Social History     Socioeconomic History    Marital status: Single     Spouse name: Not on file    Number of children: Not on file    Years of education: Not on file    Highest education level: Not on file   Occupational History     Not on file   Tobacco Use    Smoking status: Some Days     Types: Cigars     Start date: 8/21/1992    Smokeless tobacco: Never    Tobacco comments:     2 a day  was 6 a day for 6 months only   Vaping Use    Vaping status: Never Used   Substance and Sexual Activity    Alcohol use: Yes     Comment: 5 beers a week    Drug use: No    Sexual activity: Not on file   Other Topics Concern    Not on file   Social History Narrative    Not on file     Social Determinants of Health     Financial Resource Strain: Medium Risk (8/21/2019)    Overall Financial Resource Strain (CARDIA)     Difficulty of Paying Living Expenses: Somewhat hard   Food Insecurity: Not on file   Transportation Needs: No Transportation Needs (8/21/2019)    PRAPARE - Transportation     Lack of Transportation (Medical): No     Lack of Transportation (Non-Medical): No   Physical Activity: Not on file   Stress: Not on file   Social Connections: Not on file   Intimate Partner Violence: Not on file   Housing Stability: Not on file     The following portions of the patient's history were reviewed and updated as appropriate: past family history, past medical history, past social history, past surgical history and problem list.    OBJECTIVE:     Mental Status Evaluation:  Appearance:  dressed appropriately, adequate grooming, looks stated age   Behavior:  pleasant, cooperative, calm, interacts appropriately with this writer   Speech:  normal rate, normal volume, normal pitch   Mood:  euthymic   Affect:  mood-congruent   Thought Process:  organized, logical, coherent   Associations: intact associations   Thought Content:  no overt delusions, no paranoia noted on exam   Perceptual Disturbances: no auditory hallucinations, no visual hallucinations   Risk Potential: Suicidal ideation - None  Homicidal ideation - None  Potential for aggression - No   Sensorium:  oriented to person, place, and time/date   Memory:  recent and remote memory grossly intact    Consciousness:  alert and awake   Attention/Concentration: attention span and concentration are age appropriate   Insight:  age appropriate   Judgment: age appropriate   Gait/Station: normal gait/station   Motor Activity: no abnormal movements     Laboratory Results: I have personally reviewed all pertinent laboratory/tests results    Suicide/Homicide Risk Assessment:    Risk of Harm to Self:  The following ratings are based on assessment at the time of the interview  Based on today's assessment, Spencer presents the following risk of harm to self: none    Risk of Harm to Others:  The following ratings are based on assessment at the time of the interview  Based on today's assessment, Spencer presents the following risk of harm to others: none    The following interventions are recommended: no intervention changes needed    Assessment/Plan:      He still feels the sertraline is working well but is describing more intrusive thoughts related to the safety of his family. I have advised increasing the sertraline to 75 mg qd. If the sexual SE worsen we could try adding buspirone to help manage them. He is court mandated to see a therapist and a psychiatrist (court mandated therapist is Manish Prince [facility]). He will continue to work with Spencer Waters, PhD, [facility], for therapy as well. We have discussed his safety plan and he agrees that if he experience unsafe thoughts that he will reach out to his supports including this office, the suicide hotline, and emergency services if necessary. Spencer is aware of non-emergent and emergent mental health resources. They are able to contract for their own safety at this time.    Will follow up in 1 months. Patient is aware to call the office if questions or concerns arise sooner.      Diagnoses and all orders for this visit:    JUAN (generalized anxiety disorder)    Hoarding disorder        Treatment Recommendations/Precautions:    Increase medications:     - sertraline 50 mg qd to 75 mg  qd    Aware of 24 hour and weekend coverage for urgent situations accessed by calling [facility] main practice number  Medication management every 1 months  Continue psychotherapy with own therapist  I am scheduling this patient out for greater than 3 months: No    Medications Risks/Benefits      Risks, Benefits And Possible Side Effects Of Medications:    Risks, benefits, and possible side effects of medications explained to Spencer and he verbalizes understanding and agreement for treatment.    Controlled Medication Discussion:     Not applicable    Psychotherapy Provided:     Individual psychotherapy provided: No    Treatment Plan:    Completed and signed during the session: Not applicable - Treatment Plan not due at this session    Visit Time    Visit Start Time:  1:35 PM  Visit End Time:  1:55 PM  Total Visit Duration:  20 minutes    Silke Saldivar 09/12/24

## 2024-10-07 ENCOUNTER — TELEPHONE (OUTPATIENT)
Age: 64
End: 2024-10-07

## 2024-10-07 NOTE — TELEPHONE ENCOUNTER
Spencer Vicente and/or patient requested a call back to discuss patient's mother is entering hospice. Patient would like to do a presentation at the [facility] when the time comes for her to depart. Patient was wondering if there is any medication that he could have that could help him through this. Patient states he is feeling on and off crying at times.     They can be reached at P# [phone].       Thank you.

## 2024-10-08 NOTE — TELEPHONE ENCOUNTER
Called Spencer back and reviewed. He reiterated that he wasn't looking for a long term medication, but was wondering if there was a pill he could take once to calm himself down to get through the eulogy when she passes. He said in regards to seeing a therapist more frequently, he is interested in this but he sees someone out of network and has to pay out of pocket cash because insurance doesn't cover it. Clinical will follow up as advised.

## 2024-10-08 NOTE — TELEPHONE ENCOUNTER
Called Spencer back. He said he has an appointment with his therapist on 10/17 and will discuss things with him then before asking to see Silke. He has an appointment on 11/1, I informed him to keep us updated on things and call scheduling department to get an earlier appointment as things start to map out with his Mom.

## 2024-10-28 DIAGNOSIS — F41.1 GAD (GENERALIZED ANXIETY DISORDER): ICD-10-CM

## 2024-10-31 ENCOUNTER — TELEPHONE (OUTPATIENT)
Dept: PSYCHIATRY | Facility: CLINIC | Age: 64
End: 2024-10-31

## 2024-10-31 ENCOUNTER — HOSPITAL ENCOUNTER (EMERGENCY)
Facility: HOSPITAL | Age: 64
Discharge: HOME | End: 2024-10-31
Attending: EMERGENCY MEDICINE
Payer: COMMERCIAL

## 2024-10-31 VITALS
DIASTOLIC BLOOD PRESSURE: 57 MMHG | WEIGHT: 260 LBS | BODY MASS INDEX: 40.81 KG/M2 | SYSTOLIC BLOOD PRESSURE: 115 MMHG | HEART RATE: 53 BPM | HEIGHT: 67 IN | RESPIRATION RATE: 18 BRPM | OXYGEN SATURATION: 96 % | TEMPERATURE: 98.2 F

## 2024-10-31 DIAGNOSIS — M54.50 LOW BACK PAIN: Primary | ICD-10-CM

## 2024-10-31 LAB
BACTERIA UR QL AUTO: ABNORMAL /HPF
BILIRUB UR QL STRIP: NEGATIVE
CLARITY UR: CLEAR
COLOR UR: YELLOW
GLUCOSE UR STRIP-MCNC: ABNORMAL MG/DL
HGB UR QL STRIP.AUTO: ABNORMAL
KETONES UR STRIP-MCNC: NEGATIVE MG/DL
LEUKOCYTE ESTERASE UR QL STRIP: NEGATIVE
MUCOUS THREADS UR QL AUTO: ABNORMAL
NITRITE UR QL STRIP: NEGATIVE
NON-SQ EPI CELLS URNS QL MICRO: ABNORMAL /HPF
PH UR STRIP.AUTO: 5 [PH]
PROT UR STRIP-MCNC: NEGATIVE MG/DL
RBC #/AREA URNS AUTO: ABNORMAL /HPF
SP GR UR STRIP.AUTO: 1.02 (ref 1–1.03)
UROBILINOGEN UR STRIP-ACNC: <2 MG/DL
WBC #/AREA URNS AUTO: ABNORMAL /HPF

## 2024-10-31 PROCEDURE — 81001 URINALYSIS AUTO W/SCOPE: CPT

## 2024-10-31 PROCEDURE — 99284 EMERGENCY DEPT VISIT MOD MDM: CPT

## 2024-10-31 PROCEDURE — 99283 EMERGENCY DEPT VISIT LOW MDM: CPT

## 2024-10-31 RX ORDER — LIDOCAINE 50 MG/G
1 PATCH TOPICAL ONCE
Status: DISCONTINUED | OUTPATIENT
Start: 2024-10-31 | End: 2024-10-31 | Stop reason: HOSPADM

## 2024-10-31 RX ORDER — ACETAMINOPHEN 325 MG/1
650 TABLET ORAL ONCE
Status: DISCONTINUED | OUTPATIENT
Start: 2024-10-31 | End: 2024-10-31 | Stop reason: HOSPADM

## 2024-10-31 RX ORDER — METHOCARBAMOL 500 MG/1
500 TABLET, FILM COATED ORAL 2 TIMES DAILY
Qty: 8 TABLET | Refills: 0 | Status: SHIPPED | OUTPATIENT
Start: 2024-10-31 | End: 2024-11-04

## 2024-10-31 RX ADMIN — LIDOCAINE 1 PATCH: 50 PATCH CUTANEOUS at 14:36

## 2024-10-31 NOTE — ED NOTES
Patient ambulated out of the ED, AAOx4 resp even and unlabored with no S$S of distress.       Alberta Pavon RN  10/31/24 5921

## 2024-10-31 NOTE — ED PROVIDER NOTES
Time reflects when diagnosis was documented in both MDM as applicable and the Disposition within this note       Time User Action Codes Description Comment    10/31/2024  3:50 PM Yeny Tova Add [M54.50] Low back pain           ED Disposition       ED Disposition   Discharge    Condition   Stable    Date/Time   Thu Oct 31, 2024  3:50 PM    Comment   Spencer Remingtonadore discharge to home/self care.                   Assessment & Plan       Medical Decision Making  Patient in no acute distress, afebrile. No lateralizing motor or sensory deficits. Ambulating without unsteady gait. The absence of trauma, neurological deficits, and red flag symptoms or presentation suggests that the differential diagnosis may include, but is not limited to, mechanical back pain such as acute musculoskeletal strain, muscle spasm, UTI Plan: Pain management, U/A.    Patient reports significant improvement after pain medication. No leuks/nitrates in UA. Recommended rest and avoidance of activities that exacerbate pain. Referral to [facility] provided. Robaxin send to pharmacy. Advised patient to change position slowly, avoid drinking alcohol, driving or operating heavy machinery while using the muscle relaxant. Patient verbalized understanding and is agreeable. Advised follow up with PCP and/or ED for re-evaluation if symptoms persist or if any red flags develop. Patient was educated on signs of concerning symptoms and instructed to return to the ED if any arise.    Risk  Prescription drug management.        ED Course as of 11/03/24 1308   Thu Oct 31, 2024   1524 Patient reports improvement in symptoms.       Medications - No data to display    ED Risk Strat Scores                           SBIRT 22yo+      Flowsheet Row Most Recent Value   Initial Alcohol Screen: US AUDIT-C     1. How often do you have a drink containing alcohol? 0 Filed at: 10/31/2024 1350   2. How many drinks containing alcohol do you have on a typical day  you are drinking?  0 Filed at: 10/31/2024 1350   3a. Male UNDER 65: How often do you have five or more drinks on one occasion? 0 Filed at: 10/31/2024 1350   3b. FEMALE Any Age, or MALE 65+: How often do you have 4 or more drinks on one occassion? 0 Filed at: 10/31/2024 1350   Audit-C Score 0 Filed at: 10/31/2024 1350   DARY: How many times in the past year have you...    Used an illegal drug or used a prescription medication for non-medical reasons? Never Filed at: 10/31/2024 1350                            History of Present Illness       Chief Complaint   Patient presents with    Back Pain     Lower back pain for 2 weeks after lifting a bag of cement        Past Medical History:   Diagnosis Date    BPH (benign prostatic hyperplasia)     CAD (coronary artery disease)     Claustrophobia     Colon polyp     Depression     Diabetes mellitus (HCC)     Expressive aphasia     only after anesthesia lasts about one hour    Hyperlipidemia     Hypertension     Migraine     Hx none since 2014    Orthostatic hypotension     Shortness of breath     exertional SOB    Sleep apnea     [place] not wear mask ,ill fitting and noise made it impossible to wear      Past Surgical History:   Procedure Laterality Date    CARDIAC LOOP RECORDER      COLONOSCOPY      CORONARY STENT PLACEMENT N/A mar and oct 2017    x2    VT XCAPSL CTRC RMVL INSJ IO LENS PROSTH W/O ECP Left 8/31/2020    Procedure: EXTRACTION EXTRACAPSULAR CATARACT PHACO INTRAOCULAR LENS (IOL);  Surgeon: Santiago Rolle MD;  Location: [facility] MAIN OR;  Service: Ophthalmology    VT XCAPSL CTRC RMVL INSJ IO LENS PROSTH W/O ECP Right 3/14/2022    Procedure: EXTRACTION EXTRACAPSULAR CATARACT PHACO INTRAOCULAR LENS (IOL);  Surgeon: Santaigo Rolle MD;  Location: [facility] MAIN OR;  Service: Ophthalmology      Family History   Problem Relation Age of Onset    Colon cancer Maternal Grandmother     Breast cancer Mother     Other Mother     Heart Valve Disease Father     Leukemia Father      "Migraines Sister     Heart failure Family       Social History     Tobacco Use    Smoking status: Some Days     Types: Cigars     Start date: 8/21/1992    Smokeless tobacco: Never    Tobacco comments:     2 a day  was 6 a day for 6 months only   Vaping Use    Vaping status: Never Used   Substance Use Topics    Alcohol use: Yes     Comment: 5 beers a week    Drug use: No      E-Cigarette/Vaping    E-Cigarette Use Never User       E-Cigarette/Vaping Substances      I have reviewed and agree with the history as documented.     Patient is a 63-year-old male with past medical history significant for JUAN, BPH, CAD, depression, diabetes, hyperlipidemia, hypertension, presenting to the emergency department for evaluation of left-sided back pain that has been ongoing for approximately 2 weeks. Patient reports that he was lifting a bag of cement about 2 weeks ago and reports he \"did not use proper lifting techniques.\"  Two days following the initial incident, patient reports helping a friend move and noticed the left-sided back pain. Since the incident, he has been experiencing dull pain in the left lower back with radiation to the left thigh that worsens with movement. Patient denies numbness, saddle anesthesia, leg weakness,  or GI changes, IV drug use, steroid use, spinal procedures, other trauma, bulging or hernias.        Back Pain  Associated symptoms: no abdominal pain, no chest pain, no fever, no numbness and no weakness        Review of Systems   Constitutional:  Negative for activity change, chills and fever.   Cardiovascular:  Negative for chest pain and leg swelling.   Gastrointestinal:  Negative for abdominal pain, constipation, diarrhea, nausea and vomiting.   Genitourinary:  Negative for decreased urine volume, difficulty urinating, scrotal swelling and testicular pain.   Musculoskeletal:  Positive for back pain. Negative for gait problem, myalgias, neck pain and neck stiffness.   Skin:  Negative for color " change, pallor and rash.   Neurological:  Negative for tremors, weakness and numbness.           Objective       ED Triage Vitals   Temperature Pulse Blood Pressure Respirations SpO2 Patient Position - Orthostatic VS   10/31/24 1350 10/31/24 1350 10/31/24 1352 10/31/24 1350 10/31/24 1350 10/31/24 1553   (!) 97 °F (36.1 °C) (!) 52 (!) 195/91 18 94 % Sitting      Temp Source Heart Rate Source BP Location FiO2 (%) Pain Score    10/31/24 1553 10/31/24 1553 10/31/24 1553 -- 10/31/24 1350    Oral Monitor Right arm  8      Vitals      Date and Time Temp Pulse SpO2 Resp BP Pain Score FACES Pain Rating User   10/31/24 1553 98.2 °F (36.8 °C) 53 96 % 18 115/57 4 0 AN   10/31/24 1352 -- -- -- -- 195/91 -- -- DARYL   10/31/24 1350 97 °F (36.1 °C) 52 94 % 18 -- 8 -- DARYL            Physical Exam  Vitals and nursing note reviewed.   Constitutional:       General: He is not in acute distress.     Appearance: Normal appearance. He is well-developed. He is not ill-appearing or toxic-appearing.   HENT:      Head: Normocephalic and atraumatic.   Eyes:      Conjunctiva/sclera: Conjunctivae normal.   Cardiovascular:      Rate and Rhythm: Normal rate and regular rhythm.   Pulmonary:      Effort: Pulmonary effort is normal. No respiratory distress.      Breath sounds: Normal breath sounds.   Abdominal:      Palpations: Abdomen is soft.      Tenderness: There is no abdominal tenderness. There is no right CVA tenderness or left CVA tenderness.   Musculoskeletal:         General: No swelling or deformity. Normal range of motion.      Cervical back: Normal, normal range of motion and neck supple. No rigidity or tenderness.      Thoracic back: Normal.      Lumbar back: Tenderness present. No swelling, deformity, signs of trauma, spasms or bony tenderness. Normal range of motion. Negative right straight leg raise test and negative left straight leg raise test.      Comments: Patient ambulated to hospital bed without assistance or difficulty. The mid  back appears normal in contour, with no visible deformities, swelling or discoloration. The patient exhibits normal posture, with no obvious guarding or asymmetry. Tenderness is noted over the left mid paraspinal muscles.  No tenderness over the spinous processes.  No palpable masses, step-offs or warmth are present. Strength in UE and LE 5/5, sensation intact, palpable pulses.     Skin:     General: Skin is warm and dry.      Capillary Refill: Capillary refill takes less than 2 seconds.      Findings: No rash.   Neurological:      General: No focal deficit present.      Mental Status: He is alert and oriented to person, place, and time. Mental status is at baseline.      Sensory: No sensory deficit.      Motor: No weakness.      Gait: Gait normal.   Psychiatric:         Mood and Affect: Mood normal.         Behavior: Behavior is cooperative.         Results Reviewed       Procedure Component Value Units Date/Time    Urine Microscopic [[id number]]  (Abnormal) Collected: 10/31/24 1435    Lab Status: Final result Specimen: Urine, Other Updated: 10/31/24 1543     RBC, UA 0-1 /hpf      WBC, UA 0-1 /hpf      Epithelial Cells Occasional /hpf      Bacteria, UA Occasional /hpf      MUCUS THREADS Occasional    UA w Reflex to Microscopic w Reflex to Culture [[id number]]  (Abnormal) Collected: 10/31/24 1435    Lab Status: Final result Specimen: Urine, Other Updated: 10/31/24 1451     Color, UA Yellow     Clarity, UA Clear     Specific Gravity, UA 1.020     pH, UA 5.0     Leukocytes, UA Negative     Nitrite, UA Negative     Protein, UA Negative mg/dl      Glucose, UA 1000 (1%) mg/dl      Ketones, UA Negative mg/dl      Urobilinogen, UA <2.0 mg/dl      Bilirubin, UA Negative     Occult Blood, UA Trace            No orders to display       Procedures    ED Medication and Procedure Management   Prior to Admission Medications   Prescriptions Last Dose Informant Patient Reported? Taking?   Blood Glucose Monitoring Suppl (ONE TOUCH  ULTRA MINI) w/Device KIT   Yes No   Empagliflozin (Jardiance) 25 MG TABS   Yes No   Sig: Take 25 mg by mouth in the morning     ONE TOUCH ULTRA TEST test strip   Yes No   Sig: TEST twice a day   ONETOUCH DELICA LANCETS FINE MISC   Yes No   Sig: TEST twice a day   aspirin 81 mg chewable tablet   Yes No   Sig: Chew 81 mg daily at bedtime    atorvastatin (LIPITOR) 40 mg tablet   Yes No   Sig: Take 40 mg by mouth daily at bedtime   isosorbide mononitrate (IMDUR) 30 mg 24 hr tablet   Yes No   Sig: Take 30 mg by mouth daily   losartan (COZAAR) 25 mg tablet   Yes No   Sig: Take 25 mg by mouth daily at bedtime    nitroglycerin (NITROSTAT) 0.4 mg SL tablet   Yes No   Sig: Place 0.4 mg under the tongue every 5 (five) minutes as needed for chest pain   psyllium (METAMUCIL) 58.6 % packet   Yes No   Sig: Take 1 packet by mouth daily   sertraline (ZOLOFT) 50 mg tablet   No No   Sig: TAKE 1 TABLET (50 MG TOTAL) BY MOUTH DAILY   sotalol (BETAPACE) 80 mg tablet   Yes No   Si mg 2 (two) times a day        Facility-Administered Medications: None     Discharge Medication List as of 10/31/2024  3:54 PM        START taking these medications    Details   methocarbamol (ROBAXIN) 500 mg tablet Take 1 tablet (500 mg total) by mouth 2 (two) times a day for 4 days, Starting Thu 10/31/2024, Until 2024, Normal           CONTINUE these medications which have NOT CHANGED    Details   aspirin 81 mg chewable tablet Chew 81 mg daily at bedtime , Starting Tue 3/20/2018, Historical Med      atorvastatin (LIPITOR) 40 mg tablet Take 40 mg by mouth daily at bedtime, Historical Med      Blood Glucose Monitoring Suppl (ONE TOUCH ULTRA MINI) w/Device KIT Starting Thu 5/10/2018, Historical Med      Empagliflozin (Jardiance) 25 MG TABS Take 25 mg by mouth in the morning  , Historical Med      isosorbide mononitrate (IMDUR) 30 mg 24 hr tablet Take 30 mg by mouth daily, Starting Mon 3/18/2024, Historical Med      losartan (COZAAR) 25 mg tablet Take  25 mg by mouth daily at bedtime , Historical Med      nitroglycerin (NITROSTAT) 0.4 mg SL tablet Place 0.4 mg under the tongue every 5 (five) minutes as needed for chest pain, Historical Med      ONE TOUCH ULTRA TEST test strip TEST twice a day, Historical Med      ONETOUCH DELICA LANCETS FINE MISC TEST twice a day, Historical Med      psyllium (METAMUCIL) 58.6 % packet Take 1 packet by mouth daily, Historical Med      sertraline (ZOLOFT) 50 mg tablet TAKE 1 TABLET (50 MG TOTAL) BY MOUTH DAILY, Starting Mon 10/28/2024, Until Sat 4/26/2025, Normal      sotalol (BETAPACE) 80 mg tablet 80 mg 2 (two) times a day  , Starting Wed 5/15/2019, Historical Med             ED SEPSIS DOCUMENTATION   Time reflects when diagnosis was documented in both MDM as applicable and the Disposition within this note       Time User Action Codes Description Comment    10/31/2024  3:50 PM Tova Saini Add [M54.50] Low back pain                  Tova Saini PA-C  11/03/24 8754

## 2024-10-31 NOTE — DISCHARGE INSTRUCTIONS
Please follow-up with your primary care provider in several days to ensure that your symptoms are improving.  Please follow-up with the comprehensive spine program, referral has been sent for you.  Please do not drink, drive or operate heavy machinery while using Robaxin.  Please get up slowly and take your time when changing positions.    Please return to the emergency department if you are experiencing worsening back pain, numbness, tingling, weakness in your extremities, changes to your bowel and urinary habits, fever, chills, difficulty ambulating or any new or concerning symptoms.

## 2024-10-31 NOTE — TELEPHONE ENCOUNTER
Called patient but had to leave voicemail message that provider Silke Saldivar PA-C is working from home tomorrow 11/1/2024 and we needed to change appointment to virtual appointment link via [employer], Patient doesn't have my chart & patient should call [phone] to confirm appointment.  Writer didn't change appointment in system.

## 2024-11-01 ENCOUNTER — TELEPHONE (OUTPATIENT)
Dept: PHYSICAL THERAPY | Facility: OTHER | Age: 64
End: 2024-11-01

## 2024-11-01 NOTE — TELEPHONE ENCOUNTER
Call placed to the patient per [facility] referral.    Voice message left for patient to call back. Phone number and hours of business provided.     This is the 1st attempt to reach the patient.  Will defer per protocol.    NOTE: Per chart Pt has a f/u with PCP on 11/4/24 re: back pain

## 2024-11-01 NOTE — TELEPHONE ENCOUNTER
Patient called back in regards to VM about this mornings appt. Patient said he cannot do virtual and wanted to cancel and r/s. Writer r/s patient and cancelled appt for today.

## 2024-11-12 ENCOUNTER — TELEPHONE (OUTPATIENT)
Dept: PSYCHIATRY | Facility: CLINIC | Age: 64
End: 2024-11-12

## 2024-11-12 DIAGNOSIS — F41.1 GAD (GENERALIZED ANXIETY DISORDER): ICD-10-CM

## 2024-11-12 NOTE — TELEPHONE ENCOUNTER
Patient called the RX Refill Line. Message is being forwarded to the office.       Patient is requesting   sertraline (ZOLOFT) 50 mg tablet    Patient stated that he is now taking 1.5 tablets daily.  Can we send in a new script reflecting this?    Please contact patient at   [phone]

## 2024-11-14 ENCOUNTER — NURSE TRIAGE (OUTPATIENT)
Dept: PHYSICAL THERAPY | Facility: OTHER | Age: 64
End: 2024-11-14

## 2024-11-14 DIAGNOSIS — M54.42 ACUTE BILATERAL LOW BACK PAIN WITH LEFT-SIDED SCIATICA: Primary | ICD-10-CM

## 2024-11-14 NOTE — TELEPHONE ENCOUNTER
Additional Information   Negative: Is this related to a work injury?   Negative: Is this related to an MVA?   Negative: Are you currently recieving homecare services?   Negative: Has the patient had unexplained weight loss?   Negative: Does the patient have a fever?   Negative: Is the patient experiencing urine retention?   Negative: Is the patient experiencing acute drop foot or paralysis?   Negative: Has the patient experienced major trauma? (fall from height, high speed collision, direct blow to spine) and is also experiencing nausea, light-headedness, or loss of consciousness?   Negative: Is the patient experiencing blood in sputum?   Negative: Is this a chronic condition?    Background - Initial Assessment  Clinical complaint: bilateral lower back pain which radiates to the left buttocks, groin, and left leg.States he has numbness of the knee.States this started 4 weeks ago. States he lifted a bag of cement wrong.No recent history of back issues.  Date of onset: 4 weeks  Frequency of pain: constant  Quality of pain: sharp and stabbing    Protocols used: [facility] Protocol    [facility] was reviewed in detail and what we can provide for their back pain.  Patient is agreeable to being triaged and would like to proceed with Physical Therapy.    Referral was placed for Physical Therapy at the [place] site. Patients information was sent to the  to make evaluation appointment. Patient made aware that the PT office  will be calling to schedule the appointment.  Patient was provided with the phone number to the PT office.    No further questions and/or concerns were voiced by the patient at this time. Patient states understanding of the referral that was placed.    Referral Closed.

## 2024-11-20 ENCOUNTER — EVALUATION (OUTPATIENT)
Dept: PHYSICAL THERAPY | Facility: CLINIC | Age: 64
End: 2024-11-20
Payer: COMMERCIAL

## 2024-11-20 DIAGNOSIS — M54.42 ACUTE BILATERAL LOW BACK PAIN WITH LEFT-SIDED SCIATICA: Primary | ICD-10-CM

## 2024-11-20 PROCEDURE — 97162 PT EVAL MOD COMPLEX 30 MIN: CPT

## 2024-11-20 NOTE — LETTER
2024    Emerson Ken MD  [address] [facility] [address]  [facility] [address]    Patient: Spencer Vicente  YOB: 1960   Date of Visit: 2024      Dear Dr. Ken:    One of your patients, Spencer Vicente, was referred to me by the [facility] program.  Please review the attached evaluation summary from Spencer Vicente's recent visit.  Please verify that you agree with the plan of care by signing the attached document and sending it back to our office.   If you have any questions or concerns, please don't hesitate to call.      Sincerely,    Benita Mendez      Primary Care Provider:      I certify that I have read the below Plan of Care and certify the need for these services furnished under this plan of treatment while under my care.                                  Emerson Ken MD  [address] [address]  Via In Basket               PT Evaluation   Today's date: 2024  Patient name: Spencer Vicente  : 1960  MRN: [id number]  Referring provider: Kane Lindsey, PT  Dx:   Encounter Diagnosis     ICD-10-CM    1. Acute bilateral low back pain with left-sided sciatica  M54.42 Ambulatory referral to PT spine          Assessment    Spencer Vicente is a pleasant 63 y.o. male who presents with a referring diagnosis of acute bilateral low back pain with Lt sided sciatica. The patient's greatest concern is returning to function and regaining independence. 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 The aforementioned impairments have limited the patient's ability to perform ADLs and household activities (ie. Cleaning, washing dishes etc). Differential diagnosis for this patient's  presentation includes disc derangement versus lumbar stenosis. No further referral is neccessary at this time based upon examination results. Positive prognostic indicators include motivation to improve and positive attitude. Negative prognostic indicators include high symptom irritability and prior failed treatment.     Impairments: abnormal gait, abnormal muscle firing, abnormal or restricted ROM, activity intolerance, Impaired balance, Impaired physical strength, lacks appropriate HEP, pain with function, weight-bearing intolerance, poor posture, and poor body mechanics    Symptom Irritability: moderate    Problem List:  - impaired lumbar ROM   - adverse neural tension     Concordant Sign:  - ambulation    Patient education performed this session included: home exercise program, plan of care, expected tissue healing time, prognosis and diagnosis, heat, and ice    Patient verbalized good understanding of POC.    Please contact me if you have any questions or recommendations. Thank you for the referral and the opportunity to share in Spencer Graysonrylee's care.       Plan    Patient would benefit from: Skilled PT  Planned modality interventions: biofeedback, cryotherapy, TENS, thermotherapy (hot pack), and traction  Planned therapy interventions: abdominal trunk stabilization, activity modification, behavior modification, body mechanics training, functional ROM exercises, graded exercise, graded motor, HEP, joint mobilization, manual therapy, Villanueva taping, motor coordination training, neuromuscular re-education, patient education, postural training, strengthening, stretching, therapeutic activities, and therapeutic exercises    Frequency:  1-2x per week  Duration in weeks: 6 weeks    Plan of Care beginning date: 11/20/2024  Plan of Care expiration date: 6 weeks - 1/1/2025  Treatment plan discussed with: patient      Goals    Short Term Goals (3 weeks):    Patient will be independent with basic HEP.  Patient will  report >50% reduction in pain.  Patient will demonstrate >1/3 improvement in MMT grade as applicable  Patient will be able to D/C RW secondary to FOF  Patient will be able to perform light household activities (ie. Cleaning, swepping, etc)    Long Term Goals (6 weeks):  Patient will be independent in a comprehensive home exercise program  Patient FOTO score will improve by 10 points  Patient will self-report >75% improvement in function  Patient will be able to perform heavy household activities (ie. Vacuuming, getting on/off floor)  Patient will be able to ambulate community distances without limitation      History    History of Present Illness  Mechanism of injury: Patient reports that he was putting in a hand-rail at home and bought a cement bag at [employer] approx 2-3 weeks ago. He bent over and lifted it up awkwardly and felt a sharp pain. He has been using a RW for community distance ambulation. He has a lot of personal issues going on that have also compounded on his pain. He tried a chiropractor that helped, but the results would fade between sessions. He reports pain shoots from his back down his Lt leg to the ankle. He has some numbness/tingling in his Lt leg occasionally. He is retired. Sitting is usually the best, but standing and walking is limited to household distances. He is able to sleep in a recliner chair, but has returned to the bed. He has not problems once in bed, but has a hard time getting in and out of bed.     Prior level of function: retired, active    Progression: no change    Previous treatment: chiropractic    Patient goals for therapy: decrease pain, increase motion, increase strength, independence with ADLs, and return to leisure activities    Pain   11/20/2024    Current 3/10    Best 0/10    Worst 10/10     Location: bilateral low back into Rt glute, groin, anterior knee and shin  Description: stabbing and sharp  Aggravating factors: sitting, standing, walking, and  lifting  Relieving factors: heat, ice, and medications (Tylenol)      Physical Examination     Red Flag Screen  (+): age >50 years old, painful defecation, pain with coughing/sneezing    Postural Assessment  Posture in Sitting: poor  Posture in Standing: poor  Postural Correction: has no consistent effect   Manual or self correction? self correction    Sensation  Light touch: intact bilaterally    Lumbar Spine ROM   11/20/2024    Flexion 75%*    Extension 15%*    Lt Rotation 50%*    Rt Rotation 50%*    Lt Lateral Flexion 50%*    Rt Lateral Flexion 50%*     (*) = reproduction of patient's reported pain    LE MMT   11/20/2024    LEFT RIGHT     Hip Flexion 3/5* 4/5*    Hip Extension 3/5* 4/5    Hip Abduction 3/5* 4/5*    Hip Adduction 3/5* 4/5       Knee Flexion 4/5* 4/5*    Knee Extension 4/5* 4/5*       Ankle DF 4/5* 4/5    Ankle PF 3/5* 3/5     (*) = reproduction of patient's reported pain    Mechanical Assessment  Mechanically unresponsive     Flexibility   11/20/2024    LEFT RIGHT     Hamstring Max limit (neural tension) Mod limit (neural tension)     Diagnostic Tests Performed  (+): slump B/L, crossed SLR    Functional Assessment  Gait Assessment: significant antalgic gait, improved post-session           Insurance:  A/CMS Eval/ Re-eval Auth #/ Referral # Total units  Start date  Expiration date Extension  Visit limitation?  PT only or  PT+OT? Co-Insurance   CMS ([facility]) 11/20/2024 Auth needed                                                            POC Start Date POC Expiration Date Signed POC?   11/20/2024 6 weeks - 1/1/2025       Date               Units:  Used               Authed:  Remaining                  Precautions:   Past Medical History:   Diagnosis Date   • BPH (benign prostatic hyperplasia)    • CAD (coronary artery disease)    • Claustrophobia    • Colon polyp    • Depression    • Diabetes mellitus (HCC)    • Expressive aphasia     only after anesthesia lasts about one hour   •  Hyperlipidemia    • Hypertension    • Migraine     Hx none since 2014   • Orthostatic hypotension    • Shortness of breath     exertional SOB   • Sleep apnea     [place] not wear mask ,ill fitting and noise made it impossible to wear       Date 11/20/2024        Visit Number IE        FOTO Tracking Intake survey     Follow-up #1   Manual         Thoracic mobs         Lumbar mobs                                    TherEx         LTR X10 every hour; before transitional movements        SKTC/DKTC                                                               Neuro Re-Ed         TrA isos                                                                        TherAct         Patient education HEP and POC x 10 min        Posture education         Lifting mechanics                           Gait Training                                    Modalities

## 2024-11-20 NOTE — PROGRESS NOTES
PT Evaluation   Today's date: 2024  Patient name: Spencer Vicente  : 1960  MRN: [id number]  Referring provider: Kane Lindsey PT  Dx:   Encounter Diagnosis     ICD-10-CM    1. Acute bilateral low back pain with left-sided sciatica  M54.42 Ambulatory referral to PT spine          Assessment    Spencer Vicente is a pleasant 63 y.o. male who presents with a referring diagnosis of acute bilateral low back pain with Lt sided sciatica. The patient's greatest concern is returning to function and regaining independence. 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 The aforementioned impairments have limited the patient's ability to perform ADLs and household activities (ie. Cleaning, washing dishes etc). Differential diagnosis for this patient's presentation includes disc derangement versus lumbar stenosis. No further referral is neccessary at this time based upon examination results. Positive prognostic indicators include motivation to improve and positive attitude. Negative prognostic indicators include high symptom irritability and prior failed treatment.     Impairments: abnormal gait, abnormal muscle firing, abnormal or restricted ROM, activity intolerance, Impaired balance, Impaired physical strength, lacks appropriate HEP, pain with function, weight-bearing intolerance, poor posture, and poor body mechanics    Symptom Irritability: moderate    Problem List:  - impaired lumbar ROM   - adverse neural tension     Concordant Sign:  - ambulation    Patient education performed this session included: home exercise program, plan of care, expected tissue healing time, prognosis and diagnosis, heat, and ice    Patient verbalized good understanding of  POC.    Please contact me if you have any questions or recommendations. Thank you for the referral and the opportunity to share in Spencer Vicente's care.       Plan    Patient would benefit from: Skilled PT  Planned modality interventions: biofeedback, cryotherapy, TENS, thermotherapy (hot pack), and traction  Planned therapy interventions: abdominal trunk stabilization, activity modification, behavior modification, body mechanics training, functional ROM exercises, graded exercise, graded motor, HEP, joint mobilization, manual therapy, Villanueva taping, motor coordination training, neuromuscular re-education, patient education, postural training, strengthening, stretching, therapeutic activities, and therapeutic exercises    Frequency:  1-2x per week  Duration in weeks: 6 weeks    Plan of Care beginning date: 11/20/2024  Plan of Care expiration date: 6 weeks - 1/1/2025  Treatment plan discussed with: patient      Goals    Short Term Goals (3 weeks):    Patient will be independent with basic HEP.  Patient will report >50% reduction in pain.  Patient will demonstrate >1/3 improvement in MMT grade as applicable  Patient will be able to D/C RW secondary to FOF  Patient will be able to perform light household activities (ie. Cleaning, swepping, etc)    Long Term Goals (6 weeks):  Patient will be independent in a comprehensive home exercise program  Patient FOTO score will improve by 10 points  Patient will self-report >75% improvement in function  Patient will be able to perform heavy household activities (ie. Vacuuming, getting on/off floor)  Patient will be able to ambulate community distances without limitation      History    History of Present Illness  Mechanism of injury: Patient reports that he was putting in a hand-rail at home and bought a cement bag at [employer] approx 2-3 weeks ago. He bent over and lifted it up awkwardly and felt a sharp pain. He has been using a RW for community distance ambulation. He has  a lot of personal issues going on that have also compounded on his pain. He tried a chiropractor that helped, but the results would fade between sessions. He reports pain shoots from his back down his Lt leg to the ankle. He has some numbness/tingling in his Lt leg occasionally. He is retired. Sitting is usually the best, but standing and walking is limited to household distances. He is able to sleep in a recliner chair, but has returned to the bed. He has not problems once in bed, but has a hard time getting in and out of bed.     Prior level of function: retired, active    Progression: no change    Previous treatment: chiropractic    Patient goals for therapy: decrease pain, increase motion, increase strength, independence with ADLs, and return to leisure activities    Pain   11/20/2024    Current 3/10    Best 0/10    Worst 10/10     Location: bilateral low back into Rt glute, groin, anterior knee and shin  Description: stabbing and sharp  Aggravating factors: sitting, standing, walking, and lifting  Relieving factors: heat, ice, and medications (Tylenol)      Physical Examination     Red Flag Screen  (+): age >50 years old, painful defecation, pain with coughing/sneezing    Postural Assessment  Posture in Sitting: poor  Posture in Standing: poor  Postural Correction: has no consistent effect   Manual or self correction? self correction    Sensation  Light touch: intact bilaterally    Lumbar Spine ROM   11/20/2024    Flexion 75%*    Extension 15%*    Lt Rotation 50%*    Rt Rotation 50%*    Lt Lateral Flexion 50%*    Rt Lateral Flexion 50%*     (*) = reproduction of patient's reported pain    LE MMT   11/20/2024    LEFT RIGHT     Hip Flexion 3/5* 4/5*    Hip Extension 3/5* 4/5    Hip Abduction 3/5* 4/5*    Hip Adduction 3/5* 4/5       Knee Flexion 4/5* 4/5*    Knee Extension 4/5* 4/5*       Ankle DF 4/5* 4/5    Ankle PF 3/5* 3/5     (*) = reproduction of patient's reported pain    Mechanical  Assessment  Mechanically unresponsive     Flexibility   11/20/2024    LEFT RIGHT     Hamstring Max limit (neural tension) Mod limit (neural tension)     Diagnostic Tests Performed  (+): slump B/L, crossed SLR    Functional Assessment  Gait Assessment: significant antalgic gait, improved post-session           Insurance:  AMA/CMS Eval/ Re-eval Auth #/ Referral # Total units  Start date  Expiration date Extension  Visit limitation?  PT only or  PT+OT? Co-Insurance   CMS ([facility] [facility]) 11/20/2024 Auth needed                                                            POC Start Date POC Expiration Date Signed POC?   11/20/2024 6 weeks - 1/1/2025       Date               Units:  Used               Authed:  Remaining                  Precautions:   Past Medical History:   Diagnosis Date    BPH (benign prostatic hyperplasia)     CAD (coronary artery disease)     Claustrophobia     Colon polyp     Depression     Diabetes mellitus (HCC)     Expressive aphasia     only after anesthesia lasts about one hour    Hyperlipidemia     Hypertension     Migraine     Hx none since 2014    Orthostatic hypotension     Shortness of breath     exertional SOB    Sleep apnea     [place] not wear mask ,ill fitting and noise made it impossible to wear       Date 11/20/2024        Visit Number IE        FOTO Tracking Intake survey     Follow-up #1   Manual         Thoracic mobs         Lumbar mobs                                    TherEx         LTR X10 every hour; before transitional movements        SKTC/DKTC                                                               Neuro Re-Ed         TrA isos                                                                        TherAct         Patient education HEP and POC x 10 min        Posture education         Lifting mechanics                           Gait Training                                    Modalities

## 2024-11-21 ENCOUNTER — TELEMEDICINE (OUTPATIENT)
Dept: PSYCHIATRY | Facility: CLINIC | Age: 64
End: 2024-11-21
Payer: COMMERCIAL

## 2024-11-21 ENCOUNTER — TELEPHONE (OUTPATIENT)
Age: 64
End: 2024-11-21

## 2024-11-21 DIAGNOSIS — F41.1 GAD (GENERALIZED ANXIETY DISORDER): Primary | ICD-10-CM

## 2024-11-21 DIAGNOSIS — F42.3 HOARDING DISORDER: ICD-10-CM

## 2024-11-21 PROCEDURE — 99214 OFFICE O/P EST MOD 30 MIN: CPT | Performed by: PHYSICIAN ASSISTANT

## 2024-11-21 PROCEDURE — G2211 COMPLEX E/M VISIT ADD ON: HCPCS | Performed by: PHYSICIAN ASSISTANT

## 2024-11-21 NOTE — BH TREATMENT PLAN
TREATMENT PLAN (Medication Management Only)        [facility]    Name and Date of Birth:  Spencer Vicente 63 y.o. 1960  Date of Treatment Plan: November 21, 2024  Diagnosis/Diagnoses:    1. JUAN (generalized anxiety disorder)    2. Hoarding disorder      Strengths/Personal Resources for Self-Care: taking medications as prescribed, motivation for treatment, willingness to work on problems.  Area/Areas of need (in own words): anxiety, obsessive-compulsive symptoms  1. Long Term Goal: maintain improvement in anxiety.  Target Date:6 months - 5/21/2025  Person/Persons responsible for completion of goal: Spencer  2.  Short Term Objective (s) - How will we reach this goal?:   A. Provider new recommended medication/dosage changes and/or continue medication(s): continue current medications as prescribed.  B. Attend psychotherapy regularly.  C. N/A.  Target Date:6 months - 5/21/2025  Person/Persons Responsible for Completion of Goal: Spencer  Progress Towards Goals: continuing treatment  Treatment Modality: medication management every 2 months  Review due 180 days from date of this plan: 6 months - 5/21/2025  Expected length of service: ongoing treatment  My Physician/PA/NP and I have developed this plan together and I agree to work on the goals and objectives. I understand the treatment goals that were developed for my treatment.

## 2024-11-21 NOTE — PSYCH
This note was not shared with the patient due to reasonable likelihood of causing patient harm    PROGRESS NOTE        [facility] PSYCHIATRIC [facility]      Name and Date of Birth:  Spencer Vicente 63 y.o. 1960 MRN: [id number]    Virtual Regular Visit    Visit Date: 11/21/24     Verification of patient location:    Patient is located at Home in the following state in which I hold an active license [place]    Reason for visit is   Chief Complaint   Patient presents with    Follow-up    Medication Management     Encounter provider Silke Saldivar    Provider located at [address]  [address]  [facility] [id number] [phone]    The patient was identified by name and date of birth. Spencer Vicente was informed that this is a telemedicine visit and that the visit is being conducted throughthe Epic Embedded platform. He agrees to proceed.  My office door was closed. No one else was in the room. He acknowledged consent and understanding of privacy and security of the video platform. The patient has agreed to participate and understands they can discontinue the visit at any time.    Patient is aware this is a billable service.     VIRTUAL VISIT DISCLAIMER    Spencer Vicente verbally agrees to participate in Virtual Care Services. Pt is aware that Virtual Care Services could be limited without vital signs or the ability to perform a full hands-on physical exam. Spencer Vicente understands he or the provider may request at any time to terminate the video visit and request the patient to seek care or treatment in person.   Assessment & Plan  JUAN (generalized anxiety disorder)         Hoarding disorder            SUBJECTIVE:    Spencer Vicente is a sabino 63 y.o. male with a history of Generalized Anxiety Disorder and OCD who presents today for follow-up and medication management. Since his last visit he had been doing  "well and feels the sertraline increase was helpful for anxiety and was helping him to feel more motivated to work on his yard and clean up his house. He did hurt his back recently which slowed him down. He also notes his mom did pass away last week. He has good supports in his brother and sister and their spouses and he reports he has not \"devolved\" to drinking away his feelings. He denies any SE with the current dose of sertraline.    He denies any suicidal ideation, intent or plan at present; denies any homicidal ideation, intent or plan at present.    He denies any auditory hallucinations, denies any auditory hallucinations, denies any delusions.    He denies any side effects from current psychiatric medications.    HPI ROS Appetite Changes and Sleep:     He reports adequate number of sleep hours, adequate appetite, adequate energy level    Current Rating Scores:     None completed today.     Review Of Systems:    Mood depression   Behavior appropriate, cooperative, and calm   Thought Content normal   General emotional problems   Personality no change in personality   Other Psych Symptoms normal   Constitutional as noted in HPI   ENT as noted in HPI   Cardiovascular as noted in HPI   Respiratory as noted in HPI   Gastrointestinal as noted in HPI   Genitourinary as noted in HPI   Musculoskeletal as noted in HPI   Integumentary as noted in HPI   Neurological as noted in HPI   Endocrine negative   Other Symptoms none, all other systems are negative     Family Psychiatric History:     Family History   Problem Relation Age of Onset    Colon cancer Maternal Grandmother     Breast cancer Mother     Other Mother     Heart Valve Disease Father     Leukemia Father     Migraines Sister     Heart failure Family      Social/Substance Abuse History:    Social History     Socioeconomic History    Marital status: Single     Spouse name: Not on file    Number of children: Not on file    Years of education: Not on file    Highest " education level: Not on file   Occupational History    Not on file   Tobacco Use    Smoking status: Some Days     Types: Cigars     Start date: 8/21/1992    Smokeless tobacco: Never    Tobacco comments:     2 a day  was 6 a day for 6 months only   Vaping Use    Vaping status: Never Used   Substance and Sexual Activity    Alcohol use: Yes     Comment: 5 beers a week    Drug use: No    Sexual activity: Not on file   Other Topics Concern    Not on file   Social History Narrative    Not on file     Social Drivers of Health     Financial Resource Strain: Medium Risk (8/21/2019)    Overall Financial Resource Strain (CARDIA)     Difficulty of Paying Living Expenses: Somewhat hard   Food Insecurity: Not on file   Transportation Needs: No Transportation Needs (8/21/2019)    PRAPARE - Transportation     Lack of Transportation (Medical): No     Lack of Transportation (Non-Medical): No   Physical Activity: Not on file   Stress: Not on file   Social Connections: Not on file   Intimate Partner Violence: Not on file   Housing Stability: Not on file     The following portions of the patient's history were reviewed and updated as appropriate: past family history, past medical history, past social history, past surgical history and problem list.    OBJECTIVE:     Mental Status Evaluation:  Appearance:  dressed appropriately, adequate grooming, looks stated age   Behavior:  pleasant, cooperative, calm, interacts appropriately with this writer   Speech:  normal rate, normal volume, normal pitch   Mood:  mildly depressed   Affect:  mood-congruent   Thought Process:  organized, logical, coherent   Associations: intact associations   Thought Content:  no overt delusions, no paranoia noted on exam   Perceptual Disturbances: no auditory hallucinations, no visual hallucinations   Risk Potential: Suicidal ideation - None  Homicidal ideation - None  Potential for aggression - No   Sensorium:  oriented to person, place, and time/date   Memory:   [No Acute Distress] : no acute distress recent and remote memory grossly intact   Consciousness:  alert and awake   Attention/Concentration: attention span and concentration are age appropriate   Insight:  age appropriate   Judgment: age appropriate   Gait/Station: Unable to assess today due to virtual visit   Motor Activity: unable to assess today due to virtual visit   *lost video part way through visit due to technical difficulties but audio remained throughout visit    Laboratory Results: I have personally reviewed all pertinent laboratory/tests results    Suicide/Homicide Risk Assessment:    Risk of Harm to Self:  The following ratings are based on assessment at the time of the interview  Based on today's assessment, Spencer presents the following risk of harm to self: none    Risk of Harm to Others:  The following ratings are based on assessment at the time of the interview  Based on today's assessment, Spencer presents the following risk of harm to others: none    The following interventions are recommended: no intervention changes needed    Assessment/Plan:      He overall feels this dose of sertraline works well for him and wants to remain on it unchanged. His mom did pass last week so he is feeling more down but he reports he is managing and using his supports including family and his therapist. He is court mandated to see a therapist and a psychiatrist (court mandated therapist is [facility]). He will continue to work with Spencer Waters, PhD, [facility], for therapy as well. We have discussed his safety plan and he agrees that if he experience unsafe thoughts that he will reach out to his supports including this office, the suicide hotline, and emergency services if necessary. Spencer is aware of non-emergent and emergent mental health resources. They are able to contract for their own safety at this time.    Will follow up in 2 months. Patient is aware to call the office if questions or concerns arise sooner.      Diagnoses and all orders for this  [Well Nourished] : well nourished visit:    JUAN (generalized anxiety disorder)    Hoarding disorder        Treatment Recommendations/Precautions:    Continue current medications:     - sertraline 75 mg qd    Does not want any medication changes  Aware of 24 hour and weekend coverage for urgent situations accessed by calling [facility] main practice number  Medication management every 2 months  Continue psychotherapy with own therapist  I am scheduling this patient out for greater than 3 months: No    Medications Risks/Benefits      Risks, Benefits And Possible Side Effects Of Medications:    Risks, benefits, and possible side effects of medications explained to Spencer and he verbalizes understanding and agreement for treatment.    Controlled Medication Discussion:     Not applicable    Psychotherapy Provided:     Individual psychotherapy provided: No    Treatment Plan:    Completed and signed during the session: Not applicable - Treatment Plan not due at this session    Visit Time    Visit Start Time:  3:00 PM  Visit End Time:  3:20 PM  Total Visit Duration:  20 minutes    Silke Saldivar 11/21/24    [Well Developed] : well developed [Well-Appearing] : well-appearing [Normal Sclera/Conjunctiva] : normal sclera/conjunctiva [PERRL] : pupils equal round and reactive to light [EOMI] : extraocular movements intact [Normal Outer Ear/Nose] : the outer ears and nose were normal in appearance [Normal Oropharynx] : the oropharynx was normal [No JVD] : no jugular venous distention [Supple] : supple [No Lymphadenopathy] : no lymphadenopathy [Thyroid Normal, No Nodules] : the thyroid was normal and there were no nodules present [No Respiratory Distress] : no respiratory distress  [Clear to Auscultation] : lungs were clear to auscultation bilaterally [No Accessory Muscle Use] : no accessory muscle use [Normal Rate] : normal rate  [Regular Rhythm] : with a regular rhythm [Normal S1, S2] : normal S1 and S2 [No Murmur] : no murmur heard [No Carotid Bruits] : no carotid bruits [No Abdominal Bruit] : a ~M bruit was not heard ~T in the abdomen [No Varicosities] : no varicosities [Pedal Pulses Present] : the pedal pulses are present [No Edema] : there was no peripheral edema [No Extremity Clubbing/Cyanosis] : no extremity clubbing/cyanosis [No Palpable Aorta] : no palpable aorta [Soft] : abdomen soft [Non Tender] : non-tender [Non-distended] : non-distended [No Masses] : no abdominal mass palpated [No HSM] : no HSM [Normal Bowel Sounds] : normal bowel sounds [Normal Posterior Cervical Nodes] : no posterior cervical lymphadenopathy [Normal Anterior Cervical Nodes] : no anterior cervical lymphadenopathy [No CVA Tenderness] : no CVA  tenderness [No Spinal Tenderness] : no spinal tenderness [No Joint Swelling] : no joint swelling [Grossly Normal Strength/Tone] : grossly normal strength/tone [No Rash] : no rash [Normal Gait] : normal gait [Coordination Grossly Intact] : coordination grossly intact [No Focal Deficits] : no focal deficits [Deep Tendon Reflexes (DTR)] : deep tendon reflexes were 2+ and symmetric [Normal Affect] : the affect was normal [Normal Insight/Judgement] : insight and judgment were intact

## 2024-11-21 NOTE — ASSESSMENT & PLAN NOTE
Improving but not yet at goal - continue on sertraline 75 mg qd; continue with outside therapist; f/u in 2 months  
Improving but not yet at goal - continue on sertraline 75 mg qd; continue with outside therapist; f/u in 2 months  
Yes

## 2024-11-21 NOTE — TELEPHONE ENCOUNTER
Pt called to get his appt for 11/21/24 at 3pm switched to a virtual visit due to patient hurt his back and he is in pain.  Writer switched appt.

## 2024-11-26 ENCOUNTER — OFFICE VISIT (OUTPATIENT)
Dept: PHYSICAL THERAPY | Facility: CLINIC | Age: 64
End: 2024-11-26
Payer: COMMERCIAL

## 2024-11-26 DIAGNOSIS — M54.42 ACUTE BILATERAL LOW BACK PAIN WITH LEFT-SIDED SCIATICA: Primary | ICD-10-CM

## 2024-11-26 PROCEDURE — 97112 NEUROMUSCULAR REEDUCATION: CPT

## 2024-11-26 PROCEDURE — 97110 THERAPEUTIC EXERCISES: CPT

## 2024-11-26 NOTE — PROGRESS NOTES
Daily Note     Today's date: 2024  Patient name: Spencer Vicente  : 1960  MRN: [id number]  Referring provider: Kane Lindsey, SUE  Dx:   Encounter Diagnosis     ICD-10-CM    1. Acute bilateral low back pain with left-sided sciatica  M54.42                      Subjective: Patient reported mild adherence to HEP, but overall he did not do them as prescribed. He continues to report low back and anterior thighs bilaterally.       Objective: See treatment diary below      Assessment: Tolerated treatment well. Initiated session with active GARZON which patient reported improved his anterior thigh pain. Continued with basic lumbar mobility activities and initiation of core stability which patient tolerated well. Will continue to advance as tolerated as patient was very fatigued post-session. Patient demonstrated fatigue post treatment and would benefit from continued PT to address functional mobility deficits and return to PLOF.      Plan: Continue per plan of care.          Insurance:  AMA/CMS Eval/ Re-eval Auth #/ Referral # Total units  Start date  Expiration date Extension  Visit limitation?  PT only or  PT+OT? Co-Insurance   CMS ([facility]) 2024 Auth needed                                                            POC Start Date POC Expiration Date Signed POC?   2024 6 weeks - 2025       Date               Units:  Used               Authed:  Remaining                  Precautions:   Past Medical History:   Diagnosis Date    BPH (benign prostatic hyperplasia)     CAD (coronary artery disease)     Claustrophobia     Colon polyp     Depression     Diabetes mellitus (HCC)     Expressive aphasia     only after anesthesia lasts about one hour    Hyperlipidemia     Hypertension     Migraine     Hx none since     Orthostatic hypotension     Shortness of breath     exertional SOB    Sleep apnea     [place] not wear mask ,ill fitting and noise made it impossible to wear       Date 2024  11/26/24       Visit Number IE 2       FOTO Tracking Intake survey     Follow-up #1   Manual         Thoracic mobs         Lumbar mobs                                    TherEx         Active GARZON  NS x 8 min       LTR X10 every hour; before transitional movements        SKTC/DKTC         Seated ball roll outs  Forward w/ lateral flexion x10                                                    Neuro Re-Ed         TrA isos  Seated w/ hip add iso 20 x 5 sec    Seated w/ iso hip add and alt knee ext 2x10    Seated w/ hip abd iso 20 x 5 sec       Pallof press  GTB 2x10                                                             TherAct         Patient education HEP and POC x 10 min        Posture education         Lifting mechanics                           Gait Training                                    Modalities

## 2024-12-10 ENCOUNTER — OFFICE VISIT (OUTPATIENT)
Dept: PAIN MEDICINE | Facility: CLINIC | Age: 64
End: 2024-12-10
Payer: COMMERCIAL

## 2024-12-10 ENCOUNTER — HOSPITAL ENCOUNTER (OUTPATIENT)
Dept: RADIOLOGY | Facility: HOSPITAL | Age: 64
Discharge: HOME | End: 2024-12-10
Payer: COMMERCIAL

## 2024-12-10 VITALS
WEIGHT: 242 LBS | BODY MASS INDEX: 37.98 KG/M2 | SYSTOLIC BLOOD PRESSURE: 140 MMHG | DIASTOLIC BLOOD PRESSURE: 77 MMHG | HEART RATE: 54 BPM | HEIGHT: 67 IN

## 2024-12-10 DIAGNOSIS — M25.561 ACUTE PAIN OF BOTH KNEES: ICD-10-CM

## 2024-12-10 DIAGNOSIS — M25.562 ACUTE PAIN OF BOTH KNEES: ICD-10-CM

## 2024-12-10 DIAGNOSIS — E66.01 OBESITY, MORBID (HCC): ICD-10-CM

## 2024-12-10 DIAGNOSIS — M54.16 LUMBAR RADICULOPATHY: ICD-10-CM

## 2024-12-10 DIAGNOSIS — M54.40 ACUTE BILATERAL LOW BACK PAIN WITH SCIATICA, SCIATICA LATERALITY UNSPECIFIED: Primary | ICD-10-CM

## 2024-12-10 DIAGNOSIS — M54.40 ACUTE BILATERAL LOW BACK PAIN WITH SCIATICA, SCIATICA LATERALITY UNSPECIFIED: ICD-10-CM

## 2024-12-10 PROCEDURE — 73562 X-RAY EXAM OF KNEE 3: CPT

## 2024-12-10 PROCEDURE — 99204 OFFICE O/P NEW MOD 45 MIN: CPT | Performed by: PHYSICAL MEDICINE & REHABILITATION

## 2024-12-10 PROCEDURE — 72110 X-RAY EXAM L-2 SPINE 4/>VWS: CPT

## 2024-12-10 NOTE — PROGRESS NOTES
Assessment  Myofascial pain   Lumbar Radiculopathy  Bilateral knee pain    Plan    Spencer Vicente is a 64 y.o. male with past medical history of CAD, HTN, ASTHMA, MOUNIKA , DM and CKD.  Patient is here secondary to low back pain that started roughly 2 and half months ago.  He states it started after improper lifting of a heavy object (80 pounds)  He states the pain is moderate in nature.  He states the pain can range from anywhere to 5 out of 10 up to an 8 out of 10 at times.  He states the pain is there intermittently.  He states there is no typical pattern.  He describes the pain as cramping, shooting numbness and sharp in nature.  He states the pain does radiate down to the bilateral lower extremities.  He states the left side goes all the way down to the foot while the right side close to the knee. He states the right side is worse compare to the left side. He also endorse pain in the bilateral knee. Patient does use a cane and a walker to ambulate.  He states the pain is worse with standing,, sitting and walking.  Patient has tried physical therapy and chiropractic care in the past with moderate relief.  Patient is currently managing his pain with Tylenol as needed. Denies bowel/bladder dysfunction, saddle anesthesia, fevers, chills, weight loss, night pain, or night sweats at this time. The pain interferes with function, ADLs and quality of life.      At this time we will      Will plan for xray of the lumbar spine as well as MRI with out contrast of the lumbar spine.   Will get an xray of the bilateral knee to rule out osteoarthritis.   Continue physical therapy - 2-3x/week for stretching, strengthening (especially of core muscles), ROM exercises, HEP and modalities PRN including myofascial release, moist heat     My impressions and treatment recommendations were discussed in detail with the patient, who verbalized understanding and had no further questions.    History of Present Illness    Spencer Vicente is a 64  y.o. male with past medical history of CAD, HTN, ASTHMA, MOUNIKA , DM and CKD.  Patient is here secondary to low back pain that started roughly 2 and half months ago.  He states it started after improper lifting of a heavy object (80 pounds)  He states the pain is moderate in nature.  He states the pain can range from anywhere to 5 out of 10 up to an 8 out of 10 at times.  He states the pain is there intermittently.  He states there is no typical pattern.  He describes the pain as cramping, shooting numbness and sharp in nature.  He states the pain does radiate down to the bilateral lower extremities.  He states the left side goes all the way down to the foot while the right side close to the knee. He states the right side is worse compare to the left side. Patient does use a cane and a walker to ambulate.  He states the pain is worse with standing,, sitting and walking.  Patient has tried physical therapy and chiropractic care in the past with moderate relief.  Patient is currently managing his pain with Tylenol as needed. Denies bowel/bladder dysfunction, saddle anesthesia, fevers, chills, weight loss, night pain, or night sweats at this time. The pain interferes with function, ADLs and quality of life.      I have personally reviewed and/or updated the patient's past medical history, past surgical history, family history, social history, current medications, allergies, and vital signs today.     Review of Systems   Musculoskeletal:  Positive for back pain.       Patient Active Problem List   Diagnosis    History of colon polyps    Mild intermittent asthma without complication    MOUNIKA (obstructive sleep apnea)    SOBOE (shortness of breath on exertion)    CAD (coronary artery disease)    Hyperlipidemia    Type 2 diabetes mellitus (HCC)    Obesity    Cellulitis of left lower extremity    CKD (chronic kidney disease)    Hypertension    Status post placement of implantable loop recorder    JUAN (generalized anxiety  disorder)    Hoarding disorder       Past Medical History:   Diagnosis Date    BPH (benign prostatic hyperplasia)     CAD (coronary artery disease)     Claustrophobia     Colon polyp     Depression     Diabetes mellitus (HCC)     Expressive aphasia     only after anesthesia lasts about one hour    Hyperlipidemia     Hypertension     Migraine     Hx none since 2014    Orthostatic hypotension     Shortness of breath     exertional SOB    Sleep apnea     [place] not wear mask ,ill fitting and noise made it impossible to wear       Past Surgical History:   Procedure Laterality Date    CARDIAC LOOP RECORDER      COLONOSCOPY      CORONARY STENT PLACEMENT N/A mar and oct 2017    x2    TN XCAPSL CTRC RMVL INSJ IO LENS PROSTH W/O ECP Left 8/31/2020    Procedure: EXTRACTION EXTRACAPSULAR CATARACT PHACO INTRAOCULAR LENS (IOL);  Surgeon: Santiago Rolle MD;  Location: [facility] MAIN OR;  Service: Ophthalmology    TN XCAPSL CTRC RMVL INSJ IO LENS PROSTH W/O ECP Right 3/14/2022    Procedure: EXTRACTION EXTRACAPSULAR CATARACT PHACO INTRAOCULAR LENS (IOL);  Surgeon: Santiago Rolle MD;  Location: [facility] MAIN OR;  Service: Ophthalmology       Family History   Problem Relation Age of Onset    Colon cancer Maternal Grandmother     Breast cancer Mother     Other Mother     Heart Valve Disease Father     Leukemia Father     Migraines Sister     Heart failure Family        Social History     Occupational History    Not on file   Tobacco Use    Smoking status: Some Days     Types: Cigars     Start date: 8/21/1992    Smokeless tobacco: Never    Tobacco comments:     2 a day  was 6 a day for 6 months only   Vaping Use    Vaping status: Never Used   Substance and Sexual Activity    Alcohol use: Yes     Comment: 5 beers a week    Drug use: No    Sexual activity: Not on file       Current Outpatient Medications on File Prior to Visit   Medication Sig    aspirin 81 mg chewable tablet Chew 81 mg daily at bedtime     atorvastatin (LIPITOR) 40 mg tablet  Take 40 mg by mouth daily at bedtime    Blood Glucose Monitoring Suppl (ONE TOUCH ULTRA MINI) w/Device KIT     Empagliflozin (Jardiance) 25 MG TABS Take 25 mg by mouth in the morning      isosorbide mononitrate (IMDUR) 30 mg 24 hr tablet Take 30 mg by mouth daily    losartan (COZAAR) 25 mg tablet Take 25 mg by mouth daily at bedtime     methocarbamol (ROBAXIN) 500 mg tablet Take 1 tablet (500 mg total) by mouth 2 (two) times a day for 4 days    nitroglycerin (NITROSTAT) 0.4 mg SL tablet Place 0.4 mg under the tongue every 5 (five) minutes as needed for chest pain    ONE TOUCH ULTRA TEST test strip TEST twice a day    ONETOUCH DELICA LANCETS FINE MISC TEST twice a day    psyllium (METAMUCIL) 58.6 % packet Take 1 packet by mouth daily    sertraline (ZOLOFT) 50 mg tablet Take 1.5 tablets (75 mg total) by mouth daily    sotalol (BETAPACE) 80 mg tablet 80 mg 2 (two) times a day       No current facility-administered medications on file prior to visit.       Allergies   Allergen Reactions    Zinc Acetate Nausea Only       Physical Exam    There were no vitals taken for this visit.    Constitutional: normal, well developed, well nourished, alert, in no distress and non-toxic and no overt pain behavior.  Eyes: anicteric  HEENT: grossly intact  Neck: supple, symmetric, trachea midline and no masses   Pulmonary:even and unlabored  Cardiovascular:No edema or pitting edema present  Skin:Normal without rashes or lesions and well hydrated  Psychiatric:Mood and affect appropriate    Musculoskeletal    LUMBAR EXAM     APPEARANCE:  No gross deformity or malalignment     TENDERNESS:  Mild tenderness over bilateral lumbar paraspinal muscles    ROM:  Decrease A/PROM of the lumbar spine  Mild with pain with flexion, extension of the lumbar spine  Pain with left side bending  Pain with right side bending    MOTOR TESTING:  Both lower extremities: Tone normal, active range of motion within functional limits with 5/5 motor power  throughout.     GAIT:  Able to walk on toes  Able to walk on heels    SPECIAL TESTS:  Normal left straight leg raising test  Normal right straight leg raising test    Imaging

## 2024-12-11 ENCOUNTER — RESULTS FOLLOW-UP (OUTPATIENT)
Dept: PAIN MEDICINE | Facility: CLINIC | Age: 64
End: 2024-12-11

## 2024-12-11 DIAGNOSIS — F41.9 ANXIETY: Primary | ICD-10-CM

## 2024-12-11 RX ORDER — LORAZEPAM 0.5 MG/1
0.5 TABLET ORAL
Qty: 1 TABLET | Refills: 0 | Status: SHIPPED | OUTPATIENT
Start: 2024-12-11

## 2024-12-11 NOTE — TELEPHONE ENCOUNTER
----- Message from Akash Bell DO sent at 12/11/2024 10:01 AM EST -----  Clinical please notify patient of x-ray bilateral knees demonstrating mild degenerative changes as well as osteophyte formation  Would plan for bilateral knee injections under ultrasound guidance if patient is interested and amenable please schedule  Thank you  ----- Message -----  From: David, Radiology Results In  Sent: 12/10/2024   3:12 PM EST  To: Akash Bell DO

## 2024-12-11 NOTE — TELEPHONE ENCOUNTER
Caller: Patient    Doctor/Office: Dr Bell    Call regarding :  Patient returning call from nursing.  Was also trying to schedule MRI as they reached out and requested a transfer to scheduling when nursing call completed     Call was transferred to: triage nurse

## 2024-12-11 NOTE — TELEPHONE ENCOUNTER
Call transferred to nurse from call center.  S/W pt and advised the same.  Pt reports wanting to hold off knee injections at this time.  Pt reports knee pain wasn't there prior to back injury.      Pt reports claustrophobic with MRI and would like something to help anxiety for MRI procedure.      Nurse provided pt with central scheduling number to schedule MRI procedure.  Pt reports has a loop recorder and will provide information off the card he has for the recorder when scheduling MRI.

## 2025-01-13 ENCOUNTER — HOSPITAL ENCOUNTER (EMERGENCY)
Facility: HOSPITAL | Age: 65
Discharge: HOME | End: 2025-01-13
Attending: EMERGENCY MEDICINE
Payer: MEDICARE

## 2025-01-13 VITALS
RESPIRATION RATE: 22 BRPM | TEMPERATURE: 97.2 F | OXYGEN SATURATION: 95 % | HEART RATE: 60 BPM | DIASTOLIC BLOOD PRESSURE: 68 MMHG | SYSTOLIC BLOOD PRESSURE: 127 MMHG

## 2025-01-13 DIAGNOSIS — M54.31 SCIATICA OF RIGHT SIDE: Primary | ICD-10-CM

## 2025-01-13 LAB
ANION GAP SERPL CALCULATED.3IONS-SCNC: 5 MMOL/L (ref 4–13)
BASOPHILS # BLD AUTO: 0.07 THOUSANDS/ΜL (ref 0–0.1)
BASOPHILS NFR BLD AUTO: 1 % (ref 0–1)
BUN SERPL-MCNC: 26 MG/DL (ref 5–25)
CALCIUM SERPL-MCNC: 9.3 MG/DL (ref 8.4–10.2)
CHLORIDE SERPL-SCNC: 106 MMOL/L (ref 96–108)
CO2 SERPL-SCNC: 25 MMOL/L (ref 21–32)
CREAT SERPL-MCNC: 0.92 MG/DL (ref 0.6–1.3)
EOSINOPHIL # BLD AUTO: 0.18 THOUSAND/ΜL (ref 0–0.61)
EOSINOPHIL NFR BLD AUTO: 1 % (ref 0–6)
ERYTHROCYTE [DISTWIDTH] IN BLOOD BY AUTOMATED COUNT: 12.8 % (ref 11.6–15.1)
GFR SERPL CREATININE-BSD FRML MDRD: 86 ML/MIN/1.73SQ M
GLUCOSE SERPL-MCNC: 146 MG/DL (ref 65–140)
HCT VFR BLD AUTO: 49 % (ref 36.5–49.3)
HGB BLD-MCNC: 16.9 G/DL (ref 12–17)
IMM GRANULOCYTES # BLD AUTO: 0.04 THOUSAND/UL (ref 0–0.2)
IMM GRANULOCYTES NFR BLD AUTO: 0 % (ref 0–2)
LYMPHOCYTES # BLD AUTO: 2.91 THOUSANDS/ΜL (ref 0.6–4.47)
LYMPHOCYTES NFR BLD AUTO: 23 % (ref 14–44)
MCH RBC QN AUTO: 30.8 PG (ref 26.8–34.3)
MCHC RBC AUTO-ENTMCNC: 34.5 G/DL (ref 31.4–37.4)
MCV RBC AUTO: 89 FL (ref 82–98)
MONOCYTES # BLD AUTO: 0.96 THOUSAND/ΜL (ref 0.17–1.22)
MONOCYTES NFR BLD AUTO: 8 % (ref 4–12)
NEUTROPHILS # BLD AUTO: 8.27 THOUSANDS/ΜL (ref 1.85–7.62)
NEUTS SEG NFR BLD AUTO: 67 % (ref 43–75)
NRBC BLD AUTO-RTO: 0 /100 WBCS
PLATELET # BLD AUTO: 233 THOUSANDS/UL (ref 149–390)
PMV BLD AUTO: 10.5 FL (ref 8.9–12.7)
POTASSIUM SERPL-SCNC: 4.2 MMOL/L (ref 3.5–5.3)
RBC # BLD AUTO: 5.48 MILLION/UL (ref 3.88–5.62)
SODIUM SERPL-SCNC: 136 MMOL/L (ref 135–147)
WBC # BLD AUTO: 12.43 THOUSAND/UL (ref 4.31–10.16)

## 2025-01-13 PROCEDURE — 36415 COLL VENOUS BLD VENIPUNCTURE: CPT | Performed by: EMERGENCY MEDICINE

## 2025-01-13 PROCEDURE — 99283 EMERGENCY DEPT VISIT LOW MDM: CPT

## 2025-01-13 PROCEDURE — 80048 BASIC METABOLIC PNL TOTAL CA: CPT | Performed by: EMERGENCY MEDICINE

## 2025-01-13 PROCEDURE — 99284 EMERGENCY DEPT VISIT MOD MDM: CPT | Performed by: EMERGENCY MEDICINE

## 2025-01-13 PROCEDURE — 85025 COMPLETE CBC W/AUTO DIFF WBC: CPT | Performed by: EMERGENCY MEDICINE

## 2025-01-13 NOTE — ED PROVIDER NOTES
Time reflects when diagnosis was documented in both MDM as applicable and the Disposition within this note       Time User Action Codes Description Comment    1/13/2025  4:42 PM Dionicio García Add [M54.31] Sciatica of right side           ED Disposition       ED Disposition   Discharge    Condition   Stable    Date/Time   Mon Jan 13, 2025  4:42 PM    Comment   Spencer Vicente discharge to home/self care.                   Assessment & Plan       Medical Decision Making  64-year-old male, presenting with several weeks of numbness and tingling in right lower extremity.  Differential diagnosis includes sciatica, diabetic neuropathy, peripheral vascular disease, cauda equina syndrome among other diagnoses.  Patient able to ambulate without difficulty, no neurologic deficits noted in lower extremities on exam.  Bilateral lower extremities warm with good cap refill and color, no signs of acute vascular deficiency.  Patient symptoms likely related to sciatica, has MRI scheduled.  Patient states he has history of diabetes but has not been following up with primary doctor or taking medications recently.  Labs ordered.    Lab results reviewed and discussed with patient.  Patient is already seeing PT and has scheduled MRI.  Discussed with patient need to follow-up with a primary doctor, information for [facility] provided.  Patient instructed to follow-up or return for any new concerning symptoms.    Amount and/or Complexity of Data Reviewed  Labs: ordered. Decision-making details documented in ED Course.             Medications - No data to display    ED Risk Strat Scores                                              History of Present Illness       Chief Complaint   Patient presents with    Leg Pain     C/o tingling to R knee with some numbness for a few weeks. Hx of diabetes and has not been checking sugars       Past Medical History:   Diagnosis Date    BPH (benign prostatic hyperplasia)     CAD (coronary artery  disease)     Claustrophobia     Colon polyp     Depression     Diabetes mellitus (HCC)     Expressive aphasia     only after anesthesia lasts about one hour    Hyperlipidemia     Hypertension     Migraine     Hx none since 2014    Orthostatic hypotension     Shortness of breath     exertional SOB    Sleep apnea     [place] not wear mask ,ill fitting and noise made it impossible to wear      Past Surgical History:   Procedure Laterality Date    CARDIAC LOOP RECORDER      COLONOSCOPY      CORONARY STENT PLACEMENT N/A mar and oct 2017    x2    DE XCAPSL CTRC RMVL INSJ IO LENS PROSTH W/O ECP Left 8/31/2020    Procedure: EXTRACTION EXTRACAPSULAR CATARACT PHACO INTRAOCULAR LENS (IOL);  Surgeon: Santiago Rolle MD;  Location: [facility] MAIN OR;  Service: Ophthalmology    DE XCAPSL CTRC RMVL INSJ IO LENS PROSTH W/O ECP Right 3/14/2022    Procedure: EXTRACTION EXTRACAPSULAR CATARACT PHACO INTRAOCULAR LENS (IOL);  Surgeon: Santiago Rolle MD;  Location: [facility] MAIN OR;  Service: Ophthalmology      Family History   Problem Relation Age of Onset    Colon cancer Maternal Grandmother     Breast cancer Mother     Other Mother     Heart Valve Disease Father     Leukemia Father     Migraines Sister     Heart failure Family       Social History     Tobacco Use    Smoking status: Some Days     Types: Cigars     Start date: 8/21/1992    Smokeless tobacco: Never    Tobacco comments:     2 a day  was 6 a day for 6 months only   Vaping Use    Vaping status: Never Used   Substance Use Topics    Alcohol use: Yes     Comment: 5 beers a week    Drug use: No      E-Cigarette/Vaping    E-Cigarette Use Never User       E-Cigarette/Vaping Substances      I have reviewed and agree with the history as documented.     64-year-old male, presenting with tingling and numbness in right leg.  Patient states symptoms have been going on for several weeks.  Has had pain in right lower back, currently seeing PT and is scheduled to have MRI done.  Denies any recent  injury.  No weakness in the leg, no swelling or skin changes.  Denies any fevers, no change in urination.      History provided by:  Patient   used: No    Leg Pain  Associated symptoms: no fever        Review of Systems   Constitutional: Negative.  Negative for fever.   Respiratory: Negative.     Cardiovascular: Negative.            Objective       ED Triage Vitals [01/13/25 1530]   Temperature Pulse Blood Pressure Respirations SpO2 Patient Position - Orthostatic VS   (!) 97.2 °F (36.2 °C) 60 127/68 22 95 % Sitting      Temp Source Heart Rate Source BP Location FiO2 (%) Pain Score    Tympanic Monitor Right arm -- 8      Vitals      Date and Time Temp Pulse SpO2 Resp BP Pain Score FACES Pain Rating User   01/13/25 1530 97.2 °F (36.2 °C) 60 95 % 22 127/68 8 -- LS            Physical Exam  Vitals and nursing note reviewed.   Constitutional:       General: He is not in acute distress.  HENT:      Head: Normocephalic and atraumatic.   Cardiovascular:      Rate and Rhythm: Normal rate.   Pulmonary:      Effort: Pulmonary effort is normal.   Abdominal:      Palpations: Abdomen is soft.      Tenderness: There is no abdominal tenderness.   Musculoskeletal:         General: Normal range of motion.      Comments: Bilateral lower extremities with no swelling, no deformity, no tenderness.  No lumbar spine tenderness   Skin:     General: Skin is warm and dry.   Neurological:      General: No focal deficit present.      Mental Status: He is alert and oriented to person, place, and time.      Comments: 5 out of 5 strength bilateral lower extremities, normal dorsiflexion and plantarflexion in bilateral feet against resistance.  Normal sensation to light touch in bilateral lower extremities and feet  Pain with straight leg raise on right         Results Reviewed       Procedure Component Value Units Date/Time    Basic metabolic panel [[id number]]  (Abnormal) Collected: 01/13/25 1622    Lab Status: Final result  Specimen: Blood from Arm, Right Updated: 01/13/25 1642     Sodium 136 mmol/L      Potassium 4.2 mmol/L      Chloride 106 mmol/L      CO2 25 mmol/L      ANION GAP 5 mmol/L      BUN 26 mg/dL      Creatinine 0.92 mg/dL      Glucose 146 mg/dL      Calcium 9.3 mg/dL      eGFR 86 ml/min/1.73sq m     Narrative:      National Kidney Disease Foundation guidelines for Chronic Kidney Disease (CKD):     Stage 1 with normal or high GFR (GFR > 90 mL/min/1.73 square meters)    Stage 2 Mild CKD (GFR = 60-89 mL/min/1.73 square meters)    Stage 3A Moderate CKD (GFR = 45-59 mL/min/1.73 square meters)    Stage 3B Moderate CKD (GFR = 30-44 mL/min/1.73 square meters)    Stage 4 Severe CKD (GFR = 15-29 mL/min/1.73 square meters)    Stage 5 End Stage CKD (GFR <15 mL/min/1.73 square meters)  Note: GFR calculation is accurate only with a steady state creatinine    CBC and differential [[id number]]  (Abnormal) Collected: 01/13/25 1622    Lab Status: Final result Specimen: Blood from Arm, Right Updated: 01/13/25 1627     WBC 12.43 Thousand/uL      RBC 5.48 Million/uL      Hemoglobin 16.9 g/dL      Hematocrit 49.0 %      MCV 89 fL      MCH 30.8 pg      MCHC 34.5 g/dL      RDW 12.8 %      MPV 10.5 fL      Platelets 233 Thousands/uL      nRBC 0 /100 WBCs      Segmented % 67 %      Immature Grans % 0 %      Lymphocytes % 23 %      Monocytes % 8 %      Eosinophils Relative 1 %      Basophils Relative 1 %      Absolute Neutrophils 8.27 Thousands/µL      Absolute Immature Grans 0.04 Thousand/uL      Absolute Lymphocytes 2.91 Thousands/µL      Absolute Monocytes 0.96 Thousand/µL      Eosinophils Absolute 0.18 Thousand/µL      Basophils Absolute 0.07 Thousands/µL             No orders to display       Procedures    ED Medication and Procedure Management   Prior to Admission Medications   Prescriptions Last Dose Informant Patient Reported? Taking?   Blood Glucose Monitoring Suppl (ONE TOUCH ULTRA MINI) w/Device KIT  Self Yes No   Empagliflozin  (Jardiance) 25 MG TABS  Self Yes No   Sig: Take 25 mg by mouth in the morning     LORazepam (Ativan) 0.5 mg tablet   No No   Sig: Take 1 tablet (0.5 mg total) by mouth once in imaging for anxiety for up to 1 dose   ONE TOUCH ULTRA TEST test strip  Self Yes No   Sig: TEST twice a day   ONETOUCH DELICA LANCETS FINE MISC  Self Yes No   Sig: TEST twice a day   aspirin 81 mg chewable tablet  Self Yes No   Sig: Chew 81 mg daily at bedtime    atorvastatin (LIPITOR) 40 mg tablet  Self Yes No   Sig: Take 40 mg by mouth daily at bedtime   isosorbide mononitrate (IMDUR) 30 mg 24 hr tablet  Self Yes No   Sig: Take 30 mg by mouth daily   losartan (COZAAR) 25 mg tablet  Self Yes No   Sig: Take 25 mg by mouth daily at bedtime    methocarbamol (ROBAXIN) 500 mg tablet   No No   Sig: Take 1 tablet (500 mg total) by mouth 2 (two) times a day for 4 days   Patient not taking: Reported on 12/10/2024   nitroglycerin (NITROSTAT) 0.4 mg SL tablet  Self Yes No   Sig: Place 0.4 mg under the tongue every 5 (five) minutes as needed for chest pain   psyllium (METAMUCIL) 58.6 % packet  Self Yes No   Sig: Take 1 packet by mouth daily   sertraline (ZOLOFT) 50 mg tablet  Self No No   Sig: Take 1.5 tablets (75 mg total) by mouth daily   sotalol (BETAPACE) 80 mg tablet  Self Yes No   Si mg 2 (two) times a day        Facility-Administered Medications: None     Discharge Medication List as of 2025  4:43 PM        CONTINUE these medications which have NOT CHANGED    Details   aspirin 81 mg chewable tablet Chew 81 mg daily at bedtime , Starting Tue 3/20/2018, Historical Med      atorvastatin (LIPITOR) 40 mg tablet Take 40 mg by mouth daily at bedtime, Historical Med      Blood Glucose Monitoring Suppl (ONE TOUCH ULTRA MINI) w/Device KIT Starting Thu 5/10/2018, Historical Med      Empagliflozin (Jardiance) 25 MG TABS Take 25 mg by mouth in the morning  , Historical Med      isosorbide mononitrate (IMDUR) 30 mg 24 hr tablet Take 30 mg by mouth  daily, Starting Mon 3/18/2024, Historical Med      LORazepam (Ativan) 0.5 mg tablet Take 1 tablet (0.5 mg total) by mouth once in imaging for anxiety for up to 1 dose, Starting Wed 12/11/2024, Normal      losartan (COZAAR) 25 mg tablet Take 25 mg by mouth daily at bedtime , Historical Med      methocarbamol (ROBAXIN) 500 mg tablet Take 1 tablet (500 mg total) by mouth 2 (two) times a day for 4 days, Starting Thu 10/31/2024, Until Mon 11/4/2024, Normal      nitroglycerin (NITROSTAT) 0.4 mg SL tablet Place 0.4 mg under the tongue every 5 (five) minutes as needed for chest pain, Historical Med      ONE TOUCH ULTRA TEST test strip TEST twice a day, Historical Med      ONETOUCH DELICA LANCETS FINE MISC TEST twice a day, Historical Med      psyllium (METAMUCIL) 58.6 % packet Take 1 packet by mouth daily, Historical Med      sertraline (ZOLOFT) 50 mg tablet Take 1.5 tablets (75 mg total) by mouth daily, Starting Tue 11/12/2024, Normal      sotalol (BETAPACE) 80 mg tablet 80 mg 2 (two) times a day  , Starting Wed 5/15/2019, Historical Med           No discharge procedures on file.  ED SEPSIS DOCUMENTATION   Time reflects when diagnosis was documented in both MDM as applicable and the Disposition within this note       Time User Action Codes Description Comment    1/13/2025  4:42 PM Dionicio García Add [M54.31] Sciatica of right side                  Dionicio García MD  01/13/25 7482

## 2025-01-22 ENCOUNTER — TELEPHONE (OUTPATIENT)
Dept: PSYCHIATRY | Facility: CLINIC | Age: 65
End: 2025-01-22

## 2025-01-22 ENCOUNTER — OFFICE VISIT (OUTPATIENT)
Age: 65
End: 2025-01-22

## 2025-01-22 VITALS
RESPIRATION RATE: 16 BRPM | HEART RATE: 59 BPM | OXYGEN SATURATION: 97 % | WEIGHT: 240 LBS | BODY MASS INDEX: 37.59 KG/M2 | DIASTOLIC BLOOD PRESSURE: 70 MMHG | SYSTOLIC BLOOD PRESSURE: 136 MMHG | TEMPERATURE: 97 F

## 2025-01-22 DIAGNOSIS — Z12.11 COLON CANCER SCREENING: ICD-10-CM

## 2025-01-22 DIAGNOSIS — M25.562 ACUTE PAIN OF BOTH KNEES: ICD-10-CM

## 2025-01-22 DIAGNOSIS — M54.41 ACUTE RIGHT-SIDED LOW BACK PAIN WITH RIGHT-SIDED SCIATICA: ICD-10-CM

## 2025-01-22 DIAGNOSIS — Z12.5 PROSTATE CANCER SCREENING: ICD-10-CM

## 2025-01-22 DIAGNOSIS — E11.9 ENCOUNTER FOR DIABETIC FOOT EXAM (HCC): ICD-10-CM

## 2025-01-22 DIAGNOSIS — Z11.59 NEED FOR HEPATITIS C SCREENING TEST: ICD-10-CM

## 2025-01-22 DIAGNOSIS — Z76.89 ENCOUNTER TO ESTABLISH CARE: Primary | ICD-10-CM

## 2025-01-22 DIAGNOSIS — E11.9 TYPE 2 DIABETES MELLITUS WITHOUT COMPLICATION, UNSPECIFIED WHETHER LONG TERM INSULIN USE (HCC): ICD-10-CM

## 2025-01-22 DIAGNOSIS — M25.561 ACUTE PAIN OF BOTH KNEES: ICD-10-CM

## 2025-01-22 DIAGNOSIS — Z11.4 SCREENING FOR HIV (HUMAN IMMUNODEFICIENCY VIRUS): ICD-10-CM

## 2025-01-22 LAB — SL AMB POCT HEMOGLOBIN AIC: 7.3 (ref ?–6.5)

## 2025-01-22 PROCEDURE — 83036 HEMOGLOBIN GLYCOSYLATED A1C: CPT | Performed by: FAMILY MEDICINE

## 2025-01-22 PROCEDURE — 99203 OFFICE O/P NEW LOW 30 MIN: CPT | Performed by: FAMILY MEDICINE

## 2025-01-22 RX ORDER — BLOOD-GLUCOSE METER
KIT MISCELLANEOUS
Qty: 1 KIT | Refills: 0 | Status: SHIPPED | OUTPATIENT
Start: 2025-01-22

## 2025-01-22 RX ORDER — BLOOD SUGAR DIAGNOSTIC
STRIP MISCELLANEOUS
Qty: 100 EACH | Refills: 3 | Status: SHIPPED | OUTPATIENT
Start: 2025-01-22

## 2025-01-22 RX ORDER — LANCETS 33 GAUGE
EACH MISCELLANEOUS
Qty: 100 EACH | Refills: 3 | Status: SHIPPED | OUTPATIENT
Start: 2025-01-22

## 2025-01-22 NOTE — PATIENT INSTRUCTIONS
"Patient Education     Type 2 diabetes   The Basics   Written by the doctors and editors at [facility]   What is type 2 diabetes? -- This is a disorder that disrupts the way the body uses sugar. It is sometimes called type 2 diabetes mellitus.  All of the cells in the body need sugar to work normally. Sugar gets into the cells with the help of a hormone called insulin. Insulin is made by the pancreas, an organ in the belly. If there is not enough insulin, or if cells in the body don't respond normally to insulin, sugar builds up in the blood. That is what happens to people with diabetes.  There are 2 different types of diabetes:   In type 1 diabetes, the pancreas makes little or no insulin.   In type 2 diabetes, the pancreas still makes some insulin, but the cells in the body stop responding normally. Eventually, the pancreas cannot make enough insulin to keep up.  Having excess body weight or obesity increases a person's risk of developing type 2 diabetes. But people without excess body weight can get diabetes, too.  What are the symptoms of type 2 diabetes? -- Type 2 diabetes usually causes no symptoms. When symptoms do happen, they include:   Needing to urinate often   Intense thirst   Blurry vision  Can diabetes lead to other health problems? -- Yes. Type 2 diabetes might not make you feel sick. But if it is not managed, it can lead to serious problems over time, such as:   Heart attacks   Strokes   Kidney disease   Vision problems (or even blindness)   Pain or loss of feeling in the hands and feet   Needing to have fingers, toes, or other body parts removed (amputated)  How do I know if I have type 2 diabetes? -- Your doctor or nurse can do a blood test. There are 2 tests that can be used for this. Both involve measuring the amount of sugar in your blood, called your \"blood sugar\" or \"blood glucose\":   One of the tests measures your blood sugar at the time the blood sample is taken. This test is done in the " "morning. You can't eat or drink anything except water for at least 8 hours before the test.   The other test shows what your average blood sugar has been for the past 2 to 3 months. This blood test is called \"hemoglobin A1C\" or just \"A1C.\" It can be checked at any time of the day, even if you have recently eaten.  How is type 2 diabetes treated? -- The goals of treatment are to manage your blood sugar and lower the risk of future problems that can happen in people with diabetes.  Treatment might include:   Lifestyle changes - This is an important part of managing diabetes. It includes eating healthy foods and getting plenty of physical activity.   Medicines - There are a few medicines that help lower blood sugar. Some people need to take pills that help the body make more insulin or that help insulin do its job. Others need insulin shots.  Depending on what medicines you take, you might need to check your blood sugar regularly at home. But not everyone with type 2 diabetes needs to do this. Your doctor or nurse will tell you if you should be checking your blood sugar, and when and how to do this.  Sometimes, people with type 2 diabetes also need medicines to help prevent problems caused by the disease. For instance, medicines used to lower blood pressure can reduce the chances of a heart attack or stroke.   General medical care - It's also important to take care of other areas of your health. This includes watching your blood pressure and cholesterol levels. You should also get certain vaccines, such as vaccines to protect against the flu and coronavirus disease 2019 (\"COVID-19\"). Some people also need a vaccine to prevent pneumonia.  Can type 2 diabetes be prevented? -- Yes. To lower your chances of getting type 2 diabetes, the most important thing you can do is eat a healthy diet and get plenty of physical activity. This can help you lose weight if you are overweight. But eating well and being active are also good " for your overall health. Even gentle activity, like walking, has benefits.  If you smoke, quitting can also lower your risk of type 2 diabetes. Quitting smoking can be difficult, but your doctor or nurse can help.  All topics are updated as new evidence becomes available and our peer review process is complete.  This topic retrieved from [employer] on: Apr 24, 2024.  Topic 79660 Version 23.0  Release: 32.3.2 - C32.113  © 2024 UpToDate, Inc. and/or its affiliates. All rights reserved.  Consumer Information Use and Disclaimer   Disclaimer: This generalized information is a limited summary of diagnosis, treatment, and/or medication information. It is not meant to be comprehensive and should be used as a tool to help the user understand and/or assess potential diagnostic and treatment options. It does NOT include all information about conditions, treatments, medications, side effects, or risks that may apply to a specific patient. It is not intended to be medical advice or a substitute for the medical advice, diagnosis, or treatment of a health care provider based on the health care provider's examination and assessment of a patient's specific and unique circumstances. Patients must speak with a health care provider for complete information about their health, medical questions, and treatment options, including any risks or benefits regarding use of medications. This information does not endorse any treatments or medications as safe, effective, or approved for treating a specific patient. UpToDate, Inc. and its affiliates disclaim any warranty or liability relating to this information or the use thereof.The use of this information is governed by the Terms of Use, available at https://www.wolterskluwer.com/en/know/clinical-effectiveness-terms. 2024© UpToDate, Inc. and its affiliates and/or licensors. All rights reserved.  Copyright   © 2024 UpToDate, Inc. and/or its affiliates. All rights reserved.

## 2025-01-22 NOTE — PROGRESS NOTES
Name: Spencer Vicente      : 1960      MRN: [id number]  Encounter Provider: Glenna Keene MD  Encounter Date: 2025   Encounter department: [facility]  :  Assessment & Plan  Encounter to establish care  Prior PCP retired  Establishing care in office       Type 2 diabetes mellitus without complication, unspecified whether long term insulin use (HCC)    Lab Results   Component Value Date    HGBA1C 7.3 (A) 2025   Chronic, appears well-controlled on Jardiance  Is not on any other medications  Hemoglobin A1c stable at 7.3  Diabetic iris exam and diabetic foot exam performed in office today    Orders:    IRIS Diabetic eye exam    POCT hemoglobin A1c    Albumin / creatinine urine ratio; Future    Blood Glucose Monitoring Suppl (OneTouch Verio Reflect) w/Device KIT; Check blood sugars once daily. Please substitute with appropriate alternative as covered by patient's insurance. Dx: E11.65    glucose blood (OneTouch Verio) test strip; Check blood sugars once daily. Please substitute with appropriate alternative as covered by patient's insurance. Dx: E11.65    OneTouch Delica Lancets 33G MISC; Check blood sugars once daily. Please substitute with appropriate alternative as covered by patient's insurance. Dx: E11.65    Lipid Panel with Direct LDL reflex; Future    TSH, 3rd generation with Free T4 reflex; Future    CBC and differential; Future    Need for hepatitis C screening test  Hep C ordered  Orders:    Hepatitis C antibody; Future    Prostate cancer screening  PSA screening ordered  Orders:    PSA, Total Screen; Future    Screening for HIV (human immunodeficiency virus)  HIV screening ordered  Orders:    HIV 1/2 AG/AB w Reflex [facility] for 2 yr old and above; Future    Colon cancer screening  And need for colon cancer screening  Referral to GI placed  Orders:    Ambulatory Referral to Gastroenterology; Future    Acute pain of both knees  Notes acute pain of both  knees  Mild osteoarthritis  Physical therapy referral placed  Orders:    Ambulatory Referral to Physical Therapy; Future    Acute right-sided low back pain with right-sided sciatica  Continues to have back pain  Physical therapy referral placed  Orders:    Ambulatory Referral to Physical Therapy; Future           History of Present Illness   Spencer is a 64 year old male who presents to [facility] to establish care. He notes that his PCP recently retired and he was seeking a new provider.  He now notes that he has multiple chronic conditions including hypertension, type 2 diabetes, CAD, back pain and knee pain.  He does follow with a cardiologist in [place] and follows with Dr. Bell for his pain in his back and knees.  Additionally, he would like to go back to physical therapy.      Review of Systems   Constitutional:  Negative for chills and fever.   HENT:  Negative for ear pain and sore throat.    Eyes:  Negative for pain and visual disturbance.   Respiratory:  Negative for cough and shortness of breath.    Cardiovascular:  Negative for chest pain and palpitations.   Gastrointestinal:  Negative for abdominal pain and vomiting.   Genitourinary:  Negative for dysuria and hematuria.   Musculoskeletal:  Positive for arthralgias (bilateral knee pain) and back pain.   Skin:  Negative for color change and rash.   Neurological:  Negative for seizures and syncope.   All other systems reviewed and are negative.      Objective   There were no vitals taken for this visit.     Physical Exam  Vitals and nursing note reviewed.   Constitutional:       General: He is not in acute distress.     Appearance: He is well-developed.   HENT:      Head: Normocephalic and atraumatic.      Right Ear: External ear normal.      Left Ear: External ear normal.      Nose: Nose normal.      Mouth/Throat:      Mouth: Mucous membranes are moist.   Eyes:      Extraocular Movements: Extraocular movements intact.       Conjunctiva/sclera: Conjunctivae normal.   Cardiovascular:      Rate and Rhythm: Normal rate and regular rhythm.      Pulses: no weak pulses.           Dorsalis pedis pulses are 1+ on the right side and 1+ on the left side.        Posterior tibial pulses are 1+ on the right side and 1+ on the left side.      Heart sounds: No murmur heard.  Pulmonary:      Effort: Pulmonary effort is normal. No respiratory distress.      Breath sounds: Normal breath sounds.   Abdominal:      Palpations: Abdomen is soft.      Tenderness: There is no abdominal tenderness.   Musculoskeletal:         General: No swelling.      Cervical back: Normal range of motion and neck supple.   Feet:      Right foot:      Skin integrity: No ulcer, skin breakdown, erythema, warmth, callus or dry skin.      Left foot:      Skin integrity: No ulcer, skin breakdown, erythema, warmth, callus or dry skin.   Skin:     General: Skin is warm and dry.      Capillary Refill: Capillary refill takes less than 2 seconds.   Neurological:      Mental Status: He is alert.   Psychiatric:         Mood and Affect: Mood normal.       Patient's shoes and socks removed.    Right Foot/Ankle   Right Foot Inspection  Skin Exam: skin normal and skin intact. No dry skin, no warmth, no callus, no erythema, no maceration, no abnormal color, no pre-ulcer, no ulcer and no callus.     Toe Exam: No swelling, no tenderness, erythema and  no right toe deformity    Sensory   Vibration: intact  Proprioception: intact  Monofilament testing: intact    Vascular  Capillary refills: < 3 seconds  The right DP pulse is 1+. The right PT pulse is 1+.     Left Foot/Ankle  Left Foot Inspection  Skin Exam: skin normal and skin intact. No dry skin, no warmth, no erythema, no maceration, normal color, no pre-ulcer, no ulcer and no callus.     Toe Exam: No swelling, no tenderness, no erythema and no left toe deformity.     Sensory   Vibration: intact  Proprioception: intact  Monofilament testing:  intact    Vascular  Capillary refills: < 3 seconds  The left DP pulse is 1+. The left PT pulse is 1+.     Assign Risk Category  No deformity present  No loss of protective sensation  No weak pulses  Risk: 0

## 2025-01-22 NOTE — ASSESSMENT & PLAN NOTE
Lab Results   Component Value Date    HGBA1C 7.3 (A) 01/22/2025   Chronic, appears well-controlled on Jardiance  Is not on any other medications  Hemoglobin A1c stable at 7.3  Diabetic iris exam and diabetic foot exam performed in office today    Orders:    IRIS Diabetic eye exam    POCT hemoglobin A1c    Albumin / creatinine urine ratio; Future    Blood Glucose Monitoring Suppl (OneTouch Verio Reflect) w/Device KIT; Check blood sugars once daily. Please substitute with appropriate alternative as covered by patient's insurance. Dx: E11.65    glucose blood (OneTouch Verio) test strip; Check blood sugars once daily. Please substitute with appropriate alternative as covered by patient's insurance. Dx: E11.65    OneTouch Delica Lancets 33G MISC; Check blood sugars once daily. Please substitute with appropriate alternative as covered by patient's insurance. Dx: E11.65    Lipid Panel with Direct LDL reflex; Future    TSH, 3rd generation with Free T4 reflex; Future    CBC and differential; Future

## 2025-01-23 ENCOUNTER — TELEPHONE (OUTPATIENT)
Dept: ADMINISTRATIVE | Facility: OTHER | Age: 65
End: 2025-01-23

## 2025-01-23 ENCOUNTER — TELEPHONE (OUTPATIENT)
Dept: PSYCHIATRY | Facility: CLINIC | Age: 65
End: 2025-01-23

## 2025-01-23 NOTE — TELEPHONE ENCOUNTER
Pt was scheduled for today at 12 but he had [employer] PT has [employer] but is not eff till 2/1 he was r/s to 2/4/2025. He is ok with medication till then.

## 2025-01-23 NOTE — LETTER
Diabetic Eye Exam Form    Date Requested: 25  Patient: Spencer Vicente  Patient : 1960   Referring Provider: No primary care provider on file.      DIABETIC Eye Exam Date _______________________________      Type of Exam MUST be documented for Diabetic Eye Exams. Please CHECK ONE.     Retinal Exam       Dilated Retinal Exam       OCT       Optomap-Iris Exam      Fundus Photography       Left Eye - Please check Retinopathy or No Retinopathy        Exam did show retinopathy    Exam did not show retinopathy       Right Eye - Please check Retinopathy or No Retinopathy       Exam did show retinopathy    Exam did not show retinopathy       Comments __________________________________________________________    Practice Providing Exam ______________________________________________    Exam Performed By (print name) _______________________________________      Provider Signature ___________________________________________________      These reports are needed for  compliance.  Please fax this completed form and a copy of the Diabetic Eye Exam report to our office located at [address] [place] [address] as soon as possible via Fax [phone] attention Leroy: Phone [phone]  We thank you for your assistance in treating our mutual patient.

## 2025-01-23 NOTE — LETTER
Diabetic Eye Exam Form    Date Requested: 25  Patient: Spencer Vicente  Patient : 1960   Referring Provider: No primary care provider on file.      DIABETIC Eye Exam Date _______________________________      Type of Exam MUST be documented for Diabetic Eye Exams. Please CHECK ONE.     Retinal Exam       Dilated Retinal Exam       OCT       Optomap-Iris Exam      Fundus Photography       Left Eye - Please check Retinopathy or No Retinopathy        Exam did show retinopathy    Exam did not show retinopathy       Right Eye - Please check Retinopathy or No Retinopathy       Exam did show retinopathy    Exam did not show retinopathy       Comments __________________________________________________________    Practice Providing Exam ______________________________________________    Exam Performed By (print name) _______________________________________      Provider Signature ___________________________________________________      These reports are needed for  compliance.  Please fax this completed form and a copy of the Diabetic Eye Exam report to our office located at [address] [place] [address] as soon as possible via Fax [phone] attention Leroy: Phone [phone]  We thank you for your assistance in treating our mutual patient.

## 2025-01-23 NOTE — TELEPHONE ENCOUNTER
Upon review of the In Basket request and the patient's chart, initial outreach has been made via fax to facility. Please see Contacts section for details.     Thank you  Leroy Fishman MA

## 2025-01-23 NOTE — TELEPHONE ENCOUNTER
----- Message from Iva WELLS sent at 1/22/2025 10:48 AM EST -----  Regarding: Iris exam  01/22/25 10:52 AM    Hello, our patient Spencer Vicente has had Diabetic Eye Exam completed/performed. Please assist in updating the patient chart by making an External outreach to Dr. Lee facility located in [place], [place]. The date of service is somewhere in the summer months, patient unsure of exact date..    Thank you,  Iva Westfall RN  [facility]

## 2025-01-24 ENCOUNTER — APPOINTMENT (OUTPATIENT)
Dept: LAB | Facility: CLINIC | Age: 65
End: 2025-01-24
Payer: MEDICARE

## 2025-01-24 DIAGNOSIS — Z11.59 NEED FOR HEPATITIS C SCREENING TEST: ICD-10-CM

## 2025-01-24 DIAGNOSIS — Z12.5 PROSTATE CANCER SCREENING: ICD-10-CM

## 2025-01-24 DIAGNOSIS — E11.9 TYPE 2 DIABETES MELLITUS WITHOUT COMPLICATION, UNSPECIFIED WHETHER LONG TERM INSULIN USE (HCC): ICD-10-CM

## 2025-01-24 DIAGNOSIS — Z11.4 SCREENING FOR HIV (HUMAN IMMUNODEFICIENCY VIRUS): ICD-10-CM

## 2025-01-24 LAB
BASOPHILS # BLD AUTO: 0.05 THOUSANDS/ΜL (ref 0–0.1)
BASOPHILS NFR BLD AUTO: 1 % (ref 0–1)
CHOLEST SERPL-MCNC: 130 MG/DL (ref ?–200)
CREAT UR-MCNC: 32 MG/DL
EOSINOPHIL # BLD AUTO: 0.16 THOUSAND/ΜL (ref 0–0.61)
EOSINOPHIL NFR BLD AUTO: 2 % (ref 0–6)
ERYTHROCYTE [DISTWIDTH] IN BLOOD BY AUTOMATED COUNT: 12.7 % (ref 11.6–15.1)
HCT VFR BLD AUTO: 51.9 % (ref 36.5–49.3)
HDLC SERPL-MCNC: 40 MG/DL
HGB BLD-MCNC: 17.3 G/DL (ref 12–17)
IMM GRANULOCYTES # BLD AUTO: 0.03 THOUSAND/UL (ref 0–0.2)
IMM GRANULOCYTES NFR BLD AUTO: 0 % (ref 0–2)
LDLC SERPL CALC-MCNC: 61 MG/DL (ref 0–100)
LYMPHOCYTES # BLD AUTO: 2.15 THOUSANDS/ΜL (ref 0.6–4.47)
LYMPHOCYTES NFR BLD AUTO: 25 % (ref 14–44)
MCH RBC QN AUTO: 30.4 PG (ref 26.8–34.3)
MCHC RBC AUTO-ENTMCNC: 33.3 G/DL (ref 31.4–37.4)
MCV RBC AUTO: 91 FL (ref 82–98)
MICROALBUMIN UR-MCNC: 12 MG/L
MICROALBUMIN/CREAT 24H UR: 38 MG/G CREATININE (ref 0–30)
MONOCYTES # BLD AUTO: 0.68 THOUSAND/ΜL (ref 0.17–1.22)
MONOCYTES NFR BLD AUTO: 8 % (ref 4–12)
NEUTROPHILS # BLD AUTO: 5.61 THOUSANDS/ΜL (ref 1.85–7.62)
NEUTS SEG NFR BLD AUTO: 64 % (ref 43–75)
NRBC BLD AUTO-RTO: 0 /100 WBCS
PLATELET # BLD AUTO: 197 THOUSANDS/UL (ref 149–390)
PMV BLD AUTO: 11.6 FL (ref 8.9–12.7)
PSA SERPL-MCNC: 3.51 NG/ML (ref 0–4)
RBC # BLD AUTO: 5.69 MILLION/UL (ref 3.88–5.62)
TRIGL SERPL-MCNC: 144 MG/DL (ref ?–150)
TSH SERPL DL<=0.05 MIU/L-ACNC: 2.23 UIU/ML (ref 0.45–4.5)
WBC # BLD AUTO: 8.68 THOUSAND/UL (ref 4.31–10.16)

## 2025-01-24 PROCEDURE — 86803 HEPATITIS C AB TEST: CPT

## 2025-01-24 PROCEDURE — 82043 UR ALBUMIN QUANTITATIVE: CPT

## 2025-01-24 PROCEDURE — 80061 LIPID PANEL: CPT

## 2025-01-24 PROCEDURE — 36415 COLL VENOUS BLD VENIPUNCTURE: CPT

## 2025-01-24 PROCEDURE — 87389 HIV-1 AG W/HIV-1&-2 AB AG IA: CPT

## 2025-01-24 PROCEDURE — G0103 PSA SCREENING: HCPCS

## 2025-01-24 PROCEDURE — 84443 ASSAY THYROID STIM HORMONE: CPT

## 2025-01-24 PROCEDURE — 82570 ASSAY OF URINE CREATININE: CPT

## 2025-01-24 PROCEDURE — 85025 COMPLETE CBC W/AUTO DIFF WBC: CPT

## 2025-01-25 ENCOUNTER — RESULTS FOLLOW-UP (OUTPATIENT)
Dept: OTHER | Facility: HOSPITAL | Age: 65
End: 2025-01-25

## 2025-01-25 DIAGNOSIS — D58.2 ELEVATED HEMOGLOBIN (HCC): Primary | ICD-10-CM

## 2025-01-25 LAB
HCV AB SER QL: NORMAL
HIV 1+2 AB+HIV1 P24 AG SERPL QL IA: NORMAL
HIV 2 AB SERPL QL IA: NORMAL
HIV1 AB SERPL QL IA: NORMAL
HIV1 P24 AG SERPL QL IA: NORMAL

## 2025-01-27 NOTE — TELEPHONE ENCOUNTER
As a follow-up, a second attempt has been made for outreach via fax to facility. Please see Contacts section for details.    Thank you  Leroy Fishman MA

## 2025-01-28 NOTE — TELEPHONE ENCOUNTER
Upon review of the In Basket request we were able to locate, review, and update the patient chart as requested for Diabetic Eye Exam.    Any additional questions or concerns should be emailed to the Practice Liaisons via the appropriate education email address, please do not reply via In Basket.    Thank you  Leroy Fishman MA   [facility]

## 2025-01-30 ENCOUNTER — TELEPHONE (OUTPATIENT)
Age: 65
End: 2025-01-30

## 2025-01-30 DIAGNOSIS — F41.1 GAD (GENERALIZED ANXIETY DISORDER): ICD-10-CM

## 2025-01-30 NOTE — TELEPHONE ENCOUNTER
Patient called and asked id we can fax his lab results to his Cardiologist Keaton Lyons [phone]. Printed and faxed

## 2025-02-04 ENCOUNTER — OFFICE VISIT (OUTPATIENT)
Dept: PSYCHIATRY | Facility: CLINIC | Age: 65
End: 2025-02-04
Payer: COMMERCIAL

## 2025-02-04 DIAGNOSIS — F41.1 GAD (GENERALIZED ANXIETY DISORDER): Primary | ICD-10-CM

## 2025-02-04 DIAGNOSIS — F42.3 HOARDING DISORDER: ICD-10-CM

## 2025-02-04 PROCEDURE — G2211 COMPLEX E/M VISIT ADD ON: HCPCS | Performed by: PHYSICIAN ASSISTANT

## 2025-02-04 PROCEDURE — 99214 OFFICE O/P EST MOD 30 MIN: CPT | Performed by: PHYSICIAN ASSISTANT

## 2025-02-04 NOTE — PSYCH
This note was not shared with the patient due to reasonable likelihood of causing patient harm    PROGRESS NOTE        [facility]      Name and Date of Birth:  Spencer Vicente 64 y.o. 1960 MRN: [id number]      Visit Date: 02/04/25       Reason for visit is   Chief Complaint   Patient presents with    Follow-up    Medication Management     Assessment & Plan  JUAN (generalized anxiety disorder)  Stable - continue sertraline 75 mg qd; continue with outside therapists; f/u in 3 months         Hoarding disorder  Stable - continue sertraline 75 mg qd; continue with outside therapists; f/u in 3 months            SUBJECTIVE:    Spencer Vicente is a sabino 64 y.o. male with a history of Generalized Anxiety Disorder and OCD who presents today for follow-up and medication management. Generally he feels the sertraline is still working well for anxiety and OCD sx. He states the is less worried about people judging him for his past and can go out of the house and run errands more easily. He also reports fewer intrusive thoughts. He has not been as good about dealing with the cleaning of the house but states that is in part due to hurting his back (improving now). He is still grieving his mom but feels that he is managing this fairly well and will feel sad at times but is not sad all the time.     He denies any suicidal ideation, intent or plan at present; denies any homicidal ideation, intent or plan at present.    He denies any auditory hallucinations, denies any auditory hallucinations, denies any delusions.    He denies any side effects from current psychiatric medications.    HPI ROS Appetite Changes and Sleep:     He reports adequate number of sleep hours, adequate appetite, adequate energy level    Current Rating Scores:     None completed today.     Review Of Systems:    Mood euthymic   Behavior appropriate, cooperative, and calm   Thought Content normal   General normal     Personality no change in personality   Other Psych Symptoms normal   Constitutional as noted in HPI   ENT as noted in HPI   Cardiovascular as noted in HPI   Respiratory as noted in HPI   Gastrointestinal as noted in HPI   Genitourinary as noted in HPI   Musculoskeletal as noted in HPI   Integumentary as noted in HPI   Neurological as noted in HPI   Endocrine negative   Other Symptoms none, all other systems are negative     Family Psychiatric History:     Family History   Problem Relation Age of Onset    Colon cancer Maternal Grandmother     Breast cancer Mother     Other Mother     Heart Valve Disease Father     Leukemia Father     Migraines Sister     Heart failure Family      Social/Substance Abuse History:    Social History     Socioeconomic History    Marital status: Single     Spouse name: Not on file    Number of children: Not on file    Years of education: Not on file    Highest education level: Not on file   Occupational History    Not on file   Tobacco Use    Smoking status: Some Days     Types: Cigars     Start date: 8/21/1992    Smokeless tobacco: Never    Tobacco comments:     2 a day  was 6 a day for 6 months only   Vaping Use    Vaping status: Never Used   Substance and Sexual Activity    Alcohol use: Yes     Comment: 5 beers a week    Drug use: No    Sexual activity: Not on file   Other Topics Concern    Not on file   Social History Narrative    Not on file     Social Drivers of Health     Financial Resource Strain: Low Risk  (1/22/2025)    Overall Financial Resource Strain (CARDIA)     Difficulty of Paying Living Expenses: Not hard at all   Food Insecurity: No Food Insecurity (1/22/2025)    Hunger Vital Sign     Worried About Running Out of Food in the Last Year: Never true     Ran Out of Food in the Last Year: Never true   Transportation Needs: No Transportation Needs (1/22/2025)    PRAPARE - Transportation     Lack of Transportation (Medical): No     Lack of Transportation (Non-Medical): No    Physical Activity: Not on file   Stress: Not on file   Social Connections: Not on file   Intimate Partner Violence: Not on file   Housing Stability: Low Risk  (1/22/2025)    Housing Stability Vital Sign     Unable to Pay for Housing in the Last Year: No     Number of Times Moved in the Last Year: 0     Homeless in the Last Year: No     The following portions of the patient's history were reviewed and updated as appropriate: past family history, past medical history, past social history, past surgical history and problem list.    OBJECTIVE:     Mental Status Evaluation:  Appearance:  dressed appropriately, adequate grooming, looks stated age   Behavior:  pleasant, cooperative, calm, interacts appropriately with this writer   Speech:  normal rate, normal volume, normal pitch   Mood:  less depressed   Affect:  mood-congruent   Thought Process:  organized, logical, coherent   Associations: intact associations   Thought Content:  no overt delusions, no paranoia noted on exam   Perceptual Disturbances: no auditory hallucinations, no visual hallucinations   Risk Potential: Suicidal ideation - None  Homicidal ideation - None  Potential for aggression - No   Sensorium:  oriented to person, place, and time/date   Memory:  recent and remote memory grossly intact   Consciousness:  alert and awake   Attention/Concentration: attention span and concentration are age appropriate   Insight:  age appropriate   Judgment: age appropriate   Gait/Station: normal gait/station   Motor Activity: no abnormal movements     Laboratory Results: I have personally reviewed all pertinent laboratory/tests results    Suicide/Homicide Risk Assessment:    Risk of Harm to Self:  The following ratings are based on assessment at the time of the interview  Based on today's assessment, Spencer presents the following risk of harm to self: none    Risk of Harm to Others:  The following ratings are based on assessment at the time of the interview  Based on  today's assessment, Spencer presents the following risk of harm to others: none    The following interventions are recommended: no intervention changes needed    Assessment/Plan:      Thankfully he still feels sertraline is working well for anxiety and OCD sx and he does not want to change medications which is reasonable. He is court mandated to see a therapist and a psychiatrist (court mandated therapist is Manish Prince LCSW). He will continue to work with Spencer Waters, PhD, LPC, for therapy as well. We have discussed his safety plan and he agrees that if he experience unsafe thoughts that he will reach out to his supports including this office, the suicide hotline, and emergency services if necessary. Spencer is aware of non-emergent and emergent mental health resources. They are able to contract for their own safety at this time.    Will follow up in 3 months. Patient is aware to call the office if questions or concerns arise sooner.      Diagnoses and all orders for this visit:    JUAN (generalized anxiety disorder)    Hoarding disorder        Treatment Recommendations/Precautions:    Continue current medications:     - sertraline 75 mg qd    Does not want any medication changes  Aware of 24 hour and weekend coverage for urgent situations accessed by calling [facility] main practice number  Medication management every 3 months  Continue psychotherapy with own therapist  I am scheduling this patient out for greater than 3 months: No    Medications Risks/Benefits      Risks, Benefits And Possible Side Effects Of Medications:    Risks, benefits, and possible side effects of medications explained to Spencer and he verbalizes understanding and agreement for treatment.    Controlled Medication Discussion:     Not applicable    Psychotherapy Provided:     Individual psychotherapy provided: No    Treatment Plan:    Completed and signed during the session: Not applicable - Treatment Plan not due at this  session    Visit Time    Visit Start Time:  3:00 PM  Visit End Time:  3:15 PM  Total Visit Duration:  15 minutes    Silke Saldivar 02/04/25

## 2025-02-12 ENCOUNTER — CONSULT (OUTPATIENT)
Age: 65
End: 2025-02-12
Payer: COMMERCIAL

## 2025-02-12 ENCOUNTER — TELEPHONE (OUTPATIENT)
Age: 65
End: 2025-02-12

## 2025-02-12 VITALS
BODY MASS INDEX: 38.3 KG/M2 | HEIGHT: 67 IN | DIASTOLIC BLOOD PRESSURE: 77 MMHG | WEIGHT: 244 LBS | HEART RATE: 61 BPM | SYSTOLIC BLOOD PRESSURE: 155 MMHG

## 2025-02-12 DIAGNOSIS — K21.9 GASTROESOPHAGEAL REFLUX DISEASE, UNSPECIFIED WHETHER ESOPHAGITIS PRESENT: Primary | ICD-10-CM

## 2025-02-12 DIAGNOSIS — E66.01 OBESITY, MORBID (HCC): ICD-10-CM

## 2025-02-12 DIAGNOSIS — Z12.11 COLON CANCER SCREENING: ICD-10-CM

## 2025-02-12 DIAGNOSIS — K76.0 HEPATIC STEATOSIS: ICD-10-CM

## 2025-02-12 PROCEDURE — 99204 OFFICE O/P NEW MOD 45 MIN: CPT | Performed by: INTERNAL MEDICINE

## 2025-02-12 RX ORDER — TOBRAMYCIN AND DEXAMETHASONE 3; 1 MG/ML; MG/ML
SUSPENSION/ DROPS OPHTHALMIC
COMMUNITY
Start: 2025-02-10

## 2025-02-12 RX ORDER — SODIUM CHLORIDE, SODIUM LACTATE, POTASSIUM CHLORIDE, CALCIUM CHLORIDE 600; 310; 30; 20 MG/100ML; MG/100ML; MG/100ML; MG/100ML
125 INJECTION, SOLUTION INTRAVENOUS CONTINUOUS
OUTPATIENT
Start: 2025-02-12

## 2025-02-12 NOTE — PROGRESS NOTES
Name: Spencer Vicente      : 1960      MRN: [id number]  Encounter Provider: Ariadne Cortez MD  Encounter Date: 2025   Encounter department: [facility]  :  Assessment & Plan  Colon cancer screening  -Reports having 2 or 3 colonoscopies, last being at least 10 years ago.  Also reports family history of colon cancer in maternal grandfather.  -Patient is overdue for colonoscopy at this time.  Will schedule increased rescreening colonoscopy as he also reports history of colon polyps.  -Patient was explained about the risks and benefits of the procedure. Risks including but not limited to bleeding, infection, perforation were explained in detail. Also explained about less than 100% sensitivity with the exam and other alternatives.  -Recommend holding Jardiance before the procedure.  -Recommend MiraLAX/Dulcolax bowel prep.  Orders:    Ambulatory Referral to Gastroenterology    Colonoscopy; Future    Obesity, morbid (HCC)  -Recommend healthy weight loss, BMI approaching 39.  -Liver enzymes were previously normal with AST and ALT of 17 and 30 hemoglobin was normal.  Alkaline phosphatase normal.  -CT of abdomen and pelvis 2 years ago was notable for hepatic steatosis without any evidence of cirrhosis or biliary ductal dilatation.  -Recommend annually monitoring liver enzymes.       Gastroesophageal reflux disease, unspecified whether esophagitis present  -Reports occasional symptoms of acid reflux, no dysphagia, loss of appetite or early satiety.  Also reports family history of gastric cancer in mother.  -Would recommend EGD at this time for further evaluation.  -Recommend smoking cessation, patient reports using cigars regularly.  -Patient was explained about the lifestyle and dietary modifications.  Advised to avoid fatty foods, chocolates, caffeine, alcohol, and any other triggering foods.  Avoid eating for at least 3 hours before going to bed.  -If there is any evidence  of Salcedo's esophagus, would recommend starting on a PPI otherwise recommend starting on OTC Pepcid.    Orders:    EGD; Future    Hepatic steatosis  - BMI approaching 39.  - Fib 4 score of 1.04, excluding advanced fibrosis however and age greater than 65, would benefit from ultrasound elastography.  -Check liver enzymes annually, monitor platelets, check vitamin D levels annually.  -Check hepatitis B surface antibody, check hepatitis A total antibody.  If not immune, would benefit from vaccination.  Orders:    US elastography; Future    Comprehensive metabolic panel; Future        History of Present Illness   HPI  Spencer Vicente is a 64 y.o. male with history of CAD, status post stent placement last stent placement in 2017, on aspirin 81 mg, follows with cardiology at an outside facility, diabetes, on Jardiance, cauda equina syndrome with difficulty ambulating, presents for colon cancer screening evaluation.  Patient reports that he has family history of colon cancer in maternal grandfather.  Also reports family history of gastric cancer in mother.  Patient reports occasional symptoms of acid reflux, reports that sometimes it wakes him up.  He denies any dysphagia, loss of appetite or early satiety.  No change in bowel habits.  No melena, no hematochezia.  He reports having had several colonoscopies in the past, last 1 being at least over 10 years ago.  Patient reports that he did have polyps in the past as well.  He reports that he does smoke cigars and drinks socially.  No significant NSAID use.  No previous EGD.    Most recent blood work was notable for hemoglobin of 17.3, platelets 197, BUN 26, GFR of 86.      Review of Systems   Constitutional: Negative.    HENT: Negative.     Eyes: Negative.    Respiratory: Negative.     Cardiovascular: Negative.    Gastrointestinal:         See HPI.   Endocrine: Negative.    Genitourinary: Negative.    Musculoskeletal: Negative.    Skin: Negative.    Allergic/Immunologic:  "Negative.    Neurological: Negative.    Hematological: Negative.    Psychiatric/Behavioral: Negative.     All other systems reviewed and are negative.         Objective   /77 (BP Location: Left arm, Patient Position: Sitting, Cuff Size: Standard)   Pulse 61   Ht 5' 7\" (1.702 m)   Wt 111 kg (244 lb)   BMI 38.22 kg/m²      Physical Exam  Vitals and nursing note reviewed.   Constitutional:       General: He is not in acute distress.     Appearance: He is well-developed.   HENT:      Head: Normocephalic and atraumatic.   Eyes:      Conjunctiva/sclera: Conjunctivae normal.   Cardiovascular:      Rate and Rhythm: Normal rate and regular rhythm.      Heart sounds: No murmur heard.  Pulmonary:      Effort: Pulmonary effort is normal. No respiratory distress.      Breath sounds: Normal breath sounds.   Abdominal:      Palpations: Abdomen is soft.      Tenderness: There is no abdominal tenderness.   Musculoskeletal:         General: No swelling.      Cervical back: Neck supple.   Skin:     General: Skin is warm and dry.      Capillary Refill: Capillary refill takes less than 2 seconds.   Neurological:      Mental Status: He is alert.   Psychiatric:         Mood and Affect: Mood normal.           "

## 2025-02-12 NOTE — ASSESSMENT & PLAN NOTE
-Reports having 2 or 3 colonoscopies, last being at least 10 years ago.  Also reports family history of colon cancer in maternal grandfather.  -Patient is overdue for colonoscopy at this time.  Will schedule increased rescreening colonoscopy as he also reports history of colon polyps.  -Patient was explained about the risks and benefits of the procedure. Risks including but not limited to bleeding, infection, perforation were explained in detail. Also explained about less than 100% sensitivity with the exam and other alternatives.  -Recommend holding Jardiance before the procedure.  -Recommend MiraLAX/Dulcolax bowel prep.  Orders:    Ambulatory Referral to Gastroenterology    Colonoscopy; Future

## 2025-02-12 NOTE — PATIENT INSTRUCTIONS
Scheduled date of EGD/colonoscopy (as of today):03/27/25  Physician performing EGD/colonoscopy:Dr. Cortez  Location of EGD/colonoscopy[facility]  Desired bowel prep reviewed with patient:Gretel Tyler  Instructions reviewed with patient by:ara  Clearances:   n/a

## 2025-02-12 NOTE — ASSESSMENT & PLAN NOTE
-Recommend healthy weight loss, BMI approaching 39.  -Liver enzymes were previously normal with AST and ALT of 17 and 30 hemoglobin was normal.  Alkaline phosphatase normal.  -CT of abdomen and pelvis 2 years ago was notable for hepatic steatosis without any evidence of cirrhosis or biliary ductal dilatation.  -Recommend annually monitoring liver enzymes.

## 2025-02-12 NOTE — ASSESSMENT & PLAN NOTE
- BMI approaching 39.  - Fib 4 score of 1.04, excluding advanced fibrosis however and age greater than 65, would benefit from ultrasound elastography.  -Check liver enzymes annually, monitor platelets, check vitamin D levels annually.  -Check hepatitis B surface antibody, check hepatitis A total antibody.  If not immune, would benefit from vaccination.  Orders:    US elastography; Future    Comprehensive metabolic panel; Future

## 2025-02-12 NOTE — ASSESSMENT & PLAN NOTE
-Reports occasional symptoms of acid reflux, no dysphagia, loss of appetite or early satiety.  Also reports family history of gastric cancer in mother.  -Would recommend EGD at this time for further evaluation.  -Recommend smoking cessation, patient reports using cigars regularly.  -Patient was explained about the lifestyle and dietary modifications.  Advised to avoid fatty foods, chocolates, caffeine, alcohol, and any other triggering foods.  Avoid eating for at least 3 hours before going to bed.  -If there is any evidence of Salcedo's esophagus, would recommend starting on a PPI otherwise recommend starting on OTC Pepcid.    Orders:    EGD; Future

## 2025-02-25 ENCOUNTER — HOSPITAL ENCOUNTER (OUTPATIENT)
Dept: ULTRASOUND IMAGING | Facility: HOSPITAL | Age: 65
Discharge: HOME | End: 2025-02-25
Attending: INTERNAL MEDICINE
Payer: COMMERCIAL

## 2025-02-25 ENCOUNTER — APPOINTMENT (OUTPATIENT)
Dept: LAB | Facility: CLINIC | Age: 65
End: 2025-02-25
Payer: COMMERCIAL

## 2025-02-25 DIAGNOSIS — K76.0 HEPATIC STEATOSIS: ICD-10-CM

## 2025-02-25 LAB
ALBUMIN SERPL BCG-MCNC: 4.1 G/DL (ref 3.5–5)
ALP SERPL-CCNC: 69 U/L (ref 34–104)
ALT SERPL W P-5'-P-CCNC: 36 U/L (ref 7–52)
ANION GAP SERPL CALCULATED.3IONS-SCNC: 11 MMOL/L (ref 4–13)
AST SERPL W P-5'-P-CCNC: 25 U/L (ref 13–39)
BILIRUB SERPL-MCNC: 0.73 MG/DL (ref 0.2–1)
BUN SERPL-MCNC: 19 MG/DL (ref 5–25)
CALCIUM SERPL-MCNC: 9.1 MG/DL (ref 8.4–10.2)
CHLORIDE SERPL-SCNC: 103 MMOL/L (ref 96–108)
CO2 SERPL-SCNC: 25 MMOL/L (ref 21–32)
CREAT SERPL-MCNC: 0.87 MG/DL (ref 0.6–1.3)
GFR SERPL CREATININE-BSD FRML MDRD: 89 ML/MIN/1.73SQ M
GLUCOSE P FAST SERPL-MCNC: 161 MG/DL (ref 65–99)
POTASSIUM SERPL-SCNC: 4.2 MMOL/L (ref 3.5–5.3)
PROT SERPL-MCNC: 6.9 G/DL (ref 6.4–8.4)
SODIUM SERPL-SCNC: 139 MMOL/L (ref 135–147)

## 2025-02-25 PROCEDURE — 36415 COLL VENOUS BLD VENIPUNCTURE: CPT

## 2025-02-25 PROCEDURE — 80053 COMPREHEN METABOLIC PANEL: CPT

## 2025-02-25 PROCEDURE — 76981 USE PARENCHYMA: CPT

## 2025-02-26 ENCOUNTER — RESULTS FOLLOW-UP (OUTPATIENT)
Age: 65
End: 2025-02-26

## 2025-03-05 ENCOUNTER — OFFICE VISIT (OUTPATIENT)
Age: 65
End: 2025-03-05

## 2025-03-05 VITALS
BODY MASS INDEX: 37.76 KG/M2 | TEMPERATURE: 98.4 F | HEIGHT: 67 IN | OXYGEN SATURATION: 96 % | DIASTOLIC BLOOD PRESSURE: 72 MMHG | HEART RATE: 64 BPM | WEIGHT: 240.6 LBS | SYSTOLIC BLOOD PRESSURE: 132 MMHG

## 2025-03-05 DIAGNOSIS — I10 HYPERTENSION, UNSPECIFIED TYPE: ICD-10-CM

## 2025-03-05 DIAGNOSIS — E11.9 TYPE 2 DIABETES MELLITUS WITHOUT COMPLICATION, WITHOUT LONG-TERM CURRENT USE OF INSULIN (HCC): Primary | ICD-10-CM

## 2025-03-05 PROCEDURE — G2211 COMPLEX E/M VISIT ADD ON: HCPCS | Performed by: FAMILY MEDICINE

## 2025-03-05 PROCEDURE — 99214 OFFICE O/P EST MOD 30 MIN: CPT | Performed by: FAMILY MEDICINE

## 2025-03-05 RX ORDER — METFORMIN HYDROCHLORIDE 500 MG/1
500 TABLET, EXTENDED RELEASE ORAL 2 TIMES DAILY WITH MEALS
Qty: 14 TABLET | Refills: 0 | Status: SHIPPED | OUTPATIENT
Start: 2025-03-05 | End: 2025-03-12

## 2025-03-05 RX ORDER — METFORMIN HYDROCHLORIDE 500 MG/1
1000 TABLET, EXTENDED RELEASE ORAL 2 TIMES DAILY WITH MEALS
Qty: 400 TABLET | Refills: 0 | Status: SHIPPED | OUTPATIENT
Start: 2025-03-12

## 2025-03-05 NOTE — ASSESSMENT & PLAN NOTE
Lab Results   Component Value Date    HGBA1C 7.3 (A) 01/22/2025   Only medications is jardiance ($600) its too expensive  Has lost 10 pounds   Will switch back to metformin  Will be participating in group visits  Return for AWV on 4/7/2025  Repeat hemoglobin A1c July 22, 2025    Orders:    metFORMIN (GLUCOPHAGE-XR) 500 mg 24 hr tablet; Take 1 tablet (500 mg total) by mouth 2 (two) times a day with meals for 7 days    metFORMIN (GLUCOPHAGE-XR) 500 mg 24 hr tablet; Take 2 tablets (1,000 mg total) by mouth 2 (two) times a day with meals Do not start before March 12, 2025.

## 2025-03-05 NOTE — ASSESSMENT & PLAN NOTE
Compliant with mediciation  Will participate in group visits  Smoking cigars  Return for AWV on 4/7/2025  Continue with diet lifestyle modifications

## 2025-03-05 NOTE — PROGRESS NOTES
Name: Spencer Vicente      : 1960      MRN: [id number]  Encounter Provider: Glenna Keene MD  Encounter Date: 3/5/2025   Encounter department: [facility]  :  Assessment & Plan  Type 2 diabetes mellitus without complication, without long-term current use of insulin (HCC)    Lab Results   Component Value Date    HGBA1C 7.3 (A) 2025   Only medications is jardiance ($600) its too expensive  Has lost 10 pounds   Will switch back to metformin  Will be participating in group visits  Return for AWV on 2025  Repeat hemoglobin A1c 2025    Orders:    metFORMIN (GLUCOPHAGE-XR) 500 mg 24 hr tablet; Take 1 tablet (500 mg total) by mouth 2 (two) times a day with meals for 7 days    metFORMIN (GLUCOPHAGE-XR) 500 mg 24 hr tablet; Take 2 tablets (1,000 mg total) by mouth 2 (two) times a day with meals Do not start before 2025.    Hypertension, unspecified type  Compliant with mediciation  Will participate in group visits  Smoking cigars  Return for AWV on 2025  Continue with diet lifestyle modifications              History of Present Illness   Spencer is a pleasant 64-year-old male who presents to [facility] practice to follow-up on his blood sugars.  He notes he recently had a CMP and it showed that his glucose was in the 160s.  He notes that the GI provider mentioned that he should have this evaluated by his primary care physician.  Currently, his A1c is 7.3 and he is taking Jardiance.  He brings up the issue that his insurance switched in January and Jardiance is now $600 a month.  This is not something that is feasible.  Additionally he has other chronic conditions including hypertension, CAD, back and knee pain.  He does follow with a cardiologist in [place] and follows with Dr. Bell for his pain in his back and knee.  He is interested in group visits and will be signing up for that.          Review of Systems    Constitutional:  Negative for chills and fever.   HENT:  Negative for ear pain and sore throat.    Eyes:  Negative for pain and visual disturbance.   Respiratory:  Negative for cough and shortness of breath.    Cardiovascular:  Negative for chest pain and palpitations.   Gastrointestinal:  Negative for abdominal pain and vomiting.   Genitourinary:  Negative for dysuria and hematuria.   Musculoskeletal:  Negative for arthralgias and back pain.   Skin:  Negative for color change and rash.   Neurological:  Negative for seizures and syncope.   All other systems reviewed and are negative.      Objective   There were no vitals taken for this visit.     Physical Exam  Vitals and nursing note reviewed.   Constitutional:       General: He is not in acute distress.     Appearance: He is well-developed.   HENT:      Head: Normocephalic and atraumatic.      Right Ear: External ear normal.      Left Ear: External ear normal.      Nose: Nose normal.      Mouth/Throat:      Mouth: Mucous membranes are moist.   Eyes:      Extraocular Movements: Extraocular movements intact.      Conjunctiva/sclera: Conjunctivae normal.   Cardiovascular:      Rate and Rhythm: Normal rate and regular rhythm.      Pulses: Normal pulses.      Heart sounds: Normal heart sounds. No murmur heard.  Pulmonary:      Effort: Pulmonary effort is normal. No respiratory distress.      Breath sounds: Normal breath sounds.   Abdominal:      General: Abdomen is flat.      Palpations: Abdomen is soft.      Tenderness: There is no abdominal tenderness.   Musculoskeletal:         General: No swelling.      Cervical back: Neck supple.   Skin:     General: Skin is warm and dry.      Capillary Refill: Capillary refill takes less than 2 seconds.   Neurological:      Mental Status: He is alert and oriented to person, place, and time.   Psychiatric:         Mood and Affect: Mood normal.         Behavior: Behavior normal.

## 2025-03-11 VITALS — BODY MASS INDEX: 37.67 KG/M2 | HEIGHT: 67 IN | WEIGHT: 240 LBS

## 2025-03-11 NOTE — PRE-PROCEDURE INSTRUCTIONS
Pre-Surgery Instructions:   Medication Instructions    aspirin 81 mg chewable tablet Take night before surgery    atorvastatin (LIPITOR) 40 mg tablet Take night before surgery    Empagliflozin (Jardiance) 25 MG TABS Stop taking 4 days prior to surgery.    isosorbide mononitrate (IMDUR) 30 mg 24 hr tablet Take day of surgery.    losartan (COZAAR) 25 mg tablet Take night before surgery    metFORMIN (GLUCOPHAGE-XR) 500 mg 24 hr tablet Hold day of surgery.    psyllium (METAMUCIL) 58.6 % packet Hold day of surgery.    sertraline (ZOLOFT) 50 mg tablet Take day of surgery.    sotalol (BETAPACE) 80 mg tablet Take day of surgery.   Medication instructions for day of procedure reviewed. Please use only a sip of water to take your instructed morning medications (if any).      You will receive a call one business day prior to procedure with an arrival time and hospital directions. If procedure is scheduled on a Monday, the hospital will be calling you on the Friday prior to your procedure. If you have not heard from anyone by 8pm, please call the hospital supervisor through the hospital  at [phone]. ([place] [phone]).     Please do not eat or drink after 11 pm the night before the MRI. Please take your medications with a sip of water at least 2 hours prior to their arrival time.     Please shower either the night prior to the procedure or the morning of the procedure. Dress in clean, comfortable clothes. All patients will be required to change into a hospital gown. Street clothes are not permitted in the MRI. Magnetic nail polish must be removed prior to the arrival.      Keep any valuables, jewelry, piercings at home.     Arrive 15 minutes prior to your given arrival time in order to register. Please bring any prior CT or MRI studies of the same area that were not performed at a [facility] along.      Arrange for a responsible person to drive patient to and from the hospital on the day of the  procedure. Visitor Guidelines discussed.     Call the prescribing physician's office with any new illnesses, exposures, or additional questions prior to procedure.     Pt. Verbalized an understanding of all instructions reviewed and offers no concerns at this time.

## 2025-03-12 ENCOUNTER — HOSPITAL ENCOUNTER (OUTPATIENT)
Dept: RADIOLOGY | Facility: HOSPITAL | Age: 65
Discharge: HOME | End: 2025-03-12
Attending: PHYSICAL MEDICINE & REHABILITATION

## 2025-03-13 ENCOUNTER — ANESTHESIA EVENT (OUTPATIENT)
Dept: ANESTHESIOLOGY | Facility: HOSPITAL | Age: 65
End: 2025-03-13

## 2025-03-13 ENCOUNTER — ANESTHESIA (OUTPATIENT)
Dept: ANESTHESIOLOGY | Facility: HOSPITAL | Age: 65
End: 2025-03-13

## 2025-03-14 PROBLEM — Z12.11 COLON CANCER SCREENING: Status: RESOLVED | Noted: 2025-02-12 | Resolved: 2025-03-14

## 2025-03-22 ENCOUNTER — NURSE TRIAGE (OUTPATIENT)
Dept: OTHER | Facility: OTHER | Age: 65
End: 2025-03-22

## 2025-03-22 NOTE — TELEPHONE ENCOUNTER
FOLLOW UP: Patient taking Cefadroxil 5mg and Probiotics after cardiac procedure; would like to know if it's ok to continue with Colonscopy/Endoscopy scheduled for Thursday. Please follow up.    REASON FOR CONVERSATION: Medication Problem    SYMPTOMS: None    OTHER: Patient also wanted to advise that sometimes when he comes out of anesthesia, he has aphasia    DISPOSITION: No disposition on file.

## 2025-03-22 NOTE — TELEPHONE ENCOUNTER
"Regarding: colonscopy done/antibiotic/probiotic  ----- Message from Siobhan SCHMIDT sent at 3/22/2025 11:01 AM EDT -----  \"I am having a colonoscopy and endoscopy on Thursday and in another doctor care I am taking an antibiotic and a probiotic. I wanted to know if that would affect me getting my colonoscopy done?\"    "

## 2025-03-22 NOTE — TELEPHONE ENCOUNTER
"Reason for Disposition  • [1] Caller has NON-URGENT medicine question about med that PCP prescribed AND [2] triager unable to answer question    Answer Assessment - Initial Assessment Questions  1. NAME of MEDICINE: \"What medicine(s) are you calling about?\"        Cefadroxil - 5mg - take one capsule BID  Probiotics    2. QUESTION: \"What is your question?\" (e.g., double dose of medicine, side effect)        Patient has colonoscopy and endoscopy next week; had cardiology replace loop recorder this week and patient is taking antibiotics and probiotics     3. PRESCRIBER: \"Who prescribed the medicine?\" Reason: if prescribed by specialist, call should be referred to that group.        Cardiology    Protocols used: Medication Question Call-Adult-    "

## 2025-03-24 NOTE — TELEPHONE ENCOUNTER
I spoke with patient, he just had loop recorder replaced and on antibiotic. I reviewed he can continue medication and can take dose up to two hours prior to procedure with sips of water.    He states he has sleep apnea, not on CPAP (could not tolerate). He has had aphasia 2017 coming off anesthesia. Patient is scheduled at [facility] 3/27/25.  He did not mention at office visit or with preadmission nurse call.    He states his cardiologist also recommended he hold the ASA 81 mg dose prior to procedure in case of bleeding, please advise if he should follow their recommendations.

## 2025-03-24 NOTE — TELEPHONE ENCOUNTER
Pt called in stating that he is on an antibiotic from a recent cardiac procedure and a probiotic. He started taking the antibiotic last Thursday and on it for 10 days. He would like to know if this is going to be an issue with his upcoming procedure. He also wanted the doctor to know that he does suffer from sleep apnea and has issues coming off of anesthesia such as aphasia.

## 2025-03-25 ENCOUNTER — ANESTHESIA EVENT (OUTPATIENT)
Dept: ANESTHESIOLOGY | Facility: HOSPITAL | Age: 65
End: 2025-03-25

## 2025-03-25 ENCOUNTER — ANESTHESIA (OUTPATIENT)
Dept: ANESTHESIOLOGY | Facility: HOSPITAL | Age: 65
End: 2025-03-25

## 2025-03-26 NOTE — TELEPHONE ENCOUNTER
Pt calling to make sure his procedure is still on for tomorrow. Pt stated someone told him it may need to be moved? I did not see a message that his procedures need to be moved? Pt asked if we can reach back out to him with an answer by noon as his ride is coming from a distance and if he is being rescheduled he would like to let his ride know. Pt would like a call back either way as soon as possible

## 2025-03-26 NOTE — TELEPHONE ENCOUNTER
Spoke with patient advised I did not receive any notification to postpone procedure. Advised pt if anything was to come up, I would be sure to call. Otherwise pt will receive call this afternoon with procedure time

## 2025-03-27 ENCOUNTER — ANESTHESIA EVENT (OUTPATIENT)
Dept: GASTROENTEROLOGY | Facility: AMBULARY SURGERY CENTER | Age: 65
End: 2025-03-27
Payer: COMMERCIAL

## 2025-03-27 ENCOUNTER — HOSPITAL ENCOUNTER (OUTPATIENT)
Dept: GASTROENTEROLOGY | Facility: AMBULARY SURGERY CENTER | Age: 65
Setting detail: OUTPATIENT SURGERY
End: 2025-03-27
Attending: INTERNAL MEDICINE
Payer: COMMERCIAL

## 2025-03-27 VITALS
OXYGEN SATURATION: 97 % | DIASTOLIC BLOOD PRESSURE: 68 MMHG | HEART RATE: 47 BPM | TEMPERATURE: 97.3 F | RESPIRATION RATE: 18 BRPM | SYSTOLIC BLOOD PRESSURE: 136 MMHG

## 2025-03-27 DIAGNOSIS — Z12.11 COLON CANCER SCREENING: ICD-10-CM

## 2025-03-27 DIAGNOSIS — K21.9 GASTROESOPHAGEAL REFLUX DISEASE, UNSPECIFIED WHETHER ESOPHAGITIS PRESENT: ICD-10-CM

## 2025-03-27 LAB — GLUCOSE SERPL-MCNC: 138 MG/DL (ref 65–140)

## 2025-03-27 PROCEDURE — 45385 COLONOSCOPY W/LESION REMOVAL: CPT | Performed by: INTERNAL MEDICINE

## 2025-03-27 PROCEDURE — 88305 TISSUE EXAM BY PATHOLOGIST: CPT | Performed by: PATHOLOGY

## 2025-03-27 PROCEDURE — 43239 EGD BIOPSY SINGLE/MULTIPLE: CPT | Performed by: INTERNAL MEDICINE

## 2025-03-27 PROCEDURE — 82948 REAGENT STRIP/BLOOD GLUCOSE: CPT

## 2025-03-27 RX ORDER — GLYCOPYRROLATE 0.2 MG/ML
INJECTION INTRAMUSCULAR; INTRAVENOUS AS NEEDED
Status: DISCONTINUED | OUTPATIENT
Start: 2025-03-27 | End: 2025-03-27

## 2025-03-27 RX ORDER — SODIUM CHLORIDE, SODIUM LACTATE, POTASSIUM CHLORIDE, CALCIUM CHLORIDE 600; 310; 30; 20 MG/100ML; MG/100ML; MG/100ML; MG/100ML
125 INJECTION, SOLUTION INTRAVENOUS CONTINUOUS
Status: DISCONTINUED | OUTPATIENT
Start: 2025-03-27 | End: 2025-03-27 | Stop reason: SDUPTHER

## 2025-03-27 RX ORDER — PROPOFOL 10 MG/ML
INJECTION, EMULSION INTRAVENOUS CONTINUOUS PRN
Status: DISCONTINUED | OUTPATIENT
Start: 2025-03-27 | End: 2025-03-27

## 2025-03-27 RX ORDER — SODIUM CHLORIDE, SODIUM LACTATE, POTASSIUM CHLORIDE, CALCIUM CHLORIDE 600; 310; 30; 20 MG/100ML; MG/100ML; MG/100ML; MG/100ML
INJECTION, SOLUTION INTRAVENOUS CONTINUOUS PRN
Status: DISCONTINUED | OUTPATIENT
Start: 2025-03-27 | End: 2025-03-27

## 2025-03-27 RX ORDER — SODIUM CHLORIDE, SODIUM LACTATE, POTASSIUM CHLORIDE, CALCIUM CHLORIDE 600; 310; 30; 20 MG/100ML; MG/100ML; MG/100ML; MG/100ML
125 INJECTION, SOLUTION INTRAVENOUS CONTINUOUS
Status: DISPENSED | OUTPATIENT
Start: 2025-03-27

## 2025-03-27 RX ORDER — PROPOFOL 10 MG/ML
INJECTION, EMULSION INTRAVENOUS AS NEEDED
Status: DISCONTINUED | OUTPATIENT
Start: 2025-03-27 | End: 2025-03-27

## 2025-03-27 RX ORDER — LIDOCAINE HYDROCHLORIDE 10 MG/ML
INJECTION, SOLUTION EPIDURAL; INFILTRATION; INTRACAUDAL; PERINEURAL AS NEEDED
Status: DISCONTINUED | OUTPATIENT
Start: 2025-03-27 | End: 2025-03-27

## 2025-03-27 RX ADMIN — GLYCOPYRROLATE 0.1 MCG: 0.2 INJECTION, SOLUTION INTRAMUSCULAR; INTRAVENOUS at 09:46

## 2025-03-27 RX ADMIN — PROPOFOL 100 MG: 10 INJECTION, EMULSION INTRAVENOUS at 09:47

## 2025-03-27 RX ADMIN — PROPOFOL 50 MG: 10 INJECTION, EMULSION INTRAVENOUS at 09:52

## 2025-03-27 RX ADMIN — PROPOFOL 120 MCG/KG/MIN: 10 INJECTION, EMULSION INTRAVENOUS at 09:54

## 2025-03-27 RX ADMIN — LIDOCAINE HYDROCHLORIDE 50 MG: 10 INJECTION, SOLUTION EPIDURAL; INFILTRATION; INTRACAUDAL; PERINEURAL at 09:47

## 2025-03-27 RX ADMIN — PROPOFOL 100 MG: 10 INJECTION, EMULSION INTRAVENOUS at 09:49

## 2025-03-27 RX ADMIN — SODIUM CHLORIDE, SODIUM LACTATE, POTASSIUM CHLORIDE, AND CALCIUM CHLORIDE: .6; .31; .03; .02 INJECTION, SOLUTION INTRAVENOUS at 09:46

## 2025-03-27 NOTE — ANESTHESIA POSTPROCEDURE EVALUATION
Post-Op Assessment Note    CV Status:  Stable  Pain Score: 0    Pain management: adequate       Mental Status:  Sleepy and arousable   Hydration Status:  Stable   PONV Controlled:  Controlled   Airway Patency:  Patent     Post Op Vitals Reviewed: Yes    No anethesia notable event occurred.    Staff: CRNA           Last Filed PACU Vitals:  Vitals Value Taken Time   Temp     Pulse 54    /72    Resp 14    SpO2 97

## 2025-03-27 NOTE — ANESTHESIA PREPROCEDURE EVALUATION
Procedure:  COLONOSCOPY  EGD    Relevant Problems   CARDIO   (+) CAD (coronary artery disease)   (+) Hyperlipidemia   (+) Hypertension   (+) SOBOE (shortness of breath on exertion)      ENDO   (+) Type 2 diabetes mellitus (HCC)      GI/HEPATIC   (+) Gastroesophageal reflux disease   (+) Hepatic steatosis      /RENAL   (+) CKD (chronic kidney disease)      NEURO/PSYCH   (+) JUAN (generalized anxiety disorder)      PULMONARY   (+) Mild intermittent asthma without complication   (+) MOUNIKA (obstructive sleep apnea)   (+) SOBOE (shortness of breath on exertion)        Physical Exam    Airway    Mallampati score: II  TM Distance: >3 FB  Neck ROM: full     Dental   No notable dental hx     Cardiovascular  Cardiovascular exam normal    Pulmonary  Pulmonary exam normal     Other Findings        Anesthesia Plan  ASA Score- 3     Anesthesia Type- IV sedation with anesthesia with ASA Monitors.         Additional Monitors:     Airway Plan:     Comment: As per the patient, he goes into a brief phase of aphasia post anesthesia..       Plan Factors-Exercise tolerance (METS): >4 METS.    Chart reviewed.   Existing labs reviewed. Patient summary reviewed.    Patient is not a current smoker.      Obstructive sleep apnea risk education given perioperatively.        Induction-     Postoperative Plan-     Perioperative Resuscitation Plan - Level 1 - Full Code.       Informed Consent- Anesthetic plan and risks discussed with patient.  I personally reviewed this patient with the CRNA. Discussed and agreed on the Anesthesia Plan with the CRNA..      NPO Status:  Vitals Value Taken Time   Date of last liquid 03/27/25 03/27/25 0816   Time of last liquid 0300 03/27/25 0816   Date of last solid 03/25/25 03/27/25 0816   Time of last solid 2330 03/27/25 0816

## 2025-03-27 NOTE — H&P
History and Physical -  [facility]  Spencer Vicente 64 y.o. male MRN: [id number]    HPI: Spencer Vicente is a 64 y.o. year old male who presents for colon cancer screening evaluation and GERD.      Review of Systems    Historical Information   Past Medical History:   Diagnosis Date    Aphasia     2017- post stent placement    BPH (benign prostatic hyperplasia)     CAD (coronary artery disease)     Claustrophobia     Colon polyp     Depression     Diabetes mellitus (HCC)     Expressive aphasia     only after anesthesia lasts about one hour    Hyperlipidemia     Hypertension     Migraine     Hx none since 2014    Orthostatic hypotension     Shortness of breath     exertional SOB    Sleep apnea     [place] not wear mask ,ill fitting and noise made it impossible to wear     Past Surgical History:   Procedure Laterality Date    CARDIAC LOOP RECORDER      CATARACT EXTRACTION      COLONOSCOPY      CORONARY STENT PLACEMENT N/A mar and oct 2017    x2    MN XCAPSL CTRC RMVL INSJ IO LENS PROSTH W/O ECP Left 08/31/2020    Procedure: EXTRACTION EXTRACAPSULAR CATARACT PHACO INTRAOCULAR LENS (IOL);  Surgeon: Santiago Rolle MD;  Location: [facility] MAIN OR;  Service: Ophthalmology    MN XCAPSL CTRC RMVL INSJ IO LENS PROSTH W/O ECP Right 03/14/2022    Procedure: EXTRACTION EXTRACAPSULAR CATARACT PHACO INTRAOCULAR LENS (IOL);  Surgeon: Santiago Rolle MD;  Location: [facility] MAIN OR;  Service: Ophthalmology     Social History   Social History     Substance and Sexual Activity   Alcohol Use Not Currently    Alcohol/week: 2.0 standard drinks of alcohol    Types: 2 Cans of beer per week     Social History     Substance and Sexual Activity   Drug Use No     Social History     Tobacco Use   Smoking Status Some Days    Types: Cigars    Start date: 8/21/1992   Smokeless Tobacco Never   Tobacco Comments    2 a day  was 6 a day for 6 months only     Family History   Problem Relation Age of Onset    Colon cancer Maternal Grandmother      Breast cancer Mother     Other Mother     Heart Valve Disease Father     Leukemia Father     Migraines Sister     Heart failure Family        Meds/Allergies     Not in a hospital admission.    Allergies   Allergen Reactions    Zinc Acetate Nausea Only       Objective     /77   Pulse (!) 48   Temp (!) 97.3 °F (36.3 °C) (Tympanic)   Resp 16   SpO2 98%       PHYSICAL EXAM    Gen: NAD  CV: RRR  CHEST: Clear  ABD: soft, NT/ND  EXT: no edema  Neuro: AAO      ASSESSMENT/PLAN:  This is a 64 y.o. year old male here for evaluation of GERD and colon cancer screening.    PLAN:   Procedure: EGD and colonoscopy.

## 2025-04-01 PROCEDURE — 88305 TISSUE EXAM BY PATHOLOGIST: CPT | Performed by: PATHOLOGY

## 2025-04-02 ENCOUNTER — RESULTS FOLLOW-UP (OUTPATIENT)
Age: 65
End: 2025-04-02

## 2025-04-02 DIAGNOSIS — F41.1 GAD (GENERALIZED ANXIETY DISORDER): ICD-10-CM

## 2025-04-02 NOTE — TELEPHONE ENCOUNTER
Reason for call:   [x] Refill   [] Prior Auth  [] Other:     Office:   [] PCP/Provider -   [x] Specialty/Provider - Silke Tyler /Silke Tyler     Medication: sertraline (ZOLOFT) 50 mg tablet     Dose/Frequency: TAKE 1.5 TABLETS (75 MG TOTAL) BY MOUTH DAILY,     Quantity: 135    Pharmacy: [facility] [address] [facility]   Does the patient have enough for 3 days?   [] Yes   [x] No - Send as HP to POD

## 2025-04-04 ENCOUNTER — OFFICE VISIT (OUTPATIENT)
Age: 65
End: 2025-04-04

## 2025-04-04 VITALS
HEIGHT: 67 IN | WEIGHT: 244.4 LBS | OXYGEN SATURATION: 95 % | HEART RATE: 85 BPM | RESPIRATION RATE: 18 BRPM | SYSTOLIC BLOOD PRESSURE: 152 MMHG | DIASTOLIC BLOOD PRESSURE: 71 MMHG | TEMPERATURE: 98.3 F | BODY MASS INDEX: 38.36 KG/M2

## 2025-04-04 DIAGNOSIS — I10 PRIMARY HYPERTENSION: Primary | ICD-10-CM

## 2025-04-04 PROCEDURE — 99213 OFFICE O/P EST LOW 20 MIN: CPT | Performed by: FAMILY MEDICINE

## 2025-04-04 PROCEDURE — G2211 COMPLEX E/M VISIT ADD ON: HCPCS | Performed by: FAMILY MEDICINE

## 2025-04-04 NOTE — ASSESSMENT & PLAN NOTE
Continue current HTN meds     Waist circumference - 50 inches   Spencer Macedoadore  presents today for shared medical visit for HTN, session # 1  We discussed the following pillars of Lifestyle medicine:  Session 1: Fiber  Session 2: Behavioral change  Session 3: Positive habits  Session 4: Grocery shopping for health  The above noted lifestyle goals were discussed in detail with practicum to enhance learning.    SMART Goals set this visit:   What is your why?   Walk without getting out of breath, reduce/eliminate medications, buy/cook/eat healthier foods   How will you incorporate fiber in your diet?  Eat steel cut oatmeal for breakfast every morning   Drink coffee without sugar or creamer

## 2025-04-04 NOTE — PROGRESS NOTES
"  Assessment & Plan  Primary hypertension  Continue current HTN meds     Waist circumference - 50 inches   Spencer Vicente  presents today for shared medical visit for HTN, session # 1  We discussed the following pillars of Lifestyle medicine:  Session 1: Fiber  Session 2: Behavioral change  Session 3: Positive habits  Session 4: Grocery shopping for health  The above noted lifestyle goals were discussed in detail with practicum to enhance learning.    SMART Goals set this visit:   What is your why?   Walk without getting out of breath, reduce/eliminate medications, buy/cook/eat healthier foods   How will you incorporate fiber in your diet?  Eat steel cut oatmeal for breakfast every morning   Drink coffee without sugar or creamer          Nutrition Assessment and Intervention:     Reviewed food recall journal        Previous relevant clinical information reviewed from medical, surgical and psychosocial history, medication and allergies and labs/studies.    Patient Active Problem List   Diagnosis    History of colon polyps    Mild intermittent asthma without complication    MOUNIKA (obstructive sleep apnea)    SOBOE (shortness of breath on exertion)    CAD (coronary artery disease)    Hyperlipidemia    Type 2 diabetes mellitus (HCC)    Obesity, morbid (HCC)    Cellulitis of left lower extremity    CKD (chronic kidney disease)    Hypertension    Status post placement of implantable loop recorder    JUAN (generalized anxiety disorder)    Hoarding disorder    Gastroesophageal reflux disease    Hepatic steatosis       Review of systems: No new or worsening:   Unexplained fever/weight loss  Respiratory issues such as new shortness of breath, cough  Heart issues such as new chest pain or pressure, palpitations        /71 (BP Location: Left arm, Patient Position: Sitting, Cuff Size: Large)   Pulse 85   Temp 98.3 °F (36.8 °C)   Resp 18   Ht 5' 7.28\" (1.709 m)   Wt 111 kg (244 lb 6.4 oz)   SpO2 95%   BMI 37.96 kg/m² "   Waist circumference (measured standing at umbilicus, patient exhales)  If initial BP > 140/90, repeat value: 152/71    Alert, no acute distress, cooperative  Skin: no pallor  Respiration unlabored  Cardiac: Regular rate         Follow up in 1 week

## 2025-04-07 ENCOUNTER — OFFICE VISIT (OUTPATIENT)
Age: 65
End: 2025-04-07

## 2025-04-07 VITALS
RESPIRATION RATE: 16 BRPM | WEIGHT: 246 LBS | BODY MASS INDEX: 38.61 KG/M2 | HEART RATE: 42 BPM | SYSTOLIC BLOOD PRESSURE: 138 MMHG | TEMPERATURE: 97.8 F | OXYGEN SATURATION: 96 % | DIASTOLIC BLOOD PRESSURE: 80 MMHG | HEIGHT: 67 IN

## 2025-04-07 DIAGNOSIS — Z00.00 MEDICARE ANNUAL WELLNESS VISIT, SUBSEQUENT: ICD-10-CM

## 2025-04-07 DIAGNOSIS — K21.9 GASTROESOPHAGEAL REFLUX DISEASE, UNSPECIFIED WHETHER ESOPHAGITIS PRESENT: ICD-10-CM

## 2025-04-07 DIAGNOSIS — I10 HYPERTENSION, UNSPECIFIED TYPE: ICD-10-CM

## 2025-04-07 DIAGNOSIS — Z23 ENCOUNTER FOR IMMUNIZATION: ICD-10-CM

## 2025-04-07 DIAGNOSIS — Z00.00 PHYSICAL EXAM: ICD-10-CM

## 2025-04-07 DIAGNOSIS — Z00.00 ENCOUNTER FOR ANNUAL WELLNESS VISIT (AWV) IN MEDICARE PATIENT: Primary | ICD-10-CM

## 2025-04-07 PROCEDURE — G0009 ADMIN PNEUMOCOCCAL VACCINE: HCPCS | Performed by: FAMILY MEDICINE

## 2025-04-07 PROCEDURE — G0439 PPPS, SUBSEQ VISIT: HCPCS | Performed by: FAMILY MEDICINE

## 2025-04-07 PROCEDURE — 90750 HZV VACC RECOMBINANT IM: CPT | Performed by: FAMILY MEDICINE

## 2025-04-07 PROCEDURE — 90471 IMMUNIZATION ADMIN: CPT | Performed by: FAMILY MEDICINE

## 2025-04-07 PROCEDURE — 90677 PCV20 VACCINE IM: CPT | Performed by: FAMILY MEDICINE

## 2025-04-07 RX ORDER — FAMOTIDINE 20 MG/1
20 TABLET, FILM COATED ORAL 2 TIMES DAILY
Qty: 180 TABLET | Refills: 1 | Status: SHIPPED | OUTPATIENT
Start: 2025-04-07

## 2025-04-07 NOTE — PROGRESS NOTES
Name: Spencer Vicente      : 1960      MRN: [id number]  Encounter Provider: Glenna Keene MD  Encounter Date: 2025   Encounter department: [facility]  :  Assessment & Plan  Encounter for annual wellness visit (AWV) in Medicare patient  Presented for AWV  Notes heart burn symptoms       Encounter for immunization  Provided with 2 vaccines  Orders:    Pneumococcal Conjugate Vaccine 20-valent (Pcv20)    Zoster Vaccine Recombinant IM    Hypertension, unspecified type  /80 in office  Typically runs normal, no complaints  No medication changes made  Following with group visits       Gastroesophageal reflux disease, unspecified whether esophagitis present  Was recommended to start PEPCID by GI.   Medication sent to the pharmacy  Orders:    famotidine (PEPCID) 20 mg tablet; Take 1 tablet (20 mg total) by mouth 2 (two) times a day    Physical exam         BMI Counseling: Body mass index is 38.53 kg/m². The BMI is above normal. Nutrition recommendations include decreasing portion sizes and encouraging healthy choices of fruits and vegetables. Exercise recommendations include exercising 3-5 times per week and strength training exercises. Rationale for BMI follow-up plan is due to patient being overweight or obese.     Depression Screening and Follow-up Plan: Patient was screened for depression during today's encounter. They screened negative with a PHQ-2 score of 1.      Tobacco Cessation Counseling: Tobacco cessation counseling was provided. The patient is sincerely urged to quit consumption of tobacco. He is ready to quit tobacco. Medication options and side effects of medication discussed. Patient refused medication. Smoking cigars; trying to cut back       Preventive health issues were discussed with patient, and age appropriate screening tests were ordered as noted in patient's After Visit Summary. Personalized health advice and appropriate referrals for  health education or preventive services given if needed, as noted in patient's After Visit Summary.    History of Present Illness     Spencer is a 64-year-old male who presents to the office for AWV.  He has a past medical history of HTN, CAD, asthma, MOUNIKA, GERD, hepatic steatosis, T2DM, CKD, morbid obesity, H LD, and history of shortness of breath with exertion.  He notes that he is very pleased with group visits which he started last week.  He notes that he is very dedicated to lifestyle changes.       Patient Care Team:  Glenna Keene MD as PCP - General (Family Medicine)  Emerson Ken MD as PCP - PCP-[facility] ([facility])    Review of Systems   Constitutional:  Negative for chills and fever.   HENT:  Negative for ear pain and sore throat.    Eyes:  Negative for pain and visual disturbance.   Respiratory:  Negative for cough and shortness of breath.    Cardiovascular:  Negative for chest pain and palpitations.   Gastrointestinal:  Negative for abdominal pain and vomiting.   Genitourinary:  Negative for dysuria and hematuria.   Musculoskeletal:  Negative for arthralgias and back pain.   Skin:  Negative for color change and rash.   Neurological:  Negative for seizures and syncope.   All other systems reviewed and are negative.    Medical History Reviewed by provider this encounter:  Tobacco  Allergies  Meds  Problems  Med Hx  Surg Hx  Fam Hx       Annual Wellness Visit Questionnaire       Health Risk Assessment:   Patient rates overall health as good. Patient feels that their physical health rating is slightly better. Patient is satisfied with their life. Eyesight was rated as same. Hearing was rated as slightly worse. Patient feels that their emotional and mental health rating is slightly better. Patients states they are never, rarely angry. Patient states they are often unusually tired/fatigued. Pain experienced in the last 7 days has been a lot. Patient's pain rating has been 8/10. Patient states  that he has experienced no weight loss or gain in last 6 months.     Depression Screening:   PHQ-2 Score: 1      Fall Risk Screening:   In the past year, patient has experienced: no history of falling in past year      Home Safety:  Patient does not have trouble with stairs inside or outside of their home. Patient has working smoke alarms and has working carbon monoxide detector. Home safety hazards include: none.     Nutrition:   Current diet is Diabetic.     Medications:   Patient is not currently taking any over-the-counter supplements. Patient is able to manage medications.     Activities of Daily Living (ADLs)/Instrumental Activities of Daily Living (IADLs):   Walk and transfer into and out of bed and chair?: Yes  Dress and groom yourself?: Yes    Bathe or shower yourself?: Yes    Feed yourself? Yes  Do your laundry/housekeeping?: Yes  Manage your money, pay your bills and track your expenses?: Yes  Make your own meals?: Yes    Do your own shopping?: Yes    Previous Hospitalizations:   Any hospitalizations or ED visits within the last 12 months?: Yes    How many hospitalizations have you had in the last year?: 3-4    PREVENTIVE SCREENINGS      Cardiovascular Screening:    General: Screening Not Indicated and History Lipid Disorder      Diabetes Screening:     General: Screening Not Indicated and History Diabetes      Colorectal Cancer Screening:     General: Screening Current      Prostate Cancer Screening:    General: Screening Current      Abdominal Aortic Aneurysm (AAA) Screening:    Risk factors include: age between 65-76 yo and tobacco use        Lung Cancer Screening:     General: Screening Not Indicated      Hepatitis C Screening:    General: Screening Current    Screening, Brief Intervention, and Referral to Treatment (SBIRT)     Screening  Typical number of drinks in a day: 0  Typical number of drinks in a week: 0  Interpretation: Low risk drinking behavior.    Single Item Drug Screening:  How often  "have you used an illegal drug (including marijuana) or a prescription medication for non-medical reasons in the past year? never    Single Item Drug Screen Score: 0  Interpretation: Negative screen for possible drug use disorder    Other Counseling Topics:   Car/seat belt/driving safety, skin self-exam, sunscreen and calcium and vitamin D intake and regular weightbearing exercise.     Social Drivers of Health     Financial Resource Strain: Low Risk  (4/7/2025)    Overall Financial Resource Strain (CARDIA)     Difficulty of Paying Living Expenses: Not hard at all   Food Insecurity: No Food Insecurity (4/7/2025)    Hunger Vital Sign     Worried About Running Out of Food in the Last Year: Never true     Ran Out of Food in the Last Year: Never true   Transportation Needs: No Transportation Needs (4/7/2025)    PRAPARE - Transportation     Lack of Transportation (Medical): No     Lack of Transportation (Non-Medical): No   Housing Stability: Low Risk  (4/7/2025)    Housing Stability Vital Sign     Unable to Pay for Housing in the Last Year: No     Number of Times Moved in the Last Year: 0     Homeless in the Last Year: No   Utilities: Not At Risk (4/7/2025)    [facility] Utilities     Threatened with loss of utilities: No     No results found.    Objective   /80   Pulse (!) 42   Temp 97.8 °F (36.6 °C) (Tympanic)   Resp 16   Ht 5' 7\" (1.702 m)   Wt 112 kg (246 lb)   SpO2 96%   BMI 38.53 kg/m²     Physical Exam  Vitals and nursing note reviewed.   Constitutional:       General: He is not in acute distress.     Appearance: He is well-developed. He is morbidly obese.   HENT:      Head: Normocephalic and atraumatic.      Right Ear: External ear normal.      Left Ear: External ear normal.      Nose: Nose normal.      Mouth/Throat:      Mouth: Mucous membranes are moist.   Eyes:      Extraocular Movements: Extraocular movements intact.      Conjunctiva/sclera: Conjunctivae normal.   Cardiovascular:      Rate and Rhythm: " Normal rate and regular rhythm.      Pulses: Normal pulses.      Heart sounds: Normal heart sounds. No murmur heard.  Pulmonary:      Effort: Pulmonary effort is normal. No respiratory distress.      Breath sounds: Normal breath sounds.   Abdominal:      General: Abdomen is flat.      Palpations: Abdomen is soft.      Tenderness: There is no abdominal tenderness.   Musculoskeletal:         General: No swelling. Normal range of motion.      Cervical back: Normal range of motion and neck supple.   Skin:     General: Skin is warm and dry.      Capillary Refill: Capillary refill takes less than 2 seconds.   Neurological:      Mental Status: He is alert and oriented to person, place, and time.   Psychiatric:         Mood and Affect: Mood normal.         Behavior: Behavior normal.         Thought Content: Thought content normal.         Judgment: Judgment normal.

## 2025-04-07 NOTE — ASSESSMENT & PLAN NOTE
Was recommended to start PEPCID by GI.   Medication sent to the pharmacy  Orders:    famotidine (PEPCID) 20 mg tablet; Take 1 tablet (20 mg total) by mouth 2 (two) times a day

## 2025-04-07 NOTE — PATIENT INSTRUCTIONS
Medicare Preventive Visit Patient Instructions  Thank you for completing your Welcome to Medicare Visit or Medicare Annual Wellness Visit today. Your next wellness visit will be due in one year (4/8/2026).  The screening/preventive services that you may require over the next 5-10 years are detailed below. Some tests may not apply to you based off risk factors and/or age. Screening tests ordered at today's visit but not completed yet may show as past due. Also, please note that scanned in results may not display below.  Preventive Screenings:  Service Recommendations Previous Testing/Comments   Colorectal Cancer Screening  Colonoscopy    Fecal Occult Blood Test (FOBT)/Fecal Immunochemical Test (FIT)  Fecal DNA/Cologuard Test  Flexible Sigmoidoscopy Age: 45-75 years old   Colonoscopy: every 10 years (May be performed more frequently if at higher risk)  OR  FOBT/FIT: every 1 year  OR  Cologuard: every 3 years  OR  Sigmoidoscopy: every 5 years  Screening may be recommended earlier than age 45 if at higher risk for colorectal cancer. Also, an individualized decision between you and your healthcare provider will decide whether screening between the ages of 76-85 would be appropriate. Colonoscopy: 03/27/2025  FOBT/FIT: Not on file  Cologuard: Not on file  Sigmoidoscopy: Not on file    Screening Current     Prostate Cancer Screening Individualized decision between patient and health care provider in men between ages of 55-69   Medicare will cover every 12 months beginning on the day after your 50th birthday PSA: 3.513 ng/mL     Screening Current     Hepatitis C Screening Once for adults born between 1945 and 1965  More frequently in patients at high risk for Hepatitis C Hep C Antibody: 01/24/2025    Screening Current   Diabetes Screening 1-2 times per year if you're at risk for diabetes or have pre-diabetes Fasting glucose: 161 mg/dL (2/25/2025)  A1C: 7.3 (1/22/2025)  Screening Not Indicated  History Diabetes   Cholesterol  Screening Once every 5 years if you don't have a lipid disorder. May order more often based on risk factors. Lipid panel: 01/24/2025  Screening Not Indicated  History Lipid Disorder      Other Preventive Screenings Covered by Medicare:  Abdominal Aortic Aneurysm (AAA) Screening: covered once if your at risk. You're considered to be at risk if you have a family history of AAA or a male between the age of 65-75 who smoking at least 100 cigarettes in your lifetime.  Lung Cancer Screening: covers low dose CT scan once per year if you meet all of the following conditions: (1) Age 55-77; (2) No signs or symptoms of lung cancer; (3) Current smoker or have quit smoking within the last 15 years; (4) You have a tobacco smoking history of at least 20 pack years (packs per day x number of years you smoked); (5) You get a written order from a healthcare provider.  Glaucoma Screening: covered annually if you're considered high risk: (1) You have diabetes OR (2) Family history of glaucoma OR (3)  aged 50 and older OR (4)  American aged 65 and older  Osteoporosis Screening: covered every 2 years if you meet one of the following conditions: (1) Have a vertebral abnormality; (2) On glucocorticoid therapy for more than 3 months; (3) Have primary hyperparathyroidism; (4) On osteoporosis medications and need to assess response to drug therapy.  HIV Screening: covered annually if you're between the age of 15-65. Also covered annually if you are younger than 15 and older than 65 with risk factors for HIV infection. For pregnant patients, it is covered up to 3 times per pregnancy.    Immunizations:  Immunization Recommendations   Influenza Vaccine Annual influenza vaccination during flu season is recommended for all persons aged >= 6 months who do not have contraindications   Pneumococcal Vaccine   * Pneumococcal conjugate vaccine = PCV13 (Prevnar 13), PCV15 (Vaxneuvance), PCV20 (Prevnar 20)  * Pneumococcal  polysaccharide vaccine = PPSV23 (Pneumovax) Adults 19-63 yo with certain risk factors or if 65+ yo  If never received any pneumonia vaccine: recommend Prevnar 20 (PCV20)  Give PCV20 if previously received 1 dose of PCV13 or PPSV23   Hepatitis B Vaccine 3 dose series if at intermediate or high risk (ex: diabetes, end stage renal disease, liver disease)   Respiratory syncytial virus (RSV) Vaccine - COVERED BY MEDICARE PART D  * RSVPreF3 (Arexvy) CDC recommends that adults 60 years of age and older may receive a single dose of RSV vaccine using shared clinical decision-making (SCDM)   Tetanus (Td) Vaccine - COST NOT COVERED BY MEDICARE PART B Following completion of primary series, a booster dose should be given every 10 years to maintain immunity against tetanus. Td may also be given as tetanus wound prophylaxis.   Tdap Vaccine - COST NOT COVERED BY MEDICARE PART B Recommended at least once for all adults. For pregnant patients, recommended with each pregnancy.   Shingles Vaccine (Shingrix) - COST NOT COVERED BY MEDICARE PART B  2 shot series recommended in those 19 years and older who have or will have weakened immune systems or those 50 years and older     Health Maintenance Due:      Topic Date Due   • Colorectal Cancer Screening  03/26/2028   • Hepatitis C Screening  Completed     Immunizations Due:      Topic Date Due   • COVID-19 Vaccine (8 - 2024-25 season) 04/08/2025     Advance Directives   What are advance directives?  Advance directives are legal documents that state your wishes and plans for medical care. These plans are made ahead of time in case you lose your ability to make decisions for yourself. Advance directives can apply to any medical decision, such as the treatments you want, and if you want to donate organs.   What are the types of advance directives?  There are many types of advance directives, and each state has rules about how to use them. You may choose a combination of any of the  following:  Living will:  This is a written record of the treatment you want. You can also choose which treatments you do not want, which to limit, and which to stop at a certain time. This includes surgery, medicine, IV fluid, and tube feedings.   Durable power of  for healthcare (DPAHC):  This is a written record that states who you want to make healthcare choices for you when you are unable to make them for yourself. This person, called a proxy, is usually a family member or a friend. You may choose more than 1 proxy.  Do not resuscitate (DNR) order:  A DNR order is used in case your heart stops beating or you stop breathing. It is a request not to have certain forms of treatment, such as CPR. A DNR order may be included in other types of advance directives.  Medical directive:  This covers the care that you want if you are in a coma, near death, or unable to make decisions for yourself. You can list the treatments you want for each condition. Treatment may include pain medicine, surgery, blood transfusions, dialysis, IV or tube feedings, and a ventilator (breathing machine).  Values history:  This document has questions about your views, beliefs, and how you feel and think about life. This information can help others choose the care that you would choose.  Why are advance directives important?  An advance directive helps you control your care. Although spoken wishes may be used, it is better to have your wishes written down. Spoken wishes can be misunderstood, or not followed. Treatments may be given even if you do not want them. An advance directive may make it easier for your family to make difficult choices about your care.   Cigarette Smoking and Your Health   Risks to your health if you smoke:  Nicotine and other chemicals found in tobacco damage every cell in your body. Even if you are a light smoker, you have an increased risk for cancer, heart disease, and lung disease. If you are pregnant or  have diabetes, smoking increases your risk for complications.   Benefits to your health if you stop smoking:   You decrease respiratory symptoms such as coughing, wheezing, and shortness of breath.   You reduce your risk for cancers of the lung, mouth, throat, kidney, bladder, pancreas, stomach, and cervix. If you already have cancer, you increase the benefits of chemotherapy. You also reduce your risk for cancer returning or a second cancer from developing.   You reduce your risk for heart disease, blood clots, heart attack, and stroke.   You reduce your risk for lung infections, and diseases such as pneumonia, asthma, chronic bronchitis, and emphysema.  Your circulation improves. More oxygen can be delivered to your body. If you have diabetes, you lower your risk for complications, such as kidney, artery, and eye diseases. You also lower your risk for nerve damage. Nerve damage can lead to amputations, poor vision, and blindness.  You improve your body's ability to heal and to fight infections.  For more information and support to stop smoking:   [place]  Phone: 4- 416 - [phone]  Web Address: www.[employer]  Weight Management   Why it is important to manage your weight:  Being overweight increases your risk of health conditions such as heart disease, high blood pressure, type 2 diabetes, and certain types of cancer. It can also increase your risk for osteoarthritis, sleep apnea, and other respiratory problems. Aim for a slow, steady weight loss. Even a small amount of weight loss can lower your risk of health problems.  How to lose weight safely:  A safe and healthy way to lose weight is to eat fewer calories and get regular exercise. You can lose up about 1 pound a week by decreasing the number of calories you eat by 500 calories each day.   Healthy meal plan for weight management:  A healthy meal plan includes a variety of foods, contains fewer calories, and helps you stay healthy. A healthy meal plan  includes the following:  Eat whole-grain foods more often.  A healthy meal plan should contain fiber. Fiber is the part of grains, fruits, and vegetables that is not broken down by your body. Whole-grain foods are healthy and provide extra fiber in your diet. Some examples of whole-grain foods are whole-wheat breads and pastas, oatmeal, brown rice, and bulgur.  Eat a variety of vegetables every day.  Include dark, leafy greens such as spinach, kale, emre greens, and mustard greens. Eat yellow and orange vegetables such as carrots, sweet potatoes, and winter squash.   Eat a variety of fruits every day.  Choose fresh or canned fruit (canned in its own juice or light syrup) instead of juice. Fruit juice has very little or no fiber.  Eat low-fat dairy foods.  Drink fat-free (skim) milk or 1% milk. Eat fat-free yogurt and low-fat cottage cheese. Try low-fat cheeses such as mozzarella and other reduced-fat cheeses.  Choose meat and other protein foods that are low in fat.  Choose beans or other legumes such as split peas or lentils. Choose fish, skinless poultry (chicken or turkey), or lean cuts of red meat (beef or pork). Before you cook meat or poultry, cut off any visible fat.   Use less fat and oil.  Try baking foods instead of frying them. Add less fat, such as margarine, sour cream, regular salad dressing and mayonnaise to foods. Eat fewer high-fat foods. Some examples of high-fat foods include french fries, doughnuts, ice cream, and cakes.  Eat fewer sweets.  Limit foods and drinks that are high in sugar. This includes candy, cookies, regular soda, and sweetened drinks.  Exercise:  Exercise at least 30 minutes per day on most days of the week. Some examples of exercise include walking, biking, dancing, and swimming. You can also fit in more physical activity by taking the stairs instead of the elevator or parking farther away from stores. Ask your healthcare provider about the best exercise plan for you.      ©  Copyright [employer] 2018 Information is for End User's use only and may not be sold, redistributed or otherwise used for commercial purposes. All illustrations and images included in CareNotes® are the copyrighted property of [employer]M., Inc. or [employer]

## 2025-04-07 NOTE — ASSESSMENT & PLAN NOTE
/80 in office  Typically runs normal, no complaints  No medication changes made  Following with group visits

## 2025-04-11 ENCOUNTER — OFFICE VISIT (OUTPATIENT)
Age: 65
End: 2025-04-11

## 2025-04-11 VITALS
DIASTOLIC BLOOD PRESSURE: 85 MMHG | BODY MASS INDEX: 38.3 KG/M2 | SYSTOLIC BLOOD PRESSURE: 142 MMHG | HEIGHT: 67 IN | WEIGHT: 244 LBS

## 2025-04-11 DIAGNOSIS — I10 PRIMARY HYPERTENSION: Primary | ICD-10-CM

## 2025-04-11 PROCEDURE — 99213 OFFICE O/P EST LOW 20 MIN: CPT | Performed by: FAMILY MEDICINE

## 2025-04-11 PROCEDURE — G2211 COMPLEX E/M VISIT ADD ON: HCPCS | Performed by: FAMILY MEDICINE

## 2025-04-11 NOTE — PROGRESS NOTES
"Assessment & Plan  Primary hypertension                Continue current HTN meds Sotalol and Losartan    Spencer Graysonrylee  presents today for shared medical visit for HTN, session # 2  We discussed the following pillars of Lifestyle medicine:  Session 1: Fiber  Session 2: Behavioral change  Session 3: Positive habits  Session 4: Grocery shopping for health  The above noted lifestyle goals were discussed in detail with practicum to enhance learning.    SMART Goals set this visit: I would like to stop eating some of the foods I thought were healthier and eat some healthier options.    Previous relevant clinical information reviewed from medical, surgical and psychosocial history, medication and allergies and labs/studies.    Patient Active Problem List   Diagnosis    History of colon polyps    Mild intermittent asthma without complication    MOUNIKA (obstructive sleep apnea)    SOBOE (shortness of breath on exertion)    CAD (coronary artery disease)    Hyperlipidemia    Type 2 diabetes mellitus (HCC)    Obesity, morbid (HCC)    Cellulitis of left lower extremity    CKD (chronic kidney disease)    Hypertension    Status post placement of implantable loop recorder    JUAN (generalized anxiety disorder)    Hoarding disorder    Gastroesophageal reflux disease    Hepatic steatosis         Review of systems: No new or worsening:   Unexplained fever/weight loss  Respiratory issues such as new shortness of breath, cough  Heart issues such as new chest pain or pressure, palpitations      /85 (BP Location: Right arm)   Ht 5' 7\" (1.702 m)   Wt 111 kg (244 lb)   BMI 38.22 kg/m²   If initial BP > 140/90, repeat value: 142/85    Alert, no acute distress, cooperative  Skin: no pallor  Respiration unlabored  Cardiac: Regular rate         Follow up in 1 week  "

## 2025-04-18 ENCOUNTER — OFFICE VISIT (OUTPATIENT)
Age: 65
End: 2025-04-18

## 2025-04-18 DIAGNOSIS — E78.2 MIXED HYPERLIPIDEMIA: ICD-10-CM

## 2025-04-18 DIAGNOSIS — E11.9 TYPE 2 DIABETES MELLITUS WITHOUT COMPLICATION, WITHOUT LONG-TERM CURRENT USE OF INSULIN (HCC): ICD-10-CM

## 2025-04-18 DIAGNOSIS — E11.59 TYPE 2 DIABETES MELLITUS WITH OTHER CIRCULATORY COMPLICATION, WITHOUT LONG-TERM CURRENT USE OF INSULIN (HCC): ICD-10-CM

## 2025-04-18 DIAGNOSIS — I10 PRIMARY HYPERTENSION: Primary | ICD-10-CM

## 2025-04-18 PROBLEM — L03.116 CELLULITIS OF LEFT LOWER EXTREMITY: Status: RESOLVED | Noted: 2021-03-16 | Resolved: 2025-04-18

## 2025-04-18 PROBLEM — N18.9 CKD (CHRONIC KIDNEY DISEASE): Status: RESOLVED | Noted: 2021-03-16 | Resolved: 2025-04-18

## 2025-04-18 PROCEDURE — G2211 COMPLEX E/M VISIT ADD ON: HCPCS | Performed by: FAMILY MEDICINE

## 2025-04-18 PROCEDURE — 99213 OFFICE O/P EST LOW 20 MIN: CPT | Performed by: FAMILY MEDICINE

## 2025-04-18 NOTE — PROGRESS NOTES
Assessment & Plan  Primary hypertension  Continue same Losartan. Good bp control.   Continue group visits  Orders:  •  Ambulatory referral to chronic care management; Future    Type 2 diabetes mellitus without complication, without long-term current use of insulin (HCC)  Clinically stable, continue same medical management with Metformin    Lab Results   Component Value Date    HGBA1C 7.3 (A) 01/22/2025       Orders:  •  Ambulatory referral to chronic care management; Future    Mixed hyperlipidemia  Clinically stable, continue same medical management with Atorvastatin    Orders:  •  Ambulatory referral to chronic care management; Future    Type 2 diabetes mellitus with other circulatory complication, without long-term current use of insulin (HCC)           Continue current HTN meds     Spencer Vicente  presents today for shared medical visit for HTN, session # 3  We discussed the following pillars of Lifestyle medicine:  Session 1: Fiber  Session 2: Behavioral change  Session 3: Positive habits  Session 4: Grocery shopping for health  The above noted lifestyle goals were discussed in detail with practicum to enhance learning.    Progress/goals reached since last visit Continue wt loss via healthy lifestyle. He is down to 232.1. We discussed proper pacing of wt loss, with realistic goal of 5 - 10% loss over 6 months.   SMART Goals set this visit: continue to lose wt, rate of 5% per 6 mos. Implement increased fiber foods      Previous relevant clinical information reviewed from medical, surgical and psychosocial history, medication and allergies and labs/studies.    Patient Active Problem List   Diagnosis   • History of colon polyps   • Mild intermittent asthma without complication   • MOUNIKA (obstructive sleep apnea)   • SOBOE (shortness of breath on exertion)   • CAD (coronary artery disease)   • Mixed hyperlipidemia   • Type 2 diabetes mellitus with circulatory disorder, without long-term current use of insulin (HCC)   •  "Obesity, morbid (HCC)   • Primary hypertension   • Status post placement of implantable loop recorder   • JUAN (generalized anxiety disorder)   • Hoarding disorder   • Gastroesophageal reflux disease   • Hepatic steatosis         Review of systems: No new or worsening:   Unexplained fever/weight loss  Respiratory issues such as new shortness of breath, cough  Heart issues such as new chest pain or pressure, palpitations        106/54 L arm large cuff, sitting, P 43, RR 15, 96.5 tympanic L ear, pulse ox 95%, Ht 5'7\", Wt 232.1, shoes off        Alert, no acute distress, cooperative  Skin: no pallor  Respiration unlabored, LUNGS CTA  Cardiac: Regular rate and rhythm now. No MRG             Follow up in 1 week for fourth group visit       "

## 2025-04-19 NOTE — ASSESSMENT & PLAN NOTE
Continue same Losartan. Good bp control.   Continue group visits  Orders:  •  Ambulatory referral to chronic care management; Future

## 2025-04-19 NOTE — ASSESSMENT & PLAN NOTE
Clinically stable, continue same medical management with Atorvastatin    Orders:  •  Ambulatory referral to chronic care management; Future

## 2025-04-19 NOTE — ASSESSMENT & PLAN NOTE
Clinically stable, continue same medical management with Metformin    Lab Results   Component Value Date    HGBA1C 7.3 (A) 01/22/2025       Orders:  •  Ambulatory referral to chronic care management; Future

## 2025-04-21 ENCOUNTER — PATIENT OUTREACH (OUTPATIENT)
Age: 65
End: 2025-04-21

## 2025-04-21 VITALS
WEIGHT: 232.1 LBS | SYSTOLIC BLOOD PRESSURE: 106 MMHG | TEMPERATURE: 96.5 F | DIASTOLIC BLOOD PRESSURE: 54 MMHG | HEIGHT: 67 IN | OXYGEN SATURATION: 95 % | BODY MASS INDEX: 36.43 KG/M2 | HEART RATE: 43 BPM | RESPIRATION RATE: 15 BRPM

## 2025-04-21 DIAGNOSIS — E11.9 TYPE 2 DIABETES MELLITUS WITHOUT COMPLICATION, WITHOUT LONG-TERM CURRENT USE OF INSULIN (HCC): ICD-10-CM

## 2025-04-21 DIAGNOSIS — I10 PRIMARY HYPERTENSION: Primary | ICD-10-CM

## 2025-04-21 PROCEDURE — G0511 CCM/BHI BY RHC/FQHC 20MIN MO: HCPCS | Performed by: FAMILY MEDICINE

## 2025-04-21 NOTE — PROGRESS NOTES
Chronic Care Management Program Consent:    Patient informed of availability of Chronic Care Management services. The services will billed monthly by their Primary Care Provider only. Patient is informed there may be a monthly cost sharing associated with the Chronic Care Management services. Patient is aware that financial counseling is available to assist with any co-pay questions or concerns.    Chronic Care Management services include:    24/7 access to care.  Comprehensive plan of care created by the provider.  Individualized care planning by the care manager(s).  Transitional care support.    The patient is informed that they have the right to stop Chronic Care Management services at anytime.       Patient consents to Chronic Care Management services?     Patient informed that consent is needed only once unless the patient switches qualifying providers.      The patient returned my call. I introduced my role and explained the CCM program which he accepts.    The patient notes he has not been checking his blood sugars.  I encouraged him to start and we can review his readings on my next outreach.  The patient confirmed he is taking metformin bid.  I reviewed foods high in carbs (examples provided) and asked that he eat small portions and to increase protein/veggies.  The patient stated he cut down drinking beer and eating sweets.      The patient is UTD on his annual eye exam.  I educated the patient on the importance of checking his feet daily for any skin breakdown.  The patient noted he will start exercising by walking which I encouraged.  He reported he lost weight, from 244 to 232, which I congratulated him on.      The patient stated he cut down on smoking cigars (his last was 3 weeks ago) and noticed he does not have a cough any longer.  He is currently not using an MDI for his asthma.    The patient does not check his blood pressure at home but may purchase a kit.  He states he has been attending a  weekly HTN group which he has found very beneficial.  His last class is this Friday.      The patient is happy about making small positive changes for his health.  He notes he recently had an EGD/colonoscopy and will need a repeat in 3 years due to having 8 polyps.     The patient will call with any questions or concerns.  I will follow up next month.

## 2025-04-21 NOTE — PROGRESS NOTES
Progress Notes  Emerson Ken MD (Physician)  Family Medicine  Expand All Collapse All  Assessment & Plan  Primary hypertension  Continue same Losartan. Good bp control.   Continue group visits  Orders:    Ambulatory referral to chronic care management; Future     Type 2 diabetes mellitus without complication, without long-term current use of insulin (HCC)  Clinically stable, continue same medical management with Metformin           Lab Results   Component Value Date     HGBA1C 7.3 (A) 01/22/2025         Orders:    Ambulatory referral to chronic care management; Future     Mixed hyperlipidemia  Clinically stable, continue same medical management with Atorvastatin     Orders:    Ambulatory referral to chronic care management; Future     Type 2 diabetes mellitus with other circulatory complication, without long-term current use of insulin (HCC)              Continue current HTN meds      Spencer Vicente  presents today for shared medical visit for HTN, session # 3  We discussed the following pillars of Lifestyle medicine:  Session 1: Fiber  Session 2: Behavioral change  Session 3: Positive habits  Session 4: Grocery shopping for health  The above noted lifestyle goals were discussed in detail with practicum to enhance learning.     Progress/goals reached since last visit Continue wt loss via healthy lifestyle. He is down to 232.1. We discussed proper pacing of wt loss, with realistic goal of 5 - 10% loss over 6 months.   SMART Goals set this visit: continue to lose wt, rate of 5% per 6 mos. Implement increased fiber foods        Previous relevant clinical information reviewed from medical, surgical and psychosocial history, medication and allergies and labs/studies.     Problem List       Patient Active Problem List   Diagnosis    History of colon polyps    Mild intermittent asthma without complication    MOUNIKA (obstructive sleep apnea)    SOBOE (shortness of breath on exertion)    CAD (coronary artery disease)     "Mixed hyperlipidemia    Type 2 diabetes mellitus with circulatory disorder, without long-term current use of insulin (HCC)    Obesity, morbid (HCC)    Primary hypertension    Status post placement of implantable loop recorder    JUAN (generalized anxiety disorder)    Hoarding disorder    Gastroesophageal reflux disease    Hepatic steatosis               Review of systems: No new or worsening:   Unexplained fever/weight loss  Respiratory issues such as new shortness of breath, cough  Heart issues such as new chest pain or pressure, palpitations           106/54 L arm large cuff, sitting, P 43, RR 15, 96.5 tympanic L ear, pulse ox 95%, Ht 5'7\", Wt 232.1, shoes off           Alert, no acute distress, cooperative  Skin: no pallor  Respiration unlabored, LUNGS CTA  Cardiac: Regular rate and rhythm now. No MRG                 Follow up in 1 week for fourth group visit        "

## 2025-04-21 NOTE — PROGRESS NOTES
Patient identified for CCM billing.    I called the patient but his voicemail is not set up.  I will continue further outreach.    Chart reviewed.

## 2025-04-25 ENCOUNTER — OFFICE VISIT (OUTPATIENT)
Age: 65
End: 2025-04-25

## 2025-04-25 VITALS
WEIGHT: 232.4 LBS | DIASTOLIC BLOOD PRESSURE: 68 MMHG | BODY MASS INDEX: 36.4 KG/M2 | SYSTOLIC BLOOD PRESSURE: 133 MMHG | HEART RATE: 65 BPM

## 2025-04-25 DIAGNOSIS — I10 PRIMARY HYPERTENSION: Primary | ICD-10-CM

## 2025-04-25 DIAGNOSIS — E11.59 TYPE 2 DIABETES MELLITUS WITH OTHER CIRCULATORY COMPLICATION, WITHOUT LONG-TERM CURRENT USE OF INSULIN (HCC): ICD-10-CM

## 2025-04-25 PROCEDURE — 99213 OFFICE O/P EST LOW 20 MIN: CPT | Performed by: FAMILY MEDICINE

## 2025-04-25 PROCEDURE — G2211 COMPLEX E/M VISIT ADD ON: HCPCS | Performed by: FAMILY MEDICINE

## 2025-04-25 NOTE — PROGRESS NOTES
Assessment & Plan  Primary hypertension  BP today 133/68 mm hg  BP is currently Well Controlled  Patient endorses adherence to therapy, lifestyle modifications including DASH diet, low-sodium intake, alcohol intake,  incorporating more fiber  Will  continue home medications   Maintain a regular log of Blood pressures at home, bring the log to your next appointment  Continue losartan 25 mg PO OD    Type 2 diabetes mellitus with other circulatory complication, without long-term current use of insulin ([facility])    Lab Results   Component Value Date    HGBA1C 7.3 (A) 01/22/2025     - Patients last HBA1C was 7.3  - Will repeat HbA1C in 4/2025  - Will continue with current medications:metformin 1000mg PO BID  - Patient denies any episodes of hypoglycemia  - Patient educated on the importance of proper dieting and exercise               Spencer Vicente  presents today for shared medical visit for HTN, session # 4  We discussed the following pillars of Lifestyle medicine:  Session 1: Fiber  Session 2: Behavioral change  Session 3: Positive habits  Session 4: Grocery shopping for health  The above noted lifestyle goals were discussed in detail with practicum to enhance learning.    Progress/goals reached since last visit making more salads.  He likes to eat amber lettuce.  SMART Goals set this visit: Purchasing, cooking better foods with exercise. Eating a good salad everyday rich with more lettuce and spinach. Trying to stay away from Dairy.      Previous relevant clinical information reviewed from medical, surgical and psychosocial history, medication and allergies and labs/studies.    Patient Active Problem List   Diagnosis    History of colon polyps    Mild intermittent asthma without complication    MOUNIKA (obstructive sleep apnea)    SOBOE (shortness of breath on exertion)    CAD (coronary artery disease)    Mixed hyperlipidemia    Type 2 diabetes mellitus with circulatory disorder, without long-term current use of insulin  "(HCC)    Obesity, morbid (HCC)    Primary hypertension    Status post placement of implantable loop recorder    JUAN (generalized anxiety disorder)    Hoarding disorder    Gastroesophageal reflux disease    Hepatic steatosis         Review of systems: No new or worsening:   Unexplained fever/weight loss  Respiratory issues such as new shortness of breath, cough  Heart issues such as new chest pain or pressure, palpitations        /68 (BP Location: Left arm, Patient Position: Sitting, Cuff Size: Standard)   Pulse 65   Wt 105 kg (232 lb 6.4 oz)   BMI 36.40 kg/m²   Waist circumference (measured standing at umbilicus, patient exhales) - 48 cm      Alert, no acute distress, cooperative  Skin: no pallor  Respiration unlabored  Cardiac: Regular rate         Follow up in 1 week       Portions of the record may have been created with voice recognition software. Occasional wrong word or \"sound a like\" substitutions may have occurred due to the inherent limitations of voice recognition software. Read the chart carefully and recognize, using context, where substitutions have occurred.If you have any questions, please contact the dictating provider.       Kelly Santizo MD  PGY2 Family Medicine Resident  [facility]          "

## 2025-04-26 NOTE — ASSESSMENT & PLAN NOTE
Lab Results   Component Value Date    HGBA1C 7.3 (A) 01/22/2025     - Patients last HBA1C was 7.3  - Will repeat HbA1C in 4/2025  - Will continue with current medications:metformin 1000mg PO BID  - Patient denies any episodes of hypoglycemia  - Patient educated on the importance of proper dieting and exercise

## 2025-04-26 NOTE — ASSESSMENT & PLAN NOTE
BP today 133/68 mm hg  BP is currently Well Controlled  Patient endorses adherence to therapy, lifestyle modifications including DASH diet, low-sodium intake, alcohol intake,  incorporating more fiber  Will  continue home medications   Maintain a regular log of Blood pressures at home, bring the log to your next appointment  Continue losartan 25 mg PO OD

## 2025-05-12 ENCOUNTER — OFFICE VISIT (OUTPATIENT)
Dept: PSYCHIATRY | Facility: CLINIC | Age: 65
End: 2025-05-12
Payer: COMMERCIAL

## 2025-05-12 DIAGNOSIS — F41.1 GAD (GENERALIZED ANXIETY DISORDER): Primary | ICD-10-CM

## 2025-05-12 DIAGNOSIS — F42.3 HOARDING DISORDER: ICD-10-CM

## 2025-05-12 PROCEDURE — G2211 COMPLEX E/M VISIT ADD ON: HCPCS | Performed by: PHYSICIAN ASSISTANT

## 2025-05-12 PROCEDURE — 99214 OFFICE O/P EST MOD 30 MIN: CPT | Performed by: PHYSICIAN ASSISTANT

## 2025-05-12 NOTE — PSYCH
MEDICATION MANAGEMENT NOTE    Name: Spencer Vicente      : 1960      MRN: [id number]  Encounter Provider: Silke Saldivar  Encounter Date: 2025   Encounter department: [facility]    Insurance: Payor: [employer]  REP / Plan: HUMAN MEDICARE ADVANTAGE  REP / Product Type: Medicare PPO /      Reason for Visit:   Chief Complaint   Patient presents with    Follow-up    Medication Management    Anxiety   :  Assessment & Plan  JUAN (generalized anxiety disorder)  Stable - continue sertraline 50 mg qd; continue with outside therapists; f/u in 2-3 months         Hoarding disorder  Stable - continue sertraline 50 mg qd; continue with outside therapists; f/u in 2-3 months           He is noticing some abnormal movement in his jaw but this is unlikely to be TD as it is not associated with sertraline and is also not rhythmic or controllable to any extent. He decreased himself back to sertraline 50 mg qd and is doing well on this so he can remain on this dose unchanged. He is advised to be evaluated by neurology if the abnormal jaw movements continue. He is court mandated to see a therapist and a psychiatrist (court mandated therapist is GREGORIO Conti). He will continue to work with Spencer Waters, PhD, LPC, for therapy as well.     Treatment Recommendations:    Continue current medication:    - sertraline 50 mg qd    Educated about diagnosis and treatment modalities. Verbalizes understanding and agreement with the treatment plan.  Discussed self monitoring of symptoms, and symptom monitoring tools.  Discussed medications and if treatment adjustment was needed or desired.  Medication management every 2 months  Aware of 24 hour and weekend coverage for urgent situations accessed by calling [facility] main practice number  Continue psychotherapy with own therapist  I am scheduling this patient out for greater than 3 months: No    Medications Risks/Benefits:  "     Risks, Benefits And Possible Side Effects Of Medications:    Risks, benefits, and possible side effects of medications explained to Spencer and he (or legal representative) verbalizes understanding and agreement for treatment.    Controlled Medication Discussion:     Not applicable      History of Present Illness     Spencer is seen today for a follow up for Follow-up, Medication Management, and Anxiety. Since his last visit he reports that his mood and anxiety are under good control. His intrusive thoughts are also under good control. He does report that he had a hard time yesterday as it was his first mother's day without his mom, but he did okay. He notes that since the last visit he has started having odd \"biting\" jaw movements around 6 times per day with seemingly no rhyme or reason. He reduced sertraline to 50 mg qd on his own and he thinks he maybe abated slightly. He has not seen a neurologist and his PCP mentioned it might be beneficial.     Past medication trials:  Trintellix (no benefit)    He denies any suicidal ideation, intent or plan at present; denies any homicidal ideation, intent or plan at present.    He denies any auditory hallucinations, denies any visual hallucinations, denies any delusions.    He denies any side effects from current psychiatric medications.    HPI ROS Appetite Changes and Sleep:     He reports normal sleep, normal appetite, normal energy level    Review Of Systems: A review of systems is obtained and is negative except for the pertinent positives listed in HPI/Subjective above.      Current Rating Scores:     None completed today.    Areas of Improvement: reviewed in HPI/Subjective Section and reviewed in Assessment and Plan Section      Past Medical History:   Diagnosis Date    Aphasia     2017- post stent placement    BPH (benign prostatic hyperplasia)     CAD (coronary artery disease)     Cellulitis of left lower extremity 03/16/2021    CKD (chronic kidney disease) " 03/16/2021    Claustrophobia     Colon polyp     Depression     Diabetes mellitus (HCC)     Expressive aphasia     only after anesthesia lasts about one hour    Hyperlipidemia     Hypertension     Migraine     Hx none since 2014    Orthostatic hypotension     Shortness of breath     exertional SOB    Sleep apnea     [place] not wear mask ,ill fitting and noise made it impossible to wear     Past Surgical History:   Procedure Laterality Date    CARDIAC LOOP RECORDER      CATARACT EXTRACTION      COLONOSCOPY      CORONARY STENT PLACEMENT N/A mar and oct 2017    x2    DC XCAPSL CTRC RMVL INSJ IO LENS PROSTH W/O ECP Left 08/31/2020    Procedure: EXTRACTION EXTRACAPSULAR CATARACT PHACO INTRAOCULAR LENS (IOL);  Surgeon: Santiago Rolle MD;  Location: [facility] MAIN OR;  Service: Ophthalmology    DC XCAPSL CTRC RMVL INSJ IO LENS PROSTH W/O ECP Right 03/14/2022    Procedure: EXTRACTION EXTRACAPSULAR CATARACT PHACO INTRAOCULAR LENS (IOL);  Surgeon: Santiago Rolle MD;  Location: [facility] MAIN OR;  Service: Ophthalmology     Allergies:   Allergies   Allergen Reactions    Zinc Acetate Nausea Only       Current Outpatient Medications   Medication Instructions    aspirin 81 mg, Daily at bedtime    atorvastatin (LIPITOR) 40 mg, Daily at bedtime    Blood Glucose Monitoring Suppl (OneTouch Verio Reflect) w/Device KIT Check blood sugars once daily. Please substitute with appropriate alternative as covered by patient's insurance. Dx: E11.65    famotidine (PEPCID) 20 mg, Oral, 2 times daily    glucose blood (OneTouch Verio) test strip Check blood sugars once daily. Please substitute with appropriate alternative as covered by patient's insurance. Dx: E11.65    isosorbide mononitrate (IMDUR) 30 mg, Daily    losartan (COZAAR) 25 mg, Daily at bedtime    metFORMIN (GLUCOPHAGE-XR) 1,000 mg, Oral, 2 times daily with meals    nitroglycerin (NITROSTAT) 0.4 mg, Every 5 minutes PRN    OneTouch Delica Lancets 33G MISC Check blood sugars once daily. Please  substitute with appropriate alternative as covered by patient's insurance. Dx: E11.65    psyllium (METAMUCIL) 58.6 % packet 1 packet, Daily    sertraline (ZOLOFT) 75 mg, Oral, Daily    sotalol (BETAPACE) 80 mg, 2 times daily        Substance Abuse History:    Tobacco, Alcohol and Drug Use History     Tobacco Use    Smoking status: Some Days     Types: Cigars     Start date: 8/21/1992    Smokeless tobacco: Never    Tobacco comments:     2 a day  was 6 a day for 6 months only   Vaping Use    Vaping status: Never Used   Substance Use Topics    Alcohol use: Not Currently     Alcohol/week: 2.0 standard drinks of alcohol     Types: 2 Cans of beer per week    Drug use: No          Social History:    Social History     Socioeconomic History    Marital status: Single     Spouse name: Not on file    Number of children: Not on file    Years of education: Not on file    Highest education level: Not on file   Occupational History    Not on file   Other Topics Concern    Not on file   Social History Narrative    Not on file        Family Psychiatric History:     Family History   Problem Relation Age of Onset    Colon cancer Maternal Grandmother     Breast cancer Mother     Other Mother     Heart Valve Disease Father     Leukemia Father     Migraines Sister     Heart failure Family        Medical History Reviewed by provider this encounter:  Tobacco  Allergies  Meds  Problems  Med Hx  Surg Hx  Fam Hx          Objective   There were no vitals taken for this visit.     Mental Status Evaluation:    Appearance age appropriate, casually dressed, looks stated age   Behavior cooperative, calm   Speech normal rate, normal volume, normal pitch, spontaneous   Mood improved   Affect slightly brighter   Thought Processes organized, goal directed   Thought Content no overt delusions   Perceptual Disturbances: no auditory hallucinations, no visual hallucinations   Abnormal Thoughts  Risk Potential Suicidal ideation - None  Homicidal  ideation - None  Potential for aggression - No   Orientation oriented to person, place, time/date, and situation   Memory recent and remote memory grossly intact   Consciousness alert and awake   Attention Span Concentration Span attention span and concentration are age appropriate   Intellect appears to be of average intelligence   Insight intact   Judgement intact   Muscle Strength and  Gait normal muscle strength and normal muscle tone, normal gait and normal balance   Motor activity no abnormal movements   Language no difficulty naming common objects, no difficulty repeating a phrase, no difficulty writing a sentence   Fund of Knowledge adequate knowledge of current events  adequate fund of knowledge regarding past history  adequate fund of knowledge regarding vocabulary        Laboratory Results: I have personally reviewed all pertinent laboratory/tests results    Suicide/Homicide Risk Assessment:    Risk of Harm to Self:  The following ratings are based on assessment at the time of the interview  Based on today's assessment, Spencer presents the following risk of harm to self: none    Risk of Harm to Others:  The following ratings are based on assessment at the time of the interview  Based on today's assessment, Spencer presents the following risk of harm to others: none    The following interventions are recommended: Continue medication management. No other intervention changes indicated at this time.    Psychotherapy Provided:     Individual psychotherapy provided: No    Treatment Plan:    Completed and signed during the session: Not applicable - Treatment Plan not due at this session.    Goals: Progress towards Treatment Plan goals - Yes, progressing, as evidenced by subjective findings in HPI/Subjective Section and in Assessment and Plan Section    Depression Follow-up Plan Completed: Not applicable    Note Share:    This note was shared with patient.    Visit Time  Visit Start Time: 3:00 PM  Visit Stop Time:  "3:20 PM  Total Visit Duration:  20 minutes    Portions of the record may have been created with voice recognition software. Occasional wrong word or \"sound a like\" substitutions may have occurred due to the inherent limitations of voice recognition software. Read the chart carefully and recognize, using context, where substitutions have occurred.    Silke Saldivar 05/12/25  "

## 2025-05-19 ENCOUNTER — PATIENT OUTREACH (OUTPATIENT)
Age: 65
End: 2025-05-19

## 2025-05-19 DIAGNOSIS — I10 PRIMARY HYPERTENSION: Primary | ICD-10-CM

## 2025-05-19 DIAGNOSIS — E11.59 TYPE 2 DIABETES MELLITUS WITH OTHER CIRCULATORY COMPLICATION, WITHOUT LONG-TERM CURRENT USE OF INSULIN (HCC): ICD-10-CM

## 2025-05-19 NOTE — PROGRESS NOTES
"I called the patient to follow up.  He states he has been feeling well.  He was happy to have attended the HTN classes every Friday in April.  He notes he has been cooking, and eating more salad and fruit which I commended him on.  The patient reports his weight is down to 229 which I congratulated him on.  He notes his anxiety is preventing him from going out to exercise; I provided supportive encouragement.      The patient states he has not been checking his blood sugar or blood pressure but is aware of the need to.      The patient reported he has \"involuntary tics\".  He notes he intermittently is biting and believed it could be due to his Zoloft. He reduced the strength from 75mg to 50mg and notes the tic has slightly reduced.      The patient states he recently got a cat.  He finds having one is helping his mental health and I agreed animals are very therapeutic.      The patient states he will be attending another HTN class 5/23. He notes they will be held monthly.    The patient will call with any questions or concerns.  I will follow up next month.      "

## 2025-05-23 ENCOUNTER — OFFICE VISIT (OUTPATIENT)
Age: 65
End: 2025-05-23

## 2025-05-23 DIAGNOSIS — E63.8 IMBALANCED NUTRITION: Primary | ICD-10-CM

## 2025-05-23 NOTE — PROGRESS NOTES
"  Assessment & Plan  Imbalanced nutrition           Spencer Vicente  presents today for shared medical visit for prevention and treatment of chronic diseases and issues listed.    We discussed lifestyle principles and practice, focusing on car  today which were discussed in detail with practicum to enhance learning.over a 2 hour period.    Since Spencer's first visit he has lost 18 lbs (in 2 months) and lost 4 inches in waist circumference (52-48 cm). Spencer feels energetic and over all love the change he is feeling in his body.     SMART Goals set this visit:     \"I am going to walk 1 block (about 15 minutes) 1 day a week over the next 4 weeks.\"     Relevant clinical information was reviewed from medical, surgical and psychosocial history, medication and allergies and labs/studies.    Problem List[1]    Review of systems: No new or worsening:   Unexplained fever/weight loss  Respiratory issues such as new shortness of breath, cough  Heart issues such as new chest pain or pressure, palpitations    There were no vitals taken for this visit.    Alert, no acute distress, cooperative  Skin: no pallor  Respiration unlabored  Cardiac: Regular rate         Patient expressed understanding and agreement with medical plan and related questions were answered.  Follow up in 1 month for next shared medical visit         [1]   Patient Active Problem List  Diagnosis    History of colon polyps    Mild intermittent asthma without complication    MOUNIKA (obstructive sleep apnea)    SOBOE (shortness of breath on exertion)    CAD (coronary artery disease)    Mixed hyperlipidemia    Type 2 diabetes mellitus with circulatory disorder, without long-term current use of insulin (HCC)    Obesity, morbid (HCC)    Primary hypertension    Status post placement of implantable loop recorder    JUAN (generalized anxiety disorder)    Hoarding disorder    Gastroesophageal reflux disease    Hepatic steatosis     "

## 2025-06-19 ENCOUNTER — TELEPHONE (OUTPATIENT)
Age: 65
End: 2025-06-19

## 2025-06-19 ENCOUNTER — PATIENT OUTREACH (OUTPATIENT)
Age: 65
End: 2025-06-19

## 2025-06-19 DIAGNOSIS — E11.59 TYPE 2 DIABETES MELLITUS WITH OTHER CIRCULATORY COMPLICATION, WITHOUT LONG-TERM CURRENT USE OF INSULIN (HCC): ICD-10-CM

## 2025-06-19 DIAGNOSIS — I10 PRIMARY HYPERTENSION: Primary | ICD-10-CM

## 2025-06-19 NOTE — PROGRESS NOTES
I called the patient to follow up.  He states he has been feeling well.  He has not been checking his blood sugars but is motivated to start.  He notes he continues taking metformin bid and watching his diet carefully.  I educated him on the importance of checking his feet daily for skin breakdown.      The patient reports a weight of 229.  The patient states he has been quite active which I encouraged him to continue.  He notes his asthma has been stable.    The patient is in need of a PCP follow up; note sent to clerical.  He also will be attending the monthly HTN class soon.

## 2025-06-19 NOTE — TELEPHONE ENCOUNTER
Attempted to call patient several times.  Voicemail is not set up so was unable to leave any messages.  Will keep trying

## 2025-06-20 ENCOUNTER — TELEPHONE (OUTPATIENT)
Age: 65
End: 2025-06-20

## 2025-06-27 ENCOUNTER — OFFICE VISIT (OUTPATIENT)
Age: 65
End: 2025-06-27

## 2025-06-27 VITALS
TEMPERATURE: 95.4 F | BODY MASS INDEX: 35.55 KG/M2 | DIASTOLIC BLOOD PRESSURE: 78 MMHG | HEART RATE: 90 BPM | WEIGHT: 227 LBS | SYSTOLIC BLOOD PRESSURE: 128 MMHG

## 2025-06-27 DIAGNOSIS — E63.8 IMBALANCED NUTRITION: ICD-10-CM

## 2025-06-27 DIAGNOSIS — I10 PRIMARY HYPERTENSION: Primary | ICD-10-CM

## 2025-06-27 DIAGNOSIS — E11.59 TYPE 2 DIABETES MELLITUS WITH OTHER CIRCULATORY COMPLICATION, WITHOUT LONG-TERM CURRENT USE OF INSULIN (HCC): Primary | ICD-10-CM

## 2025-06-27 PROCEDURE — G2211 COMPLEX E/M VISIT ADD ON: HCPCS | Performed by: FAMILY MEDICINE

## 2025-06-27 PROCEDURE — 99213 OFFICE O/P EST LOW 20 MIN: CPT | Performed by: FAMILY MEDICINE

## 2025-06-27 NOTE — ASSESSMENT & PLAN NOTE
BP well controlled   Home regimen - Losartan 25 mg HS, Imdur 30 mg QD and Sotalol 80 mg BID.   Patient notes symptoms of dizziness and light headedness  Weight loss of 20+ lbs since starting lifestyle medicine group visits and making lifestyle changes     Discontinue Imdur   Continue Sotalol and Losartan daily   Ordered BP kit to check BP at home daily   RTO in 2 weeks with BP logs   Continue lifestyle changes   Orders:  •  Blood Pressure Monitoring KIT; Use daily in the early morning

## 2025-06-27 NOTE — PROGRESS NOTES
Assessment & Plan  Primary hypertension  BP well controlled   Home regimen - Losartan 25 mg HS, Imdur 30 mg QD and Sotalol 80 mg BID.   Patient notes symptoms of dizziness and light headedness  Weight loss of 20+ lbs since starting lifestyle medicine group visits and making lifestyle changes     Discontinue Imdur   Continue Sotalol and Losartan daily   Ordered BP kit to check BP at home daily   RTO in 2 weeks with BP logs   Continue lifestyle changes   Orders:  •  Blood Pressure Monitoring KIT; Use daily in the early morning    Imbalanced nutrition            Spencer Macedoadore  presents today for shared medical visit for prevention and treatment of chronic diseases and issues listed.    We discussed lifestyle principles and practice, focusing on  fiber  today which were discussed in detail with practicum to enhance learning.over a 2 hour period.    Progress/goals reached since last visit weight loss and improvement in BP    SMART Goals set this visit:   I will be more conscious to look at the labels of products that I purchase.  I will incorporate more fiber rich foods in my diet.     Relevant clinical information was reviewed from medical, surgical and psychosocial history, medication and allergies and labs/studies.    Problem List[1]      Review of systems: No new or worsening:   Unexplained fever/weight loss  Respiratory issues such as new shortness of breath, cough  Heart issues such as new chest pain or pressure, palpitations    /78 (BP Location: Left arm, Patient Position: Sitting, Cuff Size: Standard)   Pulse 90   Temp (!) 95.4 °F (35.2 °C)   Wt 103 kg (227 lb)   BMI 35.55 kg/m²     Alert, no acute distress, cooperative  Skin: no pallor  Respiration unlabored  Cardiac: Regular rate         Patient expressed understanding and agreement with medical plan and related questions were answered.  Follow up in 1 month for next shared medical visit                [1]  Patient Active Problem List  Diagnosis   •  History of colon polyps   • Mild intermittent asthma without complication   • MOUNIKA (obstructive sleep apnea)   • SOBOE (shortness of breath on exertion)   • CAD (coronary artery disease)   • Mixed hyperlipidemia   • Type 2 diabetes mellitus with circulatory disorder, without long-term current use of insulin (HCC)   • Obesity, morbid (HCC)   • Primary hypertension   • Status post placement of implantable loop recorder   • JUAN (generalized anxiety disorder)   • Hoarding disorder   • Gastroesophageal reflux disease   • Hepatic steatosis

## 2025-06-30 DIAGNOSIS — E11.9 TYPE 2 DIABETES MELLITUS WITHOUT COMPLICATION, WITHOUT LONG-TERM CURRENT USE OF INSULIN (HCC): ICD-10-CM

## 2025-06-30 RX ORDER — METFORMIN HYDROCHLORIDE 500 MG/1
1000 TABLET, EXTENDED RELEASE ORAL 2 TIMES DAILY WITH MEALS
Qty: 400 TABLET | Refills: 0 | Status: SHIPPED | OUTPATIENT
Start: 2025-06-30

## 2025-07-18 ENCOUNTER — PATIENT OUTREACH (OUTPATIENT)
Age: 65
End: 2025-07-18

## 2025-07-18 DIAGNOSIS — I10 PRIMARY HYPERTENSION: ICD-10-CM

## 2025-07-18 DIAGNOSIS — E11.59 TYPE 2 DIABETES MELLITUS WITH OTHER CIRCULATORY COMPLICATION, WITHOUT LONG-TERM CURRENT USE OF INSULIN (HCC): Primary | ICD-10-CM

## 2025-07-18 PROCEDURE — G0511 CCM/BHI BY RHC/FQHC 20MIN MO: HCPCS | Performed by: FAMILY MEDICINE

## 2025-07-18 NOTE — PROGRESS NOTES
I called the patient to follow up.  He stated he could not talk because he was busy.  He gave me a brief update.  The patient stated he is now routinely walking with a friend which I commended him on and asked him to continue.  He noted he has a BP kit and will start checking his blood pressure.  I congratulated him on having one of his HTN meds discontinued.  He notes he continues checking his feet daily for skin breakdown.  He stated he will try to start checking blood sugars.    I will continue to follow.

## 2025-07-23 DIAGNOSIS — F41.1 GAD (GENERALIZED ANXIETY DISORDER): ICD-10-CM

## 2025-08-07 ENCOUNTER — OFFICE VISIT (OUTPATIENT)
Dept: PSYCHIATRY | Facility: CLINIC | Age: 65
End: 2025-08-07
Payer: COMMERCIAL

## 2025-08-07 DIAGNOSIS — F42.3 HOARDING DISORDER: ICD-10-CM

## 2025-08-07 DIAGNOSIS — F41.1 GAD (GENERALIZED ANXIETY DISORDER): Primary | ICD-10-CM

## 2025-08-07 PROCEDURE — 99214 OFFICE O/P EST MOD 30 MIN: CPT | Performed by: PHYSICIAN ASSISTANT

## 2025-08-07 PROCEDURE — G2211 COMPLEX E/M VISIT ADD ON: HCPCS | Performed by: PHYSICIAN ASSISTANT

## 2025-08-07 RX ORDER — ISOSORBIDE MONONITRATE 30 MG/1
TABLET, EXTENDED RELEASE ORAL
COMMUNITY
Start: 2025-07-23

## 2025-08-15 ENCOUNTER — PATIENT OUTREACH (OUTPATIENT)
Age: 65
End: 2025-08-15

## 2025-08-20 ENCOUNTER — PATIENT OUTREACH (OUTPATIENT)
Age: 65
End: 2025-08-20

## 2025-08-20 DIAGNOSIS — E11.59 TYPE 2 DIABETES MELLITUS WITH OTHER CIRCULATORY COMPLICATION, WITHOUT LONG-TERM CURRENT USE OF INSULIN (HCC): ICD-10-CM

## 2025-08-20 DIAGNOSIS — I10 PRIMARY HYPERTENSION: Primary | ICD-10-CM

## (undated) DEVICE — B-H IRRIGATING CAN 19GA FLAT ANGLED 8MM: Brand: OPHTHALMIC CANNULA

## (undated) DEVICE — ACTIVE FMS W/ INTREPID* ULTRA SLEEVES, 0.9MM 45° ABS* INTREPID* BALANCED TIP: Brand: ALCON

## (undated) DEVICE — AIR INJECT CANNULA 27GA: Brand: OPHTHALMIC CANNULA

## (undated) DEVICE — GLOVE SRG BIOGEL 7.5

## (undated) DEVICE — INTREPID® TRANSFORMER IA HP: Brand: INTREPID®

## (undated) DEVICE — EYE PACK CUSTOM -FINNEGAN

## (undated) DEVICE — CLEARCUT® SLIT KNIFE INTREPID MICRO-COAXIAL SYSTEM 2.4 SB: Brand: CLEARCUT®; INTREPID

## (undated) DEVICE — MICROSURGICAL INSTRUMENT IRR. CYSTITOME 25GA STRAIGHT-REVERSE CUTTING: Brand: ALCON

## (undated) DEVICE — THE MONARCH® "D" CARTRIDGE IS A SINGLE-USE POLYPROPYLENE CARTRIDGE FOR POSTERIOR CHAMBER IOL DELIVERY: Brand: MONARCH® III

## (undated) DEVICE — ACRYSOF(R) IQ ASPHERIC NATURAL IOL, SINGLE-PIECE ACRYLIC FOLDABLE PCL, UV WITH BLUE LIGHTFILTER, 13.0MM LENGTH, 6.0MM ANTERIORASYMMETRIC BICONVEX OPTIC, PLANAR HAPTICS.
Type: IMPLANTABLE DEVICE | Site: EYE | Status: NON-FUNCTIONAL
Brand: ACRYSOF®